# Patient Record
Sex: FEMALE | Race: WHITE | NOT HISPANIC OR LATINO | Employment: OTHER | URBAN - NONMETROPOLITAN AREA
[De-identification: names, ages, dates, MRNs, and addresses within clinical notes are randomized per-mention and may not be internally consistent; named-entity substitution may affect disease eponyms.]

---

## 2021-04-27 DIAGNOSIS — I10 ESSENTIAL HYPERTENSION, BENIGN: Primary | ICD-10-CM

## 2021-04-27 DIAGNOSIS — E78.2 MIXED HYPERLIPIDEMIA: ICD-10-CM

## 2021-04-27 RX ORDER — ROSUVASTATIN CALCIUM 20 MG/1
TABLET, COATED ORAL
Qty: 90 TABLET | Refills: 0 | Status: SHIPPED | OUTPATIENT
Start: 2021-04-27 | End: 2021-09-10 | Stop reason: SDUPTHER

## 2021-04-27 RX ORDER — METOPROLOL SUCCINATE 50 MG/1
TABLET, EXTENDED RELEASE ORAL
Qty: 90 TABLET | Refills: 0 | Status: SHIPPED | OUTPATIENT
Start: 2021-04-27 | End: 2021-09-10 | Stop reason: SDUPTHER

## 2021-09-10 ENCOUNTER — OFFICE VISIT (OUTPATIENT)
Dept: CARDIOLOGY | Facility: CLINIC | Age: 61
End: 2021-09-10

## 2021-09-10 VITALS
TEMPERATURE: 99 F | HEIGHT: 57 IN | DIASTOLIC BLOOD PRESSURE: 82 MMHG | OXYGEN SATURATION: 96 % | RESPIRATION RATE: 12 BRPM | HEART RATE: 86 BPM | WEIGHT: 135 LBS | BODY MASS INDEX: 29.12 KG/M2 | SYSTOLIC BLOOD PRESSURE: 124 MMHG

## 2021-09-10 DIAGNOSIS — I10 ESSENTIAL HYPERTENSION: Primary | ICD-10-CM

## 2021-09-10 DIAGNOSIS — E78.5 HYPERLIPIDEMIA LDL GOAL <100: ICD-10-CM

## 2021-09-10 DIAGNOSIS — J44.1 COPD WITH ACUTE EXACERBATION (HCC): ICD-10-CM

## 2021-09-10 DIAGNOSIS — Z72.0 TOBACCO USE: ICD-10-CM

## 2021-09-10 DIAGNOSIS — E78.2 MIXED HYPERLIPIDEMIA: ICD-10-CM

## 2021-09-10 DIAGNOSIS — I10 ESSENTIAL HYPERTENSION, BENIGN: ICD-10-CM

## 2021-09-10 PROCEDURE — 99203 OFFICE O/P NEW LOW 30 MIN: CPT | Performed by: INTERNAL MEDICINE

## 2021-09-10 PROCEDURE — 93000 ELECTROCARDIOGRAM COMPLETE: CPT | Performed by: INTERNAL MEDICINE

## 2021-09-10 RX ORDER — LISINOPRIL 20 MG/1
20 TABLET ORAL DAILY
Qty: 90 TABLET | Refills: 3 | Status: SHIPPED | OUTPATIENT
Start: 2021-09-10 | End: 2022-09-09 | Stop reason: SDUPTHER

## 2021-09-10 RX ORDER — LANOLIN ALCOHOL/MO/W.PET/CERES
1000 CREAM (GRAM) TOPICAL DAILY
COMMUNITY
End: 2023-03-09

## 2021-09-10 RX ORDER — ROSUVASTATIN CALCIUM 20 MG/1
20 TABLET, COATED ORAL DAILY
Qty: 90 TABLET | Refills: 3 | Status: SHIPPED | OUTPATIENT
Start: 2021-09-10 | End: 2022-08-12

## 2021-09-10 RX ORDER — METOPROLOL SUCCINATE 50 MG/1
50 TABLET, EXTENDED RELEASE ORAL DAILY
Qty: 90 TABLET | Refills: 3 | Status: SHIPPED | OUTPATIENT
Start: 2021-09-10 | End: 2022-08-12

## 2021-09-11 NOTE — PROGRESS NOTES
[facility] [place]  [facility] CARDIOLOGY  [address] [place] [address]  Dept: [phone]  Dept Fax: [phone]    Skye Barlow  1960    Follow Up Office Visit Note    History of Present Illness:  Skye Barlow is a 61 y.o. female who presents to the clinic for Follow-up. Hypertension- She denies any complaints, on Toprol xl 50 mg, and  Lisinopril 20 mg BP is 120.60.     The following portions of the patient's history were reviewed and updated as appropriate: allergies, current medications, past family history, past medical history, past social history, past surgical history and problem list.    Medications:  albuterol  albuterol sulfate HFA  diclofenac  levocetirizine  lisinopril  metoprolol succinate XL  pantoprazole  rosuvastatin  Spiriva Respimat aerosol solution  vitamin B-12  vitamin D3 capsule  Zinc capsule    Subjective  Allergies   Allergen Reactions   • Sulfa Antibiotics Hives   • Sulfamethoxazole Rash   • Trimethoprim Rash        Past Medical History:   Diagnosis Date   • Anxiety and depression    • Anxiety state    • Benign essential hypertension    • Chronic obstructive lung disease (CMS/[facility])    • COPD (chronic obstructive pulmonary disease) (CMS/[facility])    • Depressive disorder    • Gastroesophageal reflux disease    • Migraines    • Psoriasis    • TMJ (dislocation of temporomandibular joint)    • Tobacco abuse        Past Surgical History:   Procedure Laterality Date   •  SECTION      X3       Family History   Problem Relation Age of Onset   • Hypertension Mother    • Hypertension Father    • Dementia Father    • Colon cancer Maternal Grandmother    • Coronary artery disease Paternal Grandmother    • Diabetes Paternal Grandmother    • Hypertension Other    • Diabetes Other         Social History     Socioeconomic History   • Marital status:      Spouse name: Not on file   • Number of children: Not on file   • Years of education: Not on file   •  "Highest education level: Not on file   Tobacco Use   • Smoking status: Current Every Day Smoker     Packs/day: 0.25     Types: Cigarettes   • Smokeless tobacco: Never Used   Vaping Use   • Vaping Use: Never used   Substance and Sexual Activity   • Alcohol use: Never   • Drug use: Never   • Sexual activity: Defer       Review of Systems   Constitutional: Negative.    HENT: Negative.    Respiratory: Negative.    Cardiovascular: Negative.    Endocrine: Negative.    Genitourinary: Negative.    Musculoskeletal: Negative.    Skin: Negative.    Allergic/Immunologic: Negative.    Neurological: Negative.    Hematological: Negative.    Psychiatric/Behavioral: Negative.    All other systems reviewed and are negative.      Cardiovascular Procedures    ECHO/MUGA:   STRESS TESTS:   CARDIAC CATH:   DEVICES:   HOLTER:   CT/MRI:   VASCULAR:   CARDIOTHORACIC:     Objective  Vitals:    09/10/21 1508   BP: 124/82   BP Location: Right arm   Patient Position: Sitting   Cuff Size: Adult   Pulse: 86   Resp: 12   Temp: 99 °F (37.2 °C)   TempSrc: Infrared   SpO2: 96%   Weight: 61.2 kg (135 lb)   Height: 144.8 cm (57\")   PainSc: 0-No pain     Body mass index is 29.21 kg/m².     Physical Exam  Constitutional:       Appearance: Healthy appearance. Not in distress.   Neck:      Vascular: No JVR. JVD normal.   Pulmonary:      Effort: Pulmonary effort is normal.      Breath sounds: Normal breath sounds. No wheezing. No rhonchi. No rales.   Chest:      Chest wall: Not tender to palpatation.   Cardiovascular:      PMI at left midclavicular line. Normal rate. Regular rhythm. Normal S1. Normal S2.      Murmurs: There is no murmur.      No gallop. No click. No rub.   Pulses:     Intact distal pulses.   Edema:     Peripheral edema absent.   Abdominal:      General: Bowel sounds are normal.      Palpations: Abdomen is soft.      Tenderness: There is no abdominal tenderness.   Musculoskeletal: Normal range of motion.         General: No tenderness. " Skin:     General: Skin is warm and dry.   Neurological:      General: No focal deficit present.      Mental Status: Alert and oriented to person, place and time.          Diagnostic Data    ECG 12 Lead    Date/Time: 9/10/2021 9:19 PM  Performed by: Adnre Burt MD  Authorized by: Andre Burt MD   Comparison: compared with previous ECG   Similar to previous ECG  Rhythm: sinus rhythm  Rate: normal  BPM: 76  QRS axis: normal    Clinical impression: normal ECG            Assessment and Plan  Diagnoses and all orders for this visit:    Essential hypertension- The BP seems fine, will keep same meds    Hyperlipidemia LDL goal <100- On Crestor 20 mg    Tobacco use- Still smoking 6 cigarettes daily    COPD with acute exacerbation (CMS/HCC)  -        Other orders  -     Zinc 50 MG capsule; Take  by mouth.  -     vitamin B-12 (CYANOCOBALAMIN) 1000 MCG tablet; Take 1,000 mcg by mouth Daily.  -     lisinopril (PRINIVIL,ZESTRIL) 20 MG tablet; Take 1 tablet by mouth Daily.         Return in about 1 year (around 9/10/2022) for Recheck.    Andre Burt MD  09/10/2021

## 2022-04-08 ENCOUNTER — TELEPHONE (OUTPATIENT)
Dept: FAMILY MEDICINE CLINIC | Facility: CLINIC | Age: 62
End: 2022-04-08

## 2022-04-08 RX ORDER — ALBUTEROL SULFATE 90 UG/1
2 AEROSOL, METERED RESPIRATORY (INHALATION) EVERY 4 HOURS PRN
Qty: 8.5 G | Refills: 1 | Status: SHIPPED | OUTPATIENT
Start: 2022-04-08

## 2022-04-08 RX ORDER — TIOTROPIUM BROMIDE INHALATION SPRAY 3.12 UG/1
2 SPRAY, METERED RESPIRATORY (INHALATION)
Qty: 4 G | Refills: 1 | Status: SHIPPED | OUTPATIENT
Start: 2022-04-08 | End: 2022-08-12

## 2022-04-08 NOTE — TELEPHONE ENCOUNTER
Incoming Refill Request      Medication requested (name and dose): INHALER    Pharm[facility] where [facility] be sent[facility]    Additional details provided by patient: PT IS OUT    Best call back number: [phone]    Does the patient have less than a 3 day supply:  [x] Yes  [] No    Meli Leos Rep  04/08/22, 09:55 EDT

## 2022-04-26 ENCOUNTER — OFFICE VISIT (OUTPATIENT)
Dept: FAMILY MEDICINE CLINIC | Facility: CLINIC | Age: 62
End: 2022-04-26

## 2022-04-26 VITALS
OXYGEN SATURATION: 95 % | WEIGHT: 142 LBS | DIASTOLIC BLOOD PRESSURE: 84 MMHG | HEART RATE: 80 BPM | HEIGHT: 57 IN | TEMPERATURE: 97.8 F | BODY MASS INDEX: 30.63 KG/M2 | SYSTOLIC BLOOD PRESSURE: 158 MMHG

## 2022-04-26 DIAGNOSIS — J40 BRONCHITIS: Primary | ICD-10-CM

## 2022-04-26 PROCEDURE — 99213 OFFICE O/P EST LOW 20 MIN: CPT | Performed by: PHYSICIAN ASSISTANT

## 2022-04-26 RX ORDER — PREDNISONE 20 MG/1
TABLET ORAL
Qty: 10 TABLET | Refills: 0 | Status: SHIPPED | OUTPATIENT
Start: 2022-04-26 | End: 2022-05-25

## 2022-04-26 RX ORDER — DOXYCYCLINE HYCLATE 100 MG/1
100 CAPSULE ORAL 2 TIMES DAILY
Qty: 20 CAPSULE | Refills: 0 | Status: SHIPPED | OUTPATIENT
Start: 2022-04-26 | End: 2022-05-25

## 2022-04-27 NOTE — PROGRESS NOTES
"Chief Complaint  Shortness of Breath (Pt complains of SOB, cough and congeestion onset 1 week. )    Subjective          Skye Barlow presents to [facility]  Patient in today for evaluation on cough, congestion, and some shortness of breath and wheezing  for the past 7 to 10 days.  Denies any fever.  States has had some production of yellow thick phlegm.  History of COPD.  Denies any known sick contacts.  Denies any body aches.  States feels similar to her bronchitis symptoms in the past.  Denies any nausea or vomiting.    Shortness of Breath  This is a new problem. Associated symptoms include wheezing. Pertinent negatives include no abdominal pain, ear pain, fever, headaches, leg swelling, PND, sore throat or vomiting.       Objective   Vital Signs:   /84   Pulse 80   Temp 97.8 °F (36.6 °C) (Oral)   Ht 144.8 cm (57\")   Wt 64.4 kg (142 lb)   SpO2 95%   BMI 30.73 kg/m²     Body mass index is 30.73 kg/m².    Review of Systems   Constitutional: Negative for fever.   HENT: Positive for congestion. Negative for ear pain and sore throat.    Respiratory: Positive for cough, shortness of breath and wheezing.    Cardiovascular: Negative for palpitations, leg swelling and PND.   Gastrointestinal: Negative for abdominal pain, diarrhea, nausea and vomiting.   Neurological: Negative for dizziness and headache.       Past History:  Medical History: has a past medical history of Anxiety and depression, Anxiety state, Benign essential hypertension, Chronic obstructive lung disease (HCC), COPD (chronic obstructive pulmonary disease) (HCC), Depressive disorder, Gastroesophageal reflux disease, Migraines, Psoriasis, TMJ (dislocation of temporomandibular joint), and Tobacco abuse.   Surgical History: has a past surgical history that includes  section.   Family History: family history includes Colon cancer in her maternal grandmother; Coronary artery disease in her paternal grandmother; " Dementia in her father; Diabetes in her paternal grandmother and another family member; Hypertension in her father, mother, and another family member.   Social History: reports that she has been smoking cigarettes. She started smoking about 41 years ago. She has a 10.25 pack-year smoking history. She has never used smokeless tobacco. She reports that she does not drink alcohol and does not use drugs.      Current Outpatient Medications:   •  albuterol (PROVENTIL) (2.5 MG/3ML) 0.083% nebulizer solution, Take 2.5 mg by nebulization Every 4 (Four) Hours As Needed for Wheezing., Disp: , Rfl:   •  albuterol sulfate  (90 Base) MCG/ACT inhaler, Inhale 2 puffs Every 4 (Four) Hours As Needed for Wheezing., Disp: 8.5 g, Rfl: 1  •  lisinopril (PRINIVIL,ZESTRIL) 20 MG tablet, Take 1 tablet by mouth Daily., Disp: 90 tablet, Rfl: 3  •  metoprolol succinate XL (TOPROL-XL) 50 MG 24 hr tablet, Take 1 tablet by mouth Daily., Disp: 90 tablet, Rfl: 3  •  pantoprazole (PROTONIX) 40 MG EC tablet, Take 40 mg by mouth Daily., Disp: , Rfl:   •  rosuvastatin (CRESTOR) 20 MG tablet, Take 1 tablet by mouth Daily., Disp: 90 tablet, Rfl: 3  •  tiotropium bromide monohydrate (Spiriva Respimat) 2.5 MCG/ACT aerosol solution inhaler, Inhale 2 puffs Daily., Disp: 4 g, Rfl: 1  •  vitamin B-12 (CYANOCOBALAMIN) 1000 MCG tablet, Take 1,000 mcg by mouth Daily., Disp: , Rfl:   •  vitamin D3 125 MCG (5000 UT) capsule capsule, Take 5,000 Units by mouth. TAKE 1 CAPSULE  3 TIMES A WEEK, Disp: , Rfl:   •  Zinc 50 MG capsule, Take  by mouth., Disp: , Rfl:   •  doxycycline (VIBRAMYCIN) 100 MG capsule, Take 1 capsule by mouth 2 (Two) Times a Day., Disp: 20 capsule, Rfl: 0  •  levocetirizine (XYZAL) 5 MG tablet, Take 5 mg by mouth Every Evening., Disp: , Rfl:   •  predniSONE (DELTASONE) 20 MG tablet, Take 2 pills by oral route  once a day x 5 days, Disp: 10 tablet, Rfl: 0  Allergies: Sulfa antibiotics, Sulfamethoxazole, and Trimethoprim    Physical  Exam  Constitutional:       Appearance: Normal appearance.   HENT:      Right Ear: Tympanic membrane normal.      Left Ear: Tympanic membrane normal.      Mouth/Throat:      Pharynx: Oropharynx is clear.   Eyes:      Conjunctiva/sclera: Conjunctivae normal.      Pupils: Pupils are equal, round, and reactive to light.   Cardiovascular:      Rate and Rhythm: Normal rate and regular rhythm.      Heart sounds: Normal heart sounds.   Pulmonary:      Effort: Pulmonary effort is normal.      Breath sounds: Wheezing present.      Comments: Faint wheeze  Abdominal:      Palpations: Abdomen is soft.      Tenderness: There is no abdominal tenderness.   Neurological:      Mental Status: She is oriented to person, place, and time.   Psychiatric:         Mood and Affect: Mood normal.         Behavior: Behavior normal.             Assessment and Plan   Diagnoses and all orders for this visit:    1. Bronchitis (Primary)  Patient declines x-ray and COVID testing today.  Will start on doxycycline and recommend expectorant as needed.  We will also give prednisone x5 days- avoid nsaids while taking.   Encouraged patient to monitor her symptoms closely and if not noticing improvement or any progression of symptoms to the emergency room if needed.      Other orders  -     doxycycline (VIBRAMYCIN) 100 MG capsule; Take 1 capsule by mouth 2 (Two) Times a Day.  Dispense: 20 capsule; Refill: 0  -     predniSONE (DELTASONE) 20 MG tablet; Take 2 pills by oral route  once a day x 5 days  Dispense: 10 tablet; Refill: 0            Follow Up   Return if symptoms worsen or fail to improve.  Patient was given instructions and counseling regarding her condition or for health maintenance advice. Please see specific information pulled into the AVS if appropriate.     Nata Rivera PA-C

## 2022-05-16 ENCOUNTER — TELEPHONE (OUTPATIENT)
Dept: FAMILY MEDICINE CLINIC | Facility: CLINIC | Age: 62
End: 2022-05-16

## 2022-05-16 NOTE — TELEPHONE ENCOUNTER
Pt contacted she needs to go to the ER per marylin her o2 in 89 sitting.   Told her she could go to the [facility] but she cant get in.   Asked if she needed us to call ambulance she said she had a ride.

## 2022-05-16 NOTE — TELEPHONE ENCOUNTER
Pt spouse, Leandro, stopped in stating her COPD is acting up and is requesting a steroid and an antibiotic sent to [facility]. Pt was just seen on 4/26/22 for Bronchitis.

## 2022-05-20 ENCOUNTER — TRANSITIONAL CARE MANAGEMENT TELEPHONE ENCOUNTER (OUTPATIENT)
Dept: CALL CENTER | Facility: HOSPITAL | Age: 62
End: 2022-05-20

## 2022-05-20 ENCOUNTER — READMISSION MANAGEMENT (OUTPATIENT)
Dept: CALL CENTER | Facility: HOSPITAL | Age: 62
End: 2022-05-20

## 2022-05-20 NOTE — OUTREACH NOTE
Call Center TCM Note    Flowsheet Row Responses   [facility] patient discharged from? Non-BH   Does the patient have one of the following disease processes/diagnoses(primary or secondary)? Other   TCM attempt successful? No   Unsuccessful attempts Attempt 2          Mi Sommer LPN    5/20/2022, 16:16 EDT

## 2022-05-20 NOTE — OUTREACH NOTE
Call Center TCM Note    Flowsheet Row Responses   [facility] patient discharged from? Non-BH   Does the patient have one of the following disease processes/diagnoses(primary or secondary)? Other   TCM attempt successful? No   Unsuccessful attempts Attempt 1          Mi Sommer LPN    5/20/2022, 13:50 EDT

## 2022-05-20 NOTE — OUTREACH NOTE
Prep Survey    Flowsheet Row Responses   [facility] facility patient discharged from? Non-BH   Is LACE score < 7 ? Non-BH Discharge   Emergency Room discharge w/ pulse ox? No   Eligibility [facility]   Date of Admission 05/16/22   Date of Discharge 05/19/22   Discharge diagnosis COPD exacerbation   Does the patient have one of the following disease processes/diagnoses(primary or secondary)? Other   Prep survey completed? Yes          SCOT HUGHES - Registered Nurse

## 2022-05-22 ENCOUNTER — TRANSITIONAL CARE MANAGEMENT TELEPHONE ENCOUNTER (OUTPATIENT)
Dept: CALL CENTER | Facility: HOSPITAL | Age: 62
End: 2022-05-22

## 2022-05-22 NOTE — OUTREACH NOTE
Call Center TCM Note    Flowsheet Row Responses   [facility] patient discharged from? Non-   Does the patient have one of the following disease processes/diagnoses(primary or secondary)? Other   TCM attempt successful? Yes   Call start time 1443   Call end time 1447   Discharge diagnosis COPD exacerbation   Meds reviewed with patient/caregiver? Yes   Is the patient having any side effects they believe may be caused by any medication additions or changes? No   Does the patient have all medications ordered at discharge? Yes   Is the patient taking all medications as directed (includes completed medication regime)? Yes   Does the patient have a primary care provider?  Yes   Does the patient have an appointment with their PCP within 7 days of discharge? Yes   Comments regarding PCP 5/25/22   Has the patient kept scheduled appointments due by today? N/A   Has home health visited the patient within 72 hours of discharge? N/A   Psychosocial issues? No   Did the patient receive a copy of their discharge instructions? Yes   Nursing interventions Reviewed instructions with patient   What is the patient's perception of their health status since discharge? Improving   Is the patient/caregiver able to teach back signs and symptoms related to disease process for when to call 911? Yes   If the patient is a current smoker, are they able to teach back resources for cessation? [id number]   TCM call completed? Yes          Grace Sanderson RN    5/22/2022, 14:47 EDT

## 2022-05-25 ENCOUNTER — OFFICE VISIT (OUTPATIENT)
Dept: FAMILY MEDICINE CLINIC | Facility: CLINIC | Age: 62
End: 2022-05-25

## 2022-05-25 VITALS
HEART RATE: 114 BPM | BODY MASS INDEX: 28.56 KG/M2 | WEIGHT: 132 LBS | OXYGEN SATURATION: 96 % | DIASTOLIC BLOOD PRESSURE: 88 MMHG | SYSTOLIC BLOOD PRESSURE: 138 MMHG | TEMPERATURE: 98.2 F

## 2022-05-25 DIAGNOSIS — R79.89 ABNORMAL SERUM THYROID STIMULATING HORMONE (TSH) LEVEL: ICD-10-CM

## 2022-05-25 DIAGNOSIS — J44.1 COPD WITH ACUTE EXACERBATION: Primary | ICD-10-CM

## 2022-05-25 PROCEDURE — 99496 TRANSJ CARE MGMT HIGH F2F 7D: CPT | Performed by: PHYSICIAN ASSISTANT

## 2022-05-25 NOTE — PROGRESS NOTES
Chief Complaint  Hospital Follow Up Visit (Pt is here for a HFU. )    Subjective     {Problem List  Visit Diagnosis   Encounters  Notes  Medications  Labs  Result Review Imaging  Media :23}     Skye Barlow presents to [facility]  History of Present Illness    Objective   Vital Signs:   /88   Pulse 114   Temp 98.2 °F (36.8 °C) (Oral)   Wt 59.9 kg (132 lb)   SpO2 96%   BMI 28.56 kg/m²     Body mass index is 28.56 kg/m².    Review of Systems    Past History:  Medical History: has a past medical history of Anxiety and depression, Anxiety state, Benign essential hypertension, Chronic obstructive lung disease (HCC), COPD (chronic obstructive pulmonary disease) (HCC), Depressive disorder, Gastroesophageal reflux disease, Migraines, Psoriasis, TMJ (dislocation of temporomandibular joint), and Tobacco abuse.   Surgical History: has a past surgical history that includes  section.   Family History: family history includes Colon cancer in her maternal grandmother; Coronary artery disease in her paternal grandmother; Dementia in her father; Diabetes in her paternal grandmother and another family member; Hypertension in her father, mother, and another family member.   Social History: reports that she has been smoking cigarettes. She started smoking about 41 years ago. She has a 10.25 pack-year smoking history. She has never used smokeless tobacco. She reports that she does not drink alcohol and does not use drugs.      Current Outpatient Medications:   •  albuterol (PROVENTIL) (2.5 MG/3ML) 0.083% nebulizer solution, Take 2.5 mg by nebulization Every 4 (Four) Hours As Needed for Wheezing., Disp: , Rfl:   •  albuterol sulfate  (90 Base) MCG/ACT inhaler, Inhale 2 puffs Every 4 (Four) Hours As Needed for Wheezing., Disp: 8.5 g, Rfl: 1  •  levocetirizine (XYZAL) 5 MG tablet, Take 5 mg by mouth Every Evening., Disp: , Rfl:   •  rosuvastatin (CRESTOR) 20 MG tablet, Take  1 tablet by mouth Daily., Disp: 90 tablet, Rfl: 3  •  tiotropium bromide monohydrate (Spiriva Respimat) 2.5 MCG/ACT aerosol solution inhaler, Inhale 2 puffs Daily., Disp: 4 g, Rfl: 1  •  vitamin B-12 (CYANOCOBALAMIN) 1000 MCG tablet, Take 1,000 mcg by mouth Daily., Disp: , Rfl:   •  vitamin D3 125 MCG (5000 UT) capsule capsule, Take 5,000 Units by mouth. TAKE 1 CAPSULE  3 TIMES A WEEK, Disp: , Rfl:   •  Zinc 50 MG capsule, Take  by mouth., Disp: , Rfl:   •  lisinopril (PRINIVIL,ZESTRIL) 20 MG tablet, Take 1 tablet by mouth Daily., Disp: 90 tablet, Rfl: 3  •  metoprolol succinate XL (TOPROL-XL) 50 MG 24 hr tablet, Take 1 tablet by mouth Daily., Disp: 90 tablet, Rfl: 3  •  pantoprazole (PROTONIX) 40 MG EC tablet, Take 40 mg by mouth Daily., Disp: , Rfl:   Allergies: Sulfa antibiotics, Sulfamethoxazole, and Trimethoprim    Physical Exam        Assessment and Plan   Diagnoses and all orders for this visit:    1. COPD with acute exacerbation (HCC) (Primary)    2. Abnormal serum thyroid stimulating hormone (TSH) level        {Time Spent (Optional):06005}    Follow Up {Instructions Charge Capture  Follow-up Communications :23}  No follow-ups on file.  Patient was given instructions and counseling regarding her condition or for health maintenance advice. Please see specific information pulled into the AVS if appropriate.     Nata Rivera PA-C

## 2022-05-25 NOTE — PROGRESS NOTES
Transitional Care Follow Up Visit  Subjective     Skye Barlow is a 62 y.o. female who presents for a transitional care management visit.    Within 48 business hours after discharge our office contacted her via telephone to coordinate her care and needs.      I reviewed and discussed the details of that call along with the discharge summary, hospital problems, inpatient lab results, inpatient diagnostic studies, and consultation reports with Skye.     Current outpatient and discharge medications have been reconciled for the patient.  Reviewed by: Nata Rivera PA-C      Date of TCM Phone Call 5/20/2022   [facility]   Date of Admission 5/16/2022   Date of Discharge 5/19/2022     Risk for Readmission (LACE) No data recorded    History of Present Illness   Patient in today for hospital follow-up.  She was admitted for acute exacerbation of her COPD.  Was sent home on antibiotic as was also covered for possible pneumonia.  She is completed her antibiotic and is feeling much better.  She actually was sent home on oxygen but she herself has weaned down and currently only using prn but states has not used past several days. States her pulse ox staying between 95- 100% on room air.  In hospital her TSH did come back low at 0.206- with nml free T4.      Vitals:    05/25/22 1053   BP: 138/88   Pulse: 114   Temp: 98.2 °F (36.8 °C)   SpO2: 96%        Review of Systems   Constitutional: Negative for fever.   HENT: Negative for congestion and sore throat.    Respiratory: Negative for shortness of breath.         She states is back to her baseline nml for breathing.    Cardiovascular: Negative for chest pain, palpitations and leg swelling.   Genitourinary: Negative for dysuria and frequency.   Neurological: Negative for dizziness and headaches.       Objective   Physical Exam  Constitutional:       Appearance: Normal appearance.   HENT:      Right Ear: Tympanic membrane normal.      Left Ear: Tympanic membrane normal.       Mouth/Throat:      Pharynx: Oropharynx is clear.   Eyes:      Conjunctiva/sclera: Conjunctivae normal.      Pupils: Pupils are equal, round, and reactive to light.   Cardiovascular:      Rate and Rhythm: Normal rate and regular rhythm.      Heart sounds: Normal heart sounds.   Pulmonary:      Effort: Pulmonary effort is normal.      Breath sounds: Normal breath sounds.   Abdominal:      Palpations: Abdomen is soft.      Tenderness: There is no abdominal tenderness.   Neurological:      Mental Status: She is oriented to person, place, and time.   Psychiatric:         Mood and Affect: Mood normal.         Behavior: Behavior normal.         Assessment & Plan   Problems Addressed this Visit        Pulmonary and Pneumonias    COPD with acute exacerbation (HCC) - Primary   Patient to keep follow-up with pulmonology as directed. She has pulse ox at home and encouraged to continue to monitor her oxygen levels. RTC or to ER for any return of acute symptoms if needed.    Other Visit Diagnoses     Abnormal serum thyroid stimulating hormone (TSH) level       Recommend to recheck TSH in 1 month.   Diagnoses       Codes Comments    COPD with acute exacerbation (HCC)    -  Primary ICD-10-CM: J44.1  ICD-9-CM: 491.21     Abnormal serum thyroid stimulating hormone (TSH) level     ICD-10-CM: R79.89  ICD-9-CM: 790.6

## 2022-06-07 ENCOUNTER — TELEPHONE (OUTPATIENT)
Dept: FAMILY MEDICINE CLINIC | Facility: CLINIC | Age: 62
End: 2022-06-07

## 2022-06-07 RX ORDER — ALBUTEROL SULFATE 2.5 MG/3ML
2.5 SOLUTION RESPIRATORY (INHALATION) EVERY 4 HOURS PRN
Qty: 360 ML | Refills: 1 | Status: SHIPPED | OUTPATIENT
Start: 2022-06-07 | End: 2022-08-17 | Stop reason: SDUPTHER

## 2022-06-15 ENCOUNTER — LAB (OUTPATIENT)
Dept: FAMILY MEDICINE CLINIC | Facility: CLINIC | Age: 62
End: 2022-06-15

## 2022-06-15 DIAGNOSIS — R79.89 ABNORMAL SERUM THYROID STIMULATING HORMONE (TSH) LEVEL: Primary | ICD-10-CM

## 2022-06-15 PROCEDURE — 36415 COLL VENOUS BLD VENIPUNCTURE: CPT | Performed by: PHYSICIAN ASSISTANT

## 2022-06-16 ENCOUNTER — TELEPHONE (OUTPATIENT)
Dept: FAMILY MEDICINE CLINIC | Facility: CLINIC | Age: 62
End: 2022-06-16

## 2022-06-16 LAB — TSH SERPL DL<=0.005 MIU/L-ACNC: 1.5 UIU/ML (ref 0.45–4.5)

## 2022-06-22 ENCOUNTER — OFFICE VISIT (OUTPATIENT)
Dept: FAMILY MEDICINE CLINIC | Facility: CLINIC | Age: 62
End: 2022-06-22

## 2022-06-22 VITALS
BODY MASS INDEX: 28.35 KG/M2 | OXYGEN SATURATION: 100 % | WEIGHT: 131 LBS | DIASTOLIC BLOOD PRESSURE: 82 MMHG | HEART RATE: 84 BPM | SYSTOLIC BLOOD PRESSURE: 126 MMHG

## 2022-06-22 DIAGNOSIS — J44.9 CHRONIC OBSTRUCTIVE PULMONARY DISEASE, UNSPECIFIED COPD TYPE: Primary | ICD-10-CM

## 2022-06-22 DIAGNOSIS — K21.9 GASTROESOPHAGEAL REFLUX DISEASE, UNSPECIFIED WHETHER ESOPHAGITIS PRESENT: ICD-10-CM

## 2022-06-22 PROCEDURE — 99214 OFFICE O/P EST MOD 30 MIN: CPT | Performed by: PHYSICIAN ASSISTANT

## 2022-06-22 NOTE — PROGRESS NOTES
Chief Complaint  No chief complaint on file.    Subjective          Skye Barlow presents to [facility]  Patient today for follow-up from ER.  She has actually gone on 2 occasions in the last month with COPD exacerbations.  The most recent was 2 days ago.  She has been put on a 2-week taper of prednisone as states as soon as she gets off steroids her symptoms return.  She has been on back-to-back rounds of antibiotics and steroids over the last several months. She has a follow-up with pulmonology in 1 month but was recommended to try and get in sooner by the ER physician.  States they will call today and try to get that appointment moved up.  does have some issues with anxiety that occur when she feels can't catch her breath. Denies depression.   Also  over the last several months has been having more issues with her reflux symptoms and would like to get in with GI for further evaluation.  She does take pantoprazole daily.  Denies any changes to stool.  Denies any dark-colored stools.  No vomiting.  States heartburn is worse with certain foods.    Heartburn  She complains of coughing and heartburn. She reports no abdominal pain, no chest pain, no nausea or no sore throat.       Objective   Vital Signs:   /82   Pulse 84   Wt 59.4 kg (131 lb)   SpO2 100% Comment: 2 liters  BMI 28.35 kg/m²     Body mass index is 28.35 kg/m².    Review of Systems   HENT: Negative for congestion and sore throat.    Respiratory: Positive for cough. Negative for shortness of breath.    Cardiovascular: Negative for chest pain.   Gastrointestinal: Positive for GERD. Negative for abdominal pain, blood in stool, nausea and vomiting.   Neurological: Negative for dizziness and headache.       Past History:  Medical History: has a past medical history of Anxiety and depression, Anxiety state, Benign essential hypertension, Chronic obstructive lung disease (HCC), COPD (chronic obstructive  pulmonary disease) (HCC), Depressive disorder, Gastroesophageal reflux disease, Migraines, Psoriasis, TMJ (dislocation of temporomandibular joint), and Tobacco abuse.   Surgical History: has a past surgical history that includes  section.   Family History: family history includes Colon cancer in her maternal grandmother; Coronary artery disease in her paternal grandmother; Dementia in her father; Diabetes in her paternal grandmother and another family member; Hypertension in her father, mother, and another family member.   Social History: reports that she has been smoking cigarettes. She started smoking about 41 years ago. She has a 10.25 pack-year smoking history. She has never used smokeless tobacco. She reports that she does not drink alcohol and does not use drugs.      Current Outpatient Medications:   •  albuterol (PROVENTIL) (2.5 MG/3ML) 0.083% nebulizer solution, Take 2.5 mg by nebulization Every 4 (Four) Hours As Needed for Wheezing., Disp: 360 mL, Rfl: 1  •  albuterol sulfate  (90 Base) MCG/ACT inhaler, Inhale 2 puffs Every 4 (Four) Hours As Needed for Wheezing., Disp: 8.5 g, Rfl: 1  •  levocetirizine (XYZAL) 5 MG tablet, Take 5 mg by mouth Every Evening., Disp: , Rfl:   •  lisinopril (PRINIVIL,ZESTRIL) 20 MG tablet, Take 1 tablet by mouth Daily., Disp: 90 tablet, Rfl: 3  •  metoprolol succinate XL (TOPROL-XL) 50 MG 24 hr tablet, Take 1 tablet by mouth Daily., Disp: 90 tablet, Rfl: 3  •  pantoprazole (PROTONIX) 40 MG EC tablet, Take 40 mg by mouth Daily., Disp: , Rfl:   •  rosuvastatin (CRESTOR) 20 MG tablet, Take 1 tablet by mouth Daily., Disp: 90 tablet, Rfl: 3  •  tiotropium bromide monohydrate (Spiriva Respimat) 2.5 MCG/ACT aerosol solution inhaler, Inhale 2 puffs Daily., Disp: 4 g, Rfl: 1  •  vitamin B-12 (CYANOCOBALAMIN) 1000 MCG tablet, Take 1,000 mcg by mouth Daily., Disp: , Rfl:   •  vitamin D3 125 MCG (5000 UT) capsule capsule, Take 5,000 Units by mouth. TAKE 1 CAPSULE  3 TIMES A  WEEK, Disp: , Rfl:   •  Zinc 50 MG capsule, Take  by mouth., Disp: , Rfl:   Allergies: Sulfa antibiotics, Sulfamethoxazole, and Trimethoprim    Physical Exam        Assessment and Plan   Diagnoses and all orders for this visit:    1. Chronic obstructive pulmonary disease, unspecified COPD type (HCC) (Primary)  Daughter is going to call today to try and get her pulmonology recheck appointment moved up.  Return to clinic or to ER for any return or progression of symptoms as needed.  2. Gastroesophageal reflux disease, unspecified whether esophagitis present  -     Ambulatory Referral to Gastroenterology  Patient to continue pantoprazole.  She states that if she watches her diet closely her symptoms are better.  We will set up with GI for further evaluation.  If notices any acute or progressing symptoms, return to clinic or ER if needed.          Follow Up   No follow-ups on file.  Patient was given instructions and counseling regarding her condition or for health maintenance advice. Please see specific information pulled into the AVS if appropriate.     Nata Rivera PA-C

## 2022-06-28 ENCOUNTER — TELEPHONE (OUTPATIENT)
Dept: FAMILY MEDICINE CLINIC | Facility: CLINIC | Age: 62
End: 2022-06-28

## 2022-06-28 NOTE — TELEPHONE ENCOUNTER
, DAUGHTER, CALLED WITH PT PRESENT, THEY ARE ASKING IF YOU WERE ABLE TO GET PT ANYTHING FOR ANXIETY THAT WAS DISCUSSED DURING LAST APPT. PLEASE CONTACT PT TO LET HER KNOW IF YOU ARE ABLE TO GET HER ANYTHING FOR ANXIETY [phone] OR PT CAN BE CONTACTED [phone].

## 2022-06-28 NOTE — TELEPHONE ENCOUNTER
See message, patient is asking if you could give her anything for the anxiety discussed during her appointment on 06/22

## 2022-06-29 RX ORDER — HYDROXYZINE HYDROCHLORIDE 10 MG/1
TABLET, FILM COATED ORAL
Qty: 20 TABLET | Refills: 0 | Status: SHIPPED | OUTPATIENT
Start: 2022-06-29

## 2022-06-29 NOTE — TELEPHONE ENCOUNTER
Please let her know I did send in the hydroxyzine to try on prn basis for her anxiety  as she had requested prn basis - this could cause some sedation so will need to monitor symptoms closely but was felt better option to try instead of the xanax-- if finds having to take often may need to be on daily medication as we had discussed- she can let me know. Thanks.

## 2022-07-13 RX ORDER — PANTOPRAZOLE SODIUM 40 MG/1
TABLET, DELAYED RELEASE ORAL
Qty: 90 TABLET | Refills: 0 | Status: SHIPPED | OUTPATIENT
Start: 2022-07-13 | End: 2022-10-11

## 2022-07-14 ENCOUNTER — TRANSCRIBE ORDERS (OUTPATIENT)
Dept: ADMINISTRATIVE | Facility: HOSPITAL | Age: 62
End: 2022-07-14

## 2022-07-14 DIAGNOSIS — Z12.31 SCREENING MAMMOGRAM FOR BREAST CANCER: Primary | ICD-10-CM

## 2022-07-15 ENCOUNTER — TRANSCRIBE ORDERS (OUTPATIENT)
Dept: ADMINISTRATIVE | Facility: HOSPITAL | Age: 62
End: 2022-07-15

## 2022-07-15 DIAGNOSIS — N64.4 BREAST PAIN: Primary | ICD-10-CM

## 2022-07-16 ENCOUNTER — OFFICE VISIT (OUTPATIENT)
Dept: FAMILY MEDICINE CLINIC | Facility: CLINIC | Age: 62
End: 2022-07-16

## 2022-07-16 VITALS
TEMPERATURE: 98.4 F | OXYGEN SATURATION: 97 % | DIASTOLIC BLOOD PRESSURE: 84 MMHG | WEIGHT: 137.9 LBS | SYSTOLIC BLOOD PRESSURE: 132 MMHG | BODY MASS INDEX: 29.84 KG/M2 | HEART RATE: 97 BPM

## 2022-07-16 DIAGNOSIS — J06.9 ACUTE URI: ICD-10-CM

## 2022-07-16 DIAGNOSIS — J02.9 SORE THROAT: Primary | ICD-10-CM

## 2022-07-16 LAB
EXPIRATION DATE: NORMAL
FLUAV AG NPH QL: NEGATIVE
FLUBV AG NPH QL: NEGATIVE
INTERNAL CONTROL: NORMAL
Lab: NORMAL
S PYO AG THROAT QL: NEGATIVE
SARS-COV-2 AG UPPER RESP QL IA.RAPID: NOT DETECTED

## 2022-07-16 PROCEDURE — 87426 SARSCOV CORONAVIRUS AG IA: CPT | Performed by: NURSE PRACTITIONER

## 2022-07-16 PROCEDURE — 99214 OFFICE O/P EST MOD 30 MIN: CPT | Performed by: NURSE PRACTITIONER

## 2022-07-16 PROCEDURE — 87880 STREP A ASSAY W/OPTIC: CPT | Performed by: NURSE PRACTITIONER

## 2022-07-16 PROCEDURE — 87804 INFLUENZA ASSAY W/OPTIC: CPT | Performed by: NURSE PRACTITIONER

## 2022-07-16 RX ORDER — CEFDINIR 300 MG/1
300 CAPSULE ORAL 2 TIMES DAILY
Qty: 20 CAPSULE | Refills: 0 | Status: SHIPPED | OUTPATIENT
Start: 2022-07-16 | End: 2022-08-17

## 2022-07-16 NOTE — PROGRESS NOTES
Chief Complaint  URI    Subjective          Skye Barlow presents to [facility]  History of Present Illness     Patient has a history of COPD and states she is very concerned of this gunk and drainage getting down into her lungs.  States for the past few days she has had nasal congestion and sinus pressure.  No fever no chills no body aches.  No sick contacts that she is aware of.  She states she has had 2 COVID vaccines.  She is aware that this could just be a virus but she is requesting an antibiotic in hopes of preventing it from getting out into her lungs.  She understands the risks.    Objective   Vital Signs:   /84   Pulse 97   Temp 98.4 °F (36.9 °C)   Wt 62.6 kg (137 lb 14.4 oz)   SpO2 97%   BMI 29.84 kg/m²     Body mass index is 29.84 kg/m².    Review of Systems   Constitutional: Negative for chills, fatigue and fever.   HENT: Positive for congestion and sinus pressure. Negative for sneezing, sore throat and swollen glands.    Respiratory: Negative for cough, shortness of breath and wheezing.    Cardiovascular: Negative for chest pain and palpitations.   Gastrointestinal: Negative for diarrhea, nausea and vomiting.   Genitourinary: Negative for dysuria.   Musculoskeletal: Negative for back pain.   Neurological: Negative for dizziness, headache and confusion.       Past History:  Medical History: has a past medical history of Anxiety and depression, Anxiety state, Benign essential hypertension, Chronic obstructive lung disease (HCC), COPD (chronic obstructive pulmonary disease) (HCC), Depressive disorder, Gastroesophageal reflux disease, Migraines, Psoriasis, TMJ (dislocation of temporomandibular joint), and Tobacco abuse.   Surgical History: has a past surgical history that includes  section.   Family History: family history includes Colon cancer in her maternal grandmother; Coronary artery disease in her paternal grandmother; Dementia in her father;  Diabetes in her paternal grandmother and another family member; Hypertension in her father, mother, and another family member.   Social History: reports that she has been smoking cigarettes. She started smoking about 41 years ago. She has a 10.25 pack-year smoking history. She has never used smokeless tobacco. She reports that she does not drink alcohol and does not use drugs.    PHQ-2 Depression Screening  Little interest or pleasure in doing things?     Feeling down, depressed, or hopeless?     PHQ-2 Total Score          PHQ-9 Depression Screening  Little interest or pleasure in doing things?     Feeling down, depressed, or hopeless?     Trouble falling or staying asleep, or sleeping too much?     Feeling tired or having little energy?     Poor appetite or overeating?     Feeling bad about yourself - or that you are a failure or have let yourself or your family down?     Trouble concentrating on things, such as reading the newspaper or watching television?     Moving or speaking so slowly that other people could have noticed? Or the opposite - being so fidgety or restless that you have been moving around a lot more than usual?     Thoughts that you would be better off dead, or of hurting yourself in some way?     PHQ-9 Total Score     If you checked off any problems, how difficult have these problems made it for you to do your work, take care of things at home, or get along with other people?       PHQ-9 Total Score:        Patient screened positive for depression based on a PHQ-9 score of 0 on 4/26/2022. Follow-up recommendations include:          Current Outpatient Medications:   •  albuterol (PROVENTIL) (2.5 MG/3ML) 0.083% nebulizer solution, Take 2.5 mg by nebulization Every 4 (Four) Hours As Needed for Wheezing., Disp: 360 mL, Rfl: 1  •  albuterol sulfate  (90 Base) MCG/ACT inhaler, Inhale 2 puffs Every 4 (Four) Hours As Needed for Wheezing., Disp: 8.5 g, Rfl: 1  •  cefdinir (OMNICEF) 300 MG capsule,  Take 1 capsule by mouth 2 (Two) Times a Day., Disp: 20 capsule, Rfl: 0  •  hydrOXYzine (ATARAX) 10 MG tablet, Take one tablet by oral route every 6 hrs, as needed for anxiety, Disp: 20 tablet, Rfl: 0  •  levocetirizine (XYZAL) 5 MG tablet, Take 5 mg by mouth Every Evening., Disp: , Rfl:   •  lisinopril (PRINIVIL,ZESTRIL) 20 MG tablet, Take 1 tablet by mouth Daily., Disp: 90 tablet, Rfl: 3  •  metoprolol succinate XL (TOPROL-XL) 50 MG 24 hr tablet, Take 1 tablet by mouth Daily., Disp: 90 tablet, Rfl: 3  •  pantoprazole (PROTONIX) 40 MG EC tablet, TAKE 1 TABLET BY MOUTH EVERY DAY, Disp: 90 tablet, Rfl: 0  •  rosuvastatin (CRESTOR) 20 MG tablet, Take 1 tablet by mouth Daily., Disp: 90 tablet, Rfl: 3  •  tiotropium bromide monohydrate (Spiriva Respimat) 2.5 MCG/ACT aerosol solution inhaler, Inhale 2 puffs Daily., Disp: 4 g, Rfl: 1  •  vitamin B-12 (CYANOCOBALAMIN) 1000 MCG tablet, Take 1,000 mcg by mouth Daily., Disp: , Rfl:   •  vitamin D3 125 MCG (5000 UT) capsule capsule, Take 5,000 Units by mouth. TAKE 1 CAPSULE  3 TIMES A WEEK, Disp: , Rfl:   •  Zinc 50 MG capsule, Take  by mouth., Disp: , Rfl:    (Not in a hospital admission)     Allergies: Sulfa antibiotics, Sulfamethoxazole, and Trimethoprim    Physical Exam  Constitutional:       Appearance: Normal appearance.   HENT:      Right Ear: Tympanic membrane, ear canal and external ear normal.      Left Ear: Tympanic membrane, ear canal and external ear normal.      Nose: Congestion present.      Mouth/Throat:      Mouth: Mucous membranes are moist.      Pharynx: Oropharynx is clear.   Eyes:      Conjunctiva/sclera: Conjunctivae normal.      Pupils: Pupils are equal, round, and reactive to light.   Cardiovascular:      Rate and Rhythm: Normal rate and regular rhythm.      Heart sounds: Normal heart sounds.   Pulmonary:      Effort: Pulmonary effort is normal.      Breath sounds: Normal breath sounds.   Neurological:      General: No focal deficit present.      Mental  Status: She is alert and oriented to person, place, and time. Mental status is at baseline.   Psychiatric:         Mood and Affect: Mood normal.         Behavior: Behavior normal.         Thought Content: Thought content normal.         Judgment: Judgment normal.          Result Review :                   Assessment and Plan    Diagnoses and all orders for this visit:    1. Sore throat (Primary)  -     Beta Strep Culture, Throat - , Throat; Future  -     Beta Strep Culture, Throat - Swab, Throat    2. Acute URI  Assessment & Plan:  Flu COVID strep negative.  Throat culture sent.  Call in 2 days for results.  Due to her history and her being concerned about getting down to her lungs I Marilee go ahead and start her on a course of cefdinir.  Tylenol and ibuprofen as needed pain fever.  She has an inhalers to use as home as needed for wheezing.  Fluids and rest encouraged.  Risk of meds discussed and understood.  Go to ED over weekend if worsens.  Return in 2 days if no improvement.  Return to clinic or ED with any issues or concerns.    Orders:  -     cefdinir (OMNICEF) 300 MG capsule; Take 1 capsule by mouth 2 (Two) Times a Day.  Dispense: 20 capsule; Refill: 0  -     POC Influenza A / B; Future  -     POC Rapid Strep A; Future  -     POCT VERA SARS-CoV-2 Antigen MARCUS; Future            BMI is >= 25 and <30. (Overweight) The following options were offered after discussion;: exercise counseling/recommendations and nutrition counseling/recommendations       Follow Up   Return if symptoms worsen or fail to improve.  Patient was given instructions and counseling regarding her condition or for health maintenance advice. Please see specific information pulled into the AVS if appropriate.     RICKEY Costa

## 2022-07-16 NOTE — ASSESSMENT & PLAN NOTE
Flu COVID strep negative.  Throat culture sent.  Call in 2 days for results.  Due to her history and her being concerned about getting down to her lungs I Marilee go ahead and start her on a course of cefdinir.  Tylenol and ibuprofen as needed pain fever.  She has an inhalers to use as home as needed for wheezing.  Fluids and rest encouraged.  Risk of meds discussed and understood.  Go to ED over weekend if worsens.  Return in 2 days if no improvement.  Return to clinic or ED with any issues or concerns.

## 2022-07-18 LAB — S PYO THROAT QL CULT: NEGATIVE

## 2022-08-12 ENCOUNTER — APPOINTMENT (OUTPATIENT)
Dept: MAMMOGRAPHY | Facility: HOSPITAL | Age: 62
End: 2022-08-12

## 2022-08-12 DIAGNOSIS — I10 ESSENTIAL HYPERTENSION, BENIGN: ICD-10-CM

## 2022-08-12 DIAGNOSIS — E78.2 MIXED HYPERLIPIDEMIA: ICD-10-CM

## 2022-08-12 RX ORDER — TIOTROPIUM BROMIDE INHALATION SPRAY 3.12 UG/1
SPRAY, METERED RESPIRATORY (INHALATION)
Qty: 4 G | Refills: 1 | Status: SHIPPED | OUTPATIENT
Start: 2022-08-12 | End: 2022-10-11

## 2022-08-12 RX ORDER — METOPROLOL SUCCINATE 50 MG/1
50 TABLET, EXTENDED RELEASE ORAL DAILY
Qty: 90 TABLET | Refills: 0 | Status: SHIPPED | OUTPATIENT
Start: 2022-08-12 | End: 2022-09-09 | Stop reason: SDUPTHER

## 2022-08-12 RX ORDER — ROSUVASTATIN CALCIUM 20 MG/1
20 TABLET, COATED ORAL DAILY
Qty: 90 TABLET | Refills: 0 | Status: SHIPPED | OUTPATIENT
Start: 2022-08-12 | End: 2022-09-09 | Stop reason: SDUPTHER

## 2022-08-12 NOTE — TELEPHONE ENCOUNTER
This is being requested under dr. Stone. Patient was seen in may and June regarding COPD. Can we fill this for patient?

## 2022-08-17 ENCOUNTER — OFFICE VISIT (OUTPATIENT)
Dept: FAMILY MEDICINE CLINIC | Facility: CLINIC | Age: 62
End: 2022-08-17

## 2022-08-17 VITALS
TEMPERATURE: 98.4 F | OXYGEN SATURATION: 99 % | WEIGHT: 133 LBS | DIASTOLIC BLOOD PRESSURE: 80 MMHG | HEIGHT: 57 IN | HEART RATE: 94 BPM | BODY MASS INDEX: 28.69 KG/M2 | SYSTOLIC BLOOD PRESSURE: 130 MMHG

## 2022-08-17 DIAGNOSIS — J30.1 ALLERGIC RHINITIS DUE TO POLLEN, UNSPECIFIED SEASONALITY: ICD-10-CM

## 2022-08-17 DIAGNOSIS — M54.50 LUMBAR PAIN: ICD-10-CM

## 2022-08-17 DIAGNOSIS — J40 BRONCHITIS: Primary | ICD-10-CM

## 2022-08-17 PROCEDURE — 99214 OFFICE O/P EST MOD 30 MIN: CPT | Performed by: NURSE PRACTITIONER

## 2022-08-17 RX ORDER — AZITHROMYCIN 250 MG/1
TABLET, FILM COATED ORAL
Qty: 6 TABLET | Refills: 0 | Status: SHIPPED | OUTPATIENT
Start: 2022-08-17 | End: 2022-08-22

## 2022-08-17 RX ORDER — MONTELUKAST SODIUM 10 MG/1
10 TABLET ORAL NIGHTLY
Qty: 90 TABLET | Refills: 3 | Status: SHIPPED | OUTPATIENT
Start: 2022-08-17 | End: 2023-02-13

## 2022-08-17 RX ORDER — ALBUTEROL SULFATE 2.5 MG/3ML
2.5 SOLUTION RESPIRATORY (INHALATION) EVERY 4 HOURS PRN
Qty: 360 ML | Refills: 1 | Status: SHIPPED | OUTPATIENT
Start: 2022-08-17 | End: 2022-11-11

## 2022-08-17 RX ORDER — PREDNISONE 20 MG/1
TABLET ORAL
Qty: 18 TABLET | Refills: 0 | Status: SHIPPED | OUTPATIENT
Start: 2022-08-17 | End: 2022-09-09

## 2022-08-17 RX ORDER — NICOTINE 21 MG/24HR
PATCH, TRANSDERMAL 24 HOURS TRANSDERMAL
COMMUNITY
Start: 2022-05-19 | End: 2022-11-17

## 2022-08-17 NOTE — PROGRESS NOTES
"Chief Complaint  Cough (Productive cough. Worried about a COPD flair up. X1 week. )    Subjective          Skye Barlow presents to [facility]  Pt has had cough, congestion, and wheezing x 1 week. She denies fever, chills, or myalgias. She denies known sick contacts. She has also had low back pain for years that has worsened in the past few months.       Objective   Vital Signs:   /80   Pulse 94   Temp 98.4 °F (36.9 °C)   Ht 144.8 cm (57\")   Wt 60.3 kg (133 lb)   SpO2 99% Comment: 2 liters of O2  BMI 28.78 kg/m²     Body mass index is 28.78 kg/m².    Review of Systems   Constitutional: Negative for fatigue and fever.   HENT: Positive for congestion.    Respiratory: Positive for cough, shortness of breath and wheezing.    Cardiovascular: Negative for chest pain, palpitations and leg swelling.   Neurological: Negative for syncope.   Psychiatric/Behavioral: The patient is not nervous/anxious.           Current Outpatient Medications:   •  albuterol (PROVENTIL) (2.5 MG/3ML) 0.083% nebulizer solution, Take 2.5 mg by nebulization Every 4 (Four) Hours As Needed for Wheezing., Disp: 360 mL, Rfl: 1  •  albuterol sulfate  (90 Base) MCG/ACT inhaler, Inhale 2 puffs Every 4 (Four) Hours As Needed for Wheezing., Disp: 8.5 g, Rfl: 1  •  hydrOXYzine (ATARAX) 10 MG tablet, Take one tablet by oral route every 6 hrs, as needed for anxiety, Disp: 20 tablet, Rfl: 0  •  metoprolol succinate XL (TOPROL-XL) 50 MG 24 hr tablet, TAKE 1 TABLET BY MOUTH DAILY, Disp: 90 tablet, Rfl: 0  •  nicotine (NICODERM CQ) 21 MG/24HR patch, APPLY 1 PATCH TOPICALLY TO THE SKIN EVERY 24 HOURS, Disp: , Rfl:   •  pantoprazole (PROTONIX) 40 MG EC tablet, TAKE 1 TABLET BY MOUTH EVERY DAY, Disp: 90 tablet, Rfl: 0  •  rosuvastatin (CRESTOR) 20 MG tablet, TAKE 1 TABLET BY MOUTH DAILY, Disp: 90 tablet, Rfl: 0  •  Spiriva Respimat 2.5 MCG/ACT aerosol solution inhaler, INHALE 2 PUFFS BY MOUTH DAILY, Disp: 4 g, Rfl: " 1  •  vitamin B-12 (CYANOCOBALAMIN) 1000 MCG tablet, Take 1,000 mcg by mouth Daily., Disp: , Rfl:   •  vitamin D3 125 MCG (5000 UT) capsule capsule, Take 5,000 Units by mouth. TAKE 1 CAPSULE  3 TIMES A WEEK, Disp: , Rfl:   •  Zinc 50 MG capsule, Take  by mouth., Disp: , Rfl:   •  azithromycin (Zithromax Z-Colin) 250 MG tablet, Take 2 tablets by mouth Daily for 1 day, THEN 1 tablet Daily for 4 days., Disp: 6 tablet, Rfl: 0  •  lisinopril (PRINIVIL,ZESTRIL) 20 MG tablet, Take 1 tablet by mouth Daily., Disp: 90 tablet, Rfl: 3  •  montelukast (Singulair) 10 MG tablet, Take 1 tablet by mouth Every Night., Disp: 90 tablet, Rfl: 3  •  predniSONE (DELTASONE) 20 MG tablet, 3 QD x 3 days, 2 QD x 3 days, 1 QD x 3 days, Disp: 18 tablet, Rfl: 0      Allergies: Sulfa antibiotics, Sulfamethoxazole, and Trimethoprim    Physical Exam  Constitutional:       Appearance: Normal appearance.   HENT:      Head: Normocephalic.   Eyes:      Conjunctiva/sclera: Conjunctivae normal.      Pupils: Pupils are equal, round, and reactive to light.   Cardiovascular:      Rate and Rhythm: Normal rate and regular rhythm.      Heart sounds: Normal heart sounds.   Pulmonary:      Effort: Pulmonary effort is normal.      Breath sounds: Examination of the right-upper field reveals wheezing. Examination of the left-upper field reveals wheezing. Examination of the right-middle field reveals wheezing. Examination of the left-middle field reveals wheezing. Examination of the right-lower field reveals wheezing. Examination of the left-lower field reveals wheezing. Wheezing present.   Abdominal:      Tenderness: There is no abdominal tenderness.   Musculoskeletal:         General: Normal range of motion.   Skin:     General: Skin is warm and dry.      Capillary Refill: Capillary refill takes less than 2 seconds.   Neurological:      General: No focal deficit present.      Mental Status: She is alert and oriented to person, place, and time.   Psychiatric:          Mood and Affect: Mood normal.         Behavior: Behavior normal.         Thought Content: Thought content normal.         Judgment: Judgment normal.          Result Review :                   Assessment and Plan    Diagnoses and all orders for this visit:    1. Bronchitis (Primary)  Comments:  Finish antibiotics and steroids. Albuterol as needed for wheezing. Return for worsened sx and if not improving in 1 week.   Orders:  -     albuterol (PROVENTIL) (2.5 MG/3ML) 0.083% nebulizer solution; Take 2.5 mg by nebulization Every 4 (Four) Hours As Needed for Wheezing.  Dispense: 360 mL; Refill: 1  -     predniSONE (DELTASONE) 20 MG tablet; 3 QD x 3 days, 2 QD x 3 days, 1 QD x 3 days  Dispense: 18 tablet; Refill: 0  -     azithromycin (Zithromax Z-Colin) 250 MG tablet; Take 2 tablets by mouth Daily for 1 day, THEN 1 tablet Daily for 4 days.  Dispense: 6 tablet; Refill: 0    2. Lumbar pain  Comments:  XRs done. We will order MRI. Apply ice to painful areas and ROM stretching.   Orders:  -     XR Spine Lumbar 2 or 3 View; Future    3. Allergic rhinitis due to pollen, unspecified seasonality  -     montelukast (Singulair) 10 MG tablet; Take 1 tablet by mouth Every Night.  Dispense: 90 tablet; Refill: 3                Follow Up   Return in about 1 week (around 8/24/2022) for if not improving or sooner if symptoms worsen.  Patient was given instructions and counseling regarding her condition or for health maintenance advice. Please see specific information pulled into the AVS if appropriate.     RICKEY Lion

## 2022-08-18 ENCOUNTER — TELEPHONE (OUTPATIENT)
Dept: FAMILY MEDICINE CLINIC | Facility: CLINIC | Age: 62
End: 2022-08-18

## 2022-08-18 DIAGNOSIS — M54.50 LUMBAR PAIN: ICD-10-CM

## 2022-08-18 DIAGNOSIS — M25.559 HIP PAIN: Primary | ICD-10-CM

## 2022-08-18 NOTE — TELEPHONE ENCOUNTER
Pt contacted, wanted to know if the xray showed hips and bottom since those bother her also. If not can you order xray for hips and bottom    Does want to go on and do MRI    Also wants to be set up for PT

## 2022-08-18 NOTE — TELEPHONE ENCOUNTER
We let her know that her x-ray showed degenerative disc disease in her back.  We can order an MRI to get a better look at her spinal cord and disc spaces?  Just let me know and I will schedule.  Thanks!

## 2022-08-19 NOTE — TELEPHONE ENCOUNTER
I put order in for XR hips. I'll order MRI and print order for PT. She can  when she comes for XR. Thanks!

## 2022-09-09 ENCOUNTER — OFFICE VISIT (OUTPATIENT)
Dept: CARDIOLOGY | Facility: CLINIC | Age: 62
End: 2022-09-09

## 2022-09-09 VITALS
WEIGHT: 140 LBS | SYSTOLIC BLOOD PRESSURE: 138 MMHG | TEMPERATURE: 97.8 F | HEART RATE: 98 BPM | DIASTOLIC BLOOD PRESSURE: 82 MMHG | OXYGEN SATURATION: 99 % | BODY MASS INDEX: 30.2 KG/M2 | RESPIRATION RATE: 18 BRPM | HEIGHT: 57 IN

## 2022-09-09 DIAGNOSIS — Z72.0 TOBACCO USE: ICD-10-CM

## 2022-09-09 DIAGNOSIS — E78.5 HYPERLIPIDEMIA LDL GOAL <100: ICD-10-CM

## 2022-09-09 DIAGNOSIS — I10 ESSENTIAL HYPERTENSION: Primary | ICD-10-CM

## 2022-09-09 DIAGNOSIS — I10 ESSENTIAL HYPERTENSION, BENIGN: ICD-10-CM

## 2022-09-09 DIAGNOSIS — E78.2 MIXED HYPERLIPIDEMIA: ICD-10-CM

## 2022-09-09 PROCEDURE — 99214 OFFICE O/P EST MOD 30 MIN: CPT | Performed by: INTERNAL MEDICINE

## 2022-09-09 RX ORDER — LISINOPRIL 20 MG/1
20 TABLET ORAL DAILY
Qty: 90 TABLET | Refills: 3 | Status: SHIPPED | OUTPATIENT
Start: 2022-09-09 | End: 2023-03-09 | Stop reason: SDUPTHER

## 2022-09-09 RX ORDER — ALBUTEROL SULFATE 4 MG/1
8 TABLET ORAL 2 TIMES DAILY
COMMUNITY

## 2022-09-09 RX ORDER — METOPROLOL SUCCINATE 50 MG/1
50 TABLET, EXTENDED RELEASE ORAL DAILY
Qty: 90 TABLET | Refills: 3 | Status: SHIPPED | OUTPATIENT
Start: 2022-09-09 | End: 2023-03-09 | Stop reason: SDUPTHER

## 2022-09-09 RX ORDER — ROSUVASTATIN CALCIUM 20 MG/1
20 TABLET, COATED ORAL DAILY
Qty: 90 TABLET | Refills: 3 | Status: SHIPPED | OUTPATIENT
Start: 2022-09-09 | End: 2023-03-09 | Stop reason: SDUPTHER

## 2022-09-09 NOTE — PROGRESS NOTES
[facility] [place]  [facility] CARDIOLOGY  [address] [place] [address]  Dept: [phone]  Dept Fax: [phone]    Skye Barlow  1960    Follow Up Office Visit Note    History of Present Illness:  Skye Barlow is a 62 y.o. female who presents to the clinic for Hypertension. She has been at the hospital with pneumonia COPD in , EKG and echo normal, she is on oxygen now, still smoking, BP is 140.80 just restart the Lisinopril 2-3 days ago was taking only Toprol xl 50 mg.    The following portions of the patient's history were reviewed and updated as appropriate: allergies, current medications, past family history, past medical history, past social history, past surgical history and problem list.    Medications:  albuterol  albuterol sulfate HFA  hydrOXYzine  lisinopril  metoprolol succinate XL  montelukast  nicotine  pantoprazole  rosuvastatin  Spiriva Respimat aerosol solution  vitamin B-12  vitamin D3 capsule  Zinc capsule    Subjective  Allergies   Allergen Reactions   • Sulfa Antibiotics Hives   • Sulfamethoxazole Rash   • Trimethoprim Rash        Past Medical History:   Diagnosis Date   • Anxiety and depression    • Anxiety state    • Benign essential hypertension    • Chronic obstructive lung disease (HCC)    • COPD (chronic obstructive pulmonary disease) (HCC)    • Depressive disorder    • Gastroesophageal reflux disease    • Migraines    • Psoriasis    • TMJ (dislocation of temporomandibular joint)    • Tobacco abuse        Past Surgical History:   Procedure Laterality Date   •  SECTION      X3       Family History   Problem Relation Age of Onset   • Hypertension Mother    • Hypertension Father    • Dementia Father    • Colon cancer Maternal Grandmother    • Coronary artery disease Paternal Grandmother    • Diabetes Paternal Grandmother    • Hypertension Other    • Diabetes Other         Social History     Socioeconomic History   • Marital status:  "   Tobacco Use   • Smoking status: Current Every Day Smoker     Packs/day: 0.25     Years: 41.00     Pack years: 10.25     Types: Cigarettes     Start date: 4/15/1981   • Smokeless tobacco: Never Used   Vaping Use   • Vaping Use: Never used   Substance and Sexual Activity   • Alcohol use: Never   • Drug use: Never   • Sexual activity: Defer       Review of Systems   Constitutional: Negative.    HENT: Negative.    Respiratory: Negative.    Cardiovascular: Negative.    Endocrine: Negative.    Genitourinary: Negative.    Musculoskeletal: Negative.    Skin: Negative.    Allergic/Immunologic: Negative.    Neurological: Negative.    Hematological: Negative.    Psychiatric/Behavioral: Negative.        Cardiovascular Procedures    ECHO/MUGA:   STRESS TESTS:   CARDIAC CATH:   DEVICES:   HOLTER:   CT/MRI:   VASCULAR:   CARDIOTHORACIC:     Objective  Vitals:    09/09/22 1252   BP: 138/82   Pulse: 98   Resp: 18   Temp: 97.8 °F (36.6 °C)   SpO2: 99%   Weight: 63.5 kg (140 lb)   Height: 144.8 cm (57.01\")     Body mass index is 30.29 kg/m².     Physical Exam  Constitutional:       Appearance: Healthy appearance. Not in distress.   Neck:      Vascular: No JVR. JVD normal.   Pulmonary:      Effort: Pulmonary effort is normal.      Breath sounds: Examination of the right-upper field reveals wheezing. Examination of the left-upper field reveals wheezing. Examination of the right-middle field reveals wheezing. Examination of the left-middle field reveals wheezing. Examination of the right-lower field reveals wheezing. Examination of the left-lower field reveals wheezing. Wheezing present. No rhonchi. No rales.   Chest:      Chest wall: Not tender to palpatation.   Cardiovascular:      PMI at left midclavicular line. Normal rate. Regular rhythm. Normal S1. Normal S2.      Murmurs: There is no murmur.      No gallop. No click. No rub.   Pulses:     Intact distal pulses.   Edema:     Peripheral edema absent.   Abdominal:      " General: Bowel sounds are normal.      Palpations: Abdomen is soft.      Tenderness: There is no abdominal tenderness.   Musculoskeletal: Normal range of motion.         General: No tenderness. Skin:     General: Skin is warm and dry.   Neurological:      General: No focal deficit present.      Mental Status: Alert and oriented to person, place and time.          Diagnostic Data  Procedures    Assessment and Plan  Diagnoses and all orders for this visit:    Essential hypertension- The BP is 140.80 and 150.80 just restart Lisinopril, , she is on Toprol xl 50 mg    Hyperlipidemia LDL goal <100- On Crestor 20 mg,    Tobacco use- Advised to quit    COPD- on oxygen     Other orders  -     albuterol (PROVENTIL) 4 MG tablet; Take 4 mg by mouth 3 (Three) Times a Day.  -     lisinopril (PRINIVIL,ZESTRIL) 20 MG tablet; Take 1 tablet by mouth Daily.         Return in about 6 months (around 3/9/2023) for Recheck.    Andre Burt MD  09/09/2022

## 2022-09-13 ENCOUNTER — HOSPITAL ENCOUNTER (OUTPATIENT)
Dept: MAMMOGRAPHY | Facility: HOSPITAL | Age: 62
Discharge: HOME | End: 2022-09-13
Admitting: NURSE PRACTITIONER

## 2022-09-13 DIAGNOSIS — N64.4 BREAST PAIN: ICD-10-CM

## 2022-09-13 PROCEDURE — 77062 BREAST TOMOSYNTHESIS BI: CPT | Performed by: RADIOLOGY

## 2022-09-13 PROCEDURE — 77066 DX MAMMO INCL CAD BI: CPT | Performed by: RADIOLOGY

## 2022-09-13 PROCEDURE — 77066 DX MAMMO INCL CAD BI: CPT

## 2022-09-13 PROCEDURE — G0279 TOMOSYNTHESIS, MAMMO: HCPCS

## 2022-10-11 RX ORDER — PANTOPRAZOLE SODIUM 40 MG/1
TABLET, DELAYED RELEASE ORAL
Qty: 90 TABLET | Refills: 0 | Status: SHIPPED | OUTPATIENT
Start: 2022-10-11 | End: 2023-01-10

## 2022-10-11 RX ORDER — TIOTROPIUM BROMIDE INHALATION SPRAY 3.12 UG/1
SPRAY, METERED RESPIRATORY (INHALATION)
Qty: 4 G | Refills: 1 | Status: SHIPPED | OUTPATIENT
Start: 2022-10-11 | End: 2022-12-11

## 2022-11-11 DIAGNOSIS — J40 BRONCHITIS: ICD-10-CM

## 2022-11-11 RX ORDER — ALBUTEROL SULFATE 2.5 MG/3ML
SOLUTION RESPIRATORY (INHALATION)
Qty: 360 ML | Refills: 1 | Status: SHIPPED | OUTPATIENT
Start: 2022-11-11 | End: 2023-03-18 | Stop reason: SDUPTHER

## 2022-11-15 ENCOUNTER — TELEPHONE (OUTPATIENT)
Dept: FAMILY MEDICINE CLINIC | Facility: CLINIC | Age: 62
End: 2022-11-15

## 2022-11-15 NOTE — TELEPHONE ENCOUNTER
DAUGHTER WOULD LIKE TO KNOW LAST TIME PATIENT HAD A PNEUMONIA VACCINE.     PLEASE CALL [phone]

## 2022-11-15 NOTE — TELEPHONE ENCOUNTER
Last Pneumonia vaccine was 7/9/2019 (PPV23); prior was 12/5/2017 (PCV13)    I don't see that daughter is on HIPAA. Notified patient, chart says not due until 2025.

## 2022-11-17 ENCOUNTER — OFFICE VISIT (OUTPATIENT)
Dept: FAMILY MEDICINE CLINIC | Facility: CLINIC | Age: 62
End: 2022-11-17

## 2022-11-17 VITALS
SYSTOLIC BLOOD PRESSURE: 110 MMHG | BODY MASS INDEX: 29.56 KG/M2 | HEART RATE: 94 BPM | OXYGEN SATURATION: 94 % | TEMPERATURE: 98.8 F | DIASTOLIC BLOOD PRESSURE: 76 MMHG | HEIGHT: 57 IN | WEIGHT: 137 LBS

## 2022-11-17 DIAGNOSIS — J30.1 SEASONAL ALLERGIC RHINITIS DUE TO POLLEN: ICD-10-CM

## 2022-11-17 DIAGNOSIS — J40 BRONCHITIS: Primary | ICD-10-CM

## 2022-11-17 DIAGNOSIS — R05.1 ACUTE COUGH: ICD-10-CM

## 2022-11-17 DIAGNOSIS — J44.1 COPD WITH ACUTE EXACERBATION: ICD-10-CM

## 2022-11-17 LAB
EXPIRATION DATE: NORMAL
FLUAV AG UPPER RESP QL IA.RAPID: NOT DETECTED
FLUBV AG UPPER RESP QL IA.RAPID: NOT DETECTED
INTERNAL CONTROL: NORMAL
Lab: NORMAL
SARS-COV-2 AG UPPER RESP QL IA.RAPID: NOT DETECTED

## 2022-11-17 PROCEDURE — 99214 OFFICE O/P EST MOD 30 MIN: CPT | Performed by: NURSE PRACTITIONER

## 2022-11-17 PROCEDURE — 87428 SARSCOV & INF VIR A&B AG IA: CPT | Performed by: NURSE PRACTITIONER

## 2022-11-17 RX ORDER — LORATADINE 10 MG/1
10 TABLET ORAL DAILY
Qty: 90 TABLET | Refills: 3 | Status: SHIPPED | OUTPATIENT
Start: 2022-11-17

## 2022-11-17 RX ORDER — BUDESONIDE AND FORMOTEROL FUMARATE DIHYDRATE 160; 4.5 UG/1; UG/1
2 AEROSOL RESPIRATORY (INHALATION) 2 TIMES DAILY
COMMUNITY
Start: 2022-10-24

## 2022-11-17 RX ORDER — PREDNISONE 20 MG/1
TABLET ORAL
Qty: 18 TABLET | Refills: 0 | Status: SHIPPED | OUTPATIENT
Start: 2022-11-17 | End: 2023-02-13

## 2022-11-17 RX ORDER — AZITHROMYCIN 250 MG/1
TABLET, FILM COATED ORAL
Qty: 6 TABLET | Refills: 0 | Status: SHIPPED | OUTPATIENT
Start: 2022-11-17 | End: 2022-11-22

## 2022-11-17 NOTE — PROGRESS NOTES
"Chief Complaint  Cough (Productive cough x 1 day. Worse through night. )    Subjective          Skye Barlow presents to [facility]  History of Present Illness  Pt has had cough, congestion, wheezing, and shortness of breath x 2 days. She denies fever, chills, or body aches.   Cough  Associated symptoms include shortness of breath and wheezing. Pertinent negatives include no chest pain or fever.       Objective   Vital Signs:   /76   Pulse 94   Temp 98.8 °F (37.1 °C)   Ht 144.8 cm (57.01\")   Wt 62.1 kg (137 lb)   SpO2 94%   BMI 29.64 kg/m²     Body mass index is 29.64 kg/m².    Review of Systems   Constitutional: Negative for fatigue and fever.   HENT: Positive for congestion.    Respiratory: Positive for cough, shortness of breath and wheezing.    Cardiovascular: Negative for chest pain, palpitations and leg swelling.   Neurological: Negative for syncope.   Psychiatric/Behavioral: The patient is not nervous/anxious.           Current Outpatient Medications:   •  albuterol (PROVENTIL) (2.5 MG/3ML) 0.083% nebulizer solution, INHALE 1 VIAL VIA NEBULIZER EVERY 4 HOURS AS NEEDED WHEEZING, Disp: 360 mL, Rfl: 1  •  albuterol (PROVENTIL) 4 MG tablet, Take 4 mg by mouth 3 (Three) Times a Day., Disp: , Rfl:   •  albuterol sulfate  (90 Base) MCG/ACT inhaler, Inhale 2 puffs Every 4 (Four) Hours As Needed for Wheezing., Disp: 8.5 g, Rfl: 1  •  hydrOXYzine (ATARAX) 10 MG tablet, Take one tablet by oral route every 6 hrs, as needed for anxiety, Disp: 20 tablet, Rfl: 0  •  lisinopril (PRINIVIL,ZESTRIL) 20 MG tablet, Take 1 tablet by mouth Daily., Disp: 90 tablet, Rfl: 3  •  metoprolol succinate XL (TOPROL-XL) 50 MG 24 hr tablet, Take 1 tablet by mouth Daily., Disp: 90 tablet, Rfl: 3  •  montelukast (Singulair) 10 MG tablet, Take 1 tablet by mouth Every Night., Disp: 90 tablet, Rfl: 3  •  pantoprazole (PROTONIX) 40 MG EC tablet, TAKE 1 TABLET BY MOUTH EVERY DAY, Disp: 90 " tablet, Rfl: 0  •  rosuvastatin (CRESTOR) 20 MG tablet, Take 1 tablet by mouth Daily., Disp: 90 tablet, Rfl: 3  •  Spiriva Respimat 2.5 MCG/ACT aerosol solution inhaler, INHALE 2 PUFFS BY MOUTH DAILY, Disp: 4 g, Rfl: 1  •  vitamin B-12 (CYANOCOBALAMIN) 1000 MCG tablet, Take 1,000 mcg by mouth Daily., Disp: , Rfl:   •  vitamin D3 125 MCG (5000 UT) capsule capsule, Take 5,000 Units by mouth. TAKE 1 CAPSULE  3 TIMES A WEEK, Disp: , Rfl:   •  Zinc 50 MG capsule, Take  by mouth., Disp: , Rfl:   •  azithromycin (Zithromax Z-Colin) 250 MG tablet, Take 2 tablets by mouth Daily for 1 day, THEN 1 tablet Daily for 4 days., Disp: 6 tablet, Rfl: 0  •  loratadine (Claritin) 10 MG tablet, Take 1 tablet by mouth Daily., Disp: 90 tablet, Rfl: 3  •  predniSONE (DELTASONE) 20 MG tablet, 3 QD x 3 days, 2 QD x 3 days, 1 QD x 3 days, Disp: 18 tablet, Rfl: 0  •  Symbicort 160-4.5 MCG/ACT inhaler, Inhale 2 puffs 2 (Two) Times a Day., Disp: , Rfl:       Allergies: Sulfa antibiotics, Sulfamethoxazole, and Trimethoprim    Physical Exam  Constitutional:       Appearance: Normal appearance.   HENT:      Head: Normocephalic.   Eyes:      Conjunctiva/sclera: Conjunctivae normal.      Pupils: Pupils are equal, round, and reactive to light.   Cardiovascular:      Rate and Rhythm: Normal rate and regular rhythm.      Heart sounds: Normal heart sounds.   Pulmonary:      Effort: Pulmonary effort is normal.      Breath sounds: Wheezing present.   Abdominal:      Tenderness: There is no abdominal tenderness.   Musculoskeletal:         General: Normal range of motion.   Skin:     General: Skin is warm and dry.      Capillary Refill: Capillary refill takes less than 2 seconds.   Neurological:      General: No focal deficit present.      Mental Status: She is alert and oriented to person, place, and time.   Psychiatric:         Mood and Affect: Mood normal.         Behavior: Behavior normal.         Thought Content: Thought content normal.         Judgment:  Judgment normal.          Result Review :                   Assessment and Plan    Diagnoses and all orders for this visit:    1. Bronchitis (Primary)  Comments:  Finish antibiotics and steroids. Mucinex, Claritin, and Albuterol as needed. Continue inhalers. RTC or go to the ER for worsened sx.   Orders:  -     predniSONE (DELTASONE) 20 MG tablet; 3 QD x 3 days, 2 QD x 3 days, 1 QD x 3 days  Dispense: 18 tablet; Refill: 0  -     azithromycin (Zithromax Z-Colin) 250 MG tablet; Take 2 tablets by mouth Daily for 1 day, THEN 1 tablet Daily for 4 days.  Dispense: 6 tablet; Refill: 0    2. Acute cough  -     POCT SARS-CoV-2 Antigen MARCUS    3. COPD with acute exacerbation (HCC)    4. Seasonal allergic rhinitis due to pollen  -     loratadine (Claritin) 10 MG tablet; Take 1 tablet by mouth Daily.  Dispense: 90 tablet; Refill: 3                Follow Up   Return in about 1 week (around 11/24/2022) for if not improving or sooner if symptoms worsen.  Patient was given instructions and counseling regarding her condition or for health maintenance advice. Please see specific information pulled into the AVS if appropriate.     RICKEY Lion

## 2022-12-09 ENCOUNTER — TELEPHONE (OUTPATIENT)
Dept: FAMILY MEDICINE CLINIC | Facility: CLINIC | Age: 62
End: 2022-12-09

## 2022-12-09 NOTE — TELEPHONE ENCOUNTER
Caller: [id number] [address] [place] [facility] [address] [facility] [phone] [facility] [phone] [place]    Relationship: Pharmacy      What medications are you currently taking:   Current Outpatient Medications on File Prior to Visit   Medication Sig Dispense Refill   • albuterol (PROVENTIL) (2.5 MG/3ML) 0.083% nebulizer solution INHALE 1 VIAL VIA NEBULIZER EVERY 4 HOURS AS NEEDED WHEEZING 360 mL 1   • albuterol (PROVENTIL) 4 MG tablet Take 4 mg by mouth 3 (Three) Times a Day.     • albuterol sulfate  (90 Base) MCG/ACT inhaler Inhale 2 puffs Every 4 (Four) Hours As Needed for Wheezing. 8.5 g 1   • hydrOXYzine (ATARAX) 10 MG tablet Take one tablet by oral route every 6 hrs, as needed for anxiety 20 tablet 0   • lisinopril (PRINIVIL,ZESTRIL) 20 MG tablet Take 1 tablet by mouth Daily. 90 tablet 3   • loratadine (Claritin) 10 MG tablet Take 1 tablet by mouth Daily. 90 tablet 3   • metoprolol succinate XL (TOPROL-XL) 50 MG 24 hr tablet Take 1 tablet by mouth Daily. 90 tablet 3   • montelukast (Singulair) 10 MG tablet Take 1 tablet by mouth Every Night. 90 tablet 3   • pantoprazole (PROTONIX) 40 MG EC tablet TAKE 1 TABLET BY MOUTH EVERY DAY 90 tablet 0   • predniSONE (DELTASONE) 20 MG tablet 3 QD x 3 days, 2 QD x 3 days, 1 QD x 3 days 18 tablet 0   • rosuvastatin (CRESTOR) 20 MG tablet Take 1 tablet by mouth Daily. 90 tablet 3   • Spiriva Respimat 2.5 MCG/ACT aerosol solution inhaler INHALE 2 PUFFS BY MOUTH DAILY 4 g 1   • Symbicort 160-4.5 MCG/ACT inhaler Inhale 2 puffs 2 (Two) Times a Day.     • vitamin B-12 (CYANOCOBALAMIN) 1000 MCG tablet Take 1,000 mcg by mouth Daily.     • vitamin D3 125 MCG (5000 UT) capsule capsule Take 5,000 Units by mouth. TAKE 1 CAPSULE  3 TIMES A WEEK     • Zinc 50 MG capsule Take  by mouth.       No current facility-administered medications on file prior to visit.          What are your concerns: WILL LIKE TO KNOW IF PT SHOULD CONTINUE  WITH RX SINGULAR.

## 2022-12-10 RX ORDER — TIOTROPIUM BROMIDE INHALATION SPRAY 3.12 UG/1
SPRAY, METERED RESPIRATORY (INHALATION)
Qty: 4 G | Refills: 1 | OUTPATIENT
Start: 2022-12-10

## 2022-12-10 NOTE — TELEPHONE ENCOUNTER
You wanted patient on symbicort, but they also requested a refill on this medication. Can you send in if on both?

## 2022-12-11 RX ORDER — TIOTROPIUM BROMIDE INHALATION SPRAY 3.12 UG/1
SPRAY, METERED RESPIRATORY (INHALATION)
Qty: 4 G | Refills: 3 | Status: SHIPPED | OUTPATIENT
Start: 2022-12-11 | End: 2023-04-05

## 2023-01-10 RX ORDER — PANTOPRAZOLE SODIUM 40 MG/1
TABLET, DELAYED RELEASE ORAL
Qty: 90 TABLET | Refills: 0 | Status: SHIPPED | OUTPATIENT
Start: 2023-01-10 | End: 2023-02-06

## 2023-02-06 ENCOUNTER — OFFICE VISIT (OUTPATIENT)
Dept: FAMILY MEDICINE CLINIC | Facility: CLINIC | Age: 63
End: 2023-02-06
Payer: COMMERCIAL

## 2023-02-06 VITALS
HEIGHT: 57 IN | SYSTOLIC BLOOD PRESSURE: 116 MMHG | WEIGHT: 127 LBS | OXYGEN SATURATION: 96 % | BODY MASS INDEX: 27.4 KG/M2 | DIASTOLIC BLOOD PRESSURE: 72 MMHG | HEART RATE: 95 BPM

## 2023-02-06 DIAGNOSIS — E78.2 MIXED HYPERLIPIDEMIA: ICD-10-CM

## 2023-02-06 DIAGNOSIS — Z79.899 HIGH RISK MEDICATION USE: ICD-10-CM

## 2023-02-06 DIAGNOSIS — D50.8 OTHER IRON DEFICIENCY ANEMIA: ICD-10-CM

## 2023-02-06 DIAGNOSIS — E55.9 VITAMIN D DEFICIENCY: ICD-10-CM

## 2023-02-06 DIAGNOSIS — D53.1 MEGALOBLASTIC ANEMIA: ICD-10-CM

## 2023-02-06 DIAGNOSIS — Z72.0 TOBACCO USE: ICD-10-CM

## 2023-02-06 DIAGNOSIS — Z00.00 PREVENTATIVE HEALTH CARE: Primary | ICD-10-CM

## 2023-02-06 DIAGNOSIS — R53.83 OTHER FATIGUE: ICD-10-CM

## 2023-02-06 DIAGNOSIS — E87.6 HYPOKALEMIA: ICD-10-CM

## 2023-02-06 DIAGNOSIS — J44.1 COPD WITH ACUTE EXACERBATION: ICD-10-CM

## 2023-02-06 DIAGNOSIS — I10 ESSENTIAL HYPERTENSION: ICD-10-CM

## 2023-02-06 DIAGNOSIS — E78.5 HYPERLIPIDEMIA LDL GOAL <100: ICD-10-CM

## 2023-02-06 RX ORDER — PANTOPRAZOLE SODIUM 40 MG/1
40 TABLET, DELAYED RELEASE ORAL 2 TIMES DAILY
Qty: 180 TABLET | Refills: 3 | Status: SHIPPED | OUTPATIENT
Start: 2023-02-06

## 2023-02-06 NOTE — PROGRESS NOTES
Office Note     Name: Skye Barlow    : 1960     MRN: [id number]     Chief Complaint  Hypertension, Hyperlipidemia, and Heartburn     Subjective     History of Present Illness:  Skye Barlow is a 62 y.o. female who presents today for hypertension hyperlipidemia and heartburn.  She says the heartburn is gotten out of control she will wake up at night with it burning burning and had to sit up.  The blood pressures been under good control (no shortness of breath palpitations dyspnea on exertion, lightheadedness)  Always controlled on a statin  Dr. Phipps has helped with of diabetes.  Proventil tablets 3 times daily 4 mg are doing wonders.  Neck step is steroids.  Anticipatory guidance counseling: Safety, diet exercise sleep, substance abuse, sexual practices, injury prevention, disease prevention, dental health were gone over with her    Review of Systems:   Review of Systems    Past Medical History:   Past Medical History:   Diagnosis Date   • Anxiety and depression    • Anxiety state    • Benign essential hypertension    • Chronic obstructive lung disease (HCC)    • COPD (chronic obstructive pulmonary disease) (HCC)    • Depressive disorder    • Gastroesophageal reflux disease    • Migraines    • Psoriasis    • TMJ (dislocation of temporomandibular joint)    • Tobacco abuse        Past Surgical History:   Past Surgical History:   Procedure Laterality Date   •  SECTION      X3       Family History:   Family History   Problem Relation Age of Onset   • Hypertension Mother    • Hypertension Father    • Dementia Father    • Colon cancer Maternal Grandmother    • Coronary artery disease Paternal Grandmother    • Diabetes Paternal Grandmother    • Ovarian cancer Paternal Aunt    • Hypertension Other    • Diabetes Other    • Breast cancer Neg Hx        Social History:   Social History     Socioeconomic History   • Marital status:    Tobacco Use   • Smoking status: Every Day     Packs/day:  0.25     Years: 41.00     Pack years: 10.25     Types: Cigarettes     Start date: 4/15/1981   • Smokeless tobacco: Never   Vaping Use   • Vaping Use: Never used   Substance and Sexual Activity   • Alcohol use: Never   • Drug use: Never   • Sexual activity: Defer       Immunizations:   Immunization History   Administered Date(s) Administered   • COVID-19 (PFIZER) PURPLE CAP 08/31/2021, 10/11/2021   • Flu Vaccine Quad PF >36MO 12/09/2019, 10/25/2021   • Fluzone Quad >6mos (Multi-dose) 11/12/2020   • Influenza, Unspecified 11/08/2022   • Pneumococcal Conjugate 13-Valent (PCV13) 12/05/2017   • Pneumococcal Polysaccharide (PPSV23) 07/09/2019   • Tdap 11/25/2014        Medications:     Current Outpatient Medications:   •  albuterol (PROVENTIL) (2.5 MG/3ML) 0.083% nebulizer solution, INHALE 1 VIAL VIA NEBULIZER EVERY 4 HOURS AS NEEDED WHEEZING, Disp: 360 mL, Rfl: 1  •  albuterol (PROVENTIL) 4 MG tablet, Take 4 mg by mouth 3 (Three) Times a Day., Disp: , Rfl:   •  lisinopril (PRINIVIL,ZESTRIL) 20 MG tablet, Take 1 tablet by mouth Daily., Disp: 90 tablet, Rfl: 3  •  loratadine (Claritin) 10 MG tablet, Take 1 tablet by mouth Daily., Disp: 90 tablet, Rfl: 3  •  metoprolol succinate XL (TOPROL-XL) 50 MG 24 hr tablet, Take 1 tablet by mouth Daily., Disp: 90 tablet, Rfl: 3  •  pantoprazole (PROTONIX) 40 MG EC tablet, Take 1 tablet by mouth 2 (Two) Times a Day., Disp: 180 tablet, Rfl: 3  •  rosuvastatin (CRESTOR) 20 MG tablet, Take 1 tablet by mouth Daily., Disp: 90 tablet, Rfl: 3  •  Spiriva Respimat 2.5 MCG/ACT aerosol solution inhaler, INHALE 2 PUFFS BY MOUTH DAILY, Disp: 4 g, Rfl: 3  •  Symbicort 160-4.5 MCG/ACT inhaler, Inhale 2 puffs 2 (Two) Times a Day., Disp: , Rfl:   •  vitamin B-12 (CYANOCOBALAMIN) 1000 MCG tablet, Take 1,000 mcg by mouth Daily., Disp: , Rfl:   •  vitamin D3 125 MCG (5000 UT) capsule capsule, Take 5,000 Units by mouth. TAKE 1 CAPSULE  3 TIMES A WEEK, Disp: , Rfl:   •  Zinc 50 MG capsule, Take  by mouth.,  "Disp: , Rfl:   •  albuterol sulfate  (90 Base) MCG/ACT inhaler, Inhale 2 puffs Every 4 (Four) Hours As Needed for Wheezing., Disp: 8.5 g, Rfl: 1  •  hydrOXYzine (ATARAX) 10 MG tablet, Take one tablet by oral route every 6 hrs, as needed for anxiety, Disp: 20 tablet, Rfl: 0  •  montelukast (Singulair) 10 MG tablet, Take 1 tablet by mouth Every Night., Disp: 90 tablet, Rfl: 3  •  predniSONE (DELTASONE) 20 MG tablet, 3 QD x 3 days, 2 QD x 3 days, 1 QD x 3 days, Disp: 18 tablet, Rfl: 0    Allergies:   Allergies   Allergen Reactions   • Sulfa Antibiotics Hives   • Sulfamethoxazole Rash   • Trimethoprim Rash       Objective     Vital Signs  /72   Pulse 95   Ht 144.8 cm (57\")   Wt 57.6 kg (127 lb)   SpO2 96%   BMI 27.48 kg/m²   Estimated body mass index is 27.48 kg/m² as calculated from the following:    Height as of this encounter: 144.8 cm (57\").    Weight as of this encounter: 57.6 kg (127 lb).          Physical Exam  Vitals and nursing note reviewed.   Constitutional:       Appearance: Normal appearance. She is normal weight.   HENT:      Head: Normocephalic.      Right Ear: External ear normal.      Left Ear: External ear normal.      Nose: Nose normal.   Neck:      Vascular: No carotid bruit.   Cardiovascular:      Rate and Rhythm: Normal rate and regular rhythm.      Pulses: Normal pulses.      Heart sounds: Normal heart sounds.   Pulmonary:      Effort: Pulmonary effort is normal.      Breath sounds: Wheezing present.   Musculoskeletal:      Cervical back: Normal range of motion and neck supple.   Skin:     General: Skin is warm and dry.   Neurological:      Mental Status: She is alert.   Psychiatric:         Mood and Affect: Mood normal.          Procedures     Assessment and Plan   0. Preventative health care  1. Megaloblastic anemia  We will check on now  - Vitamin B12 & Folate; Future  - Vitamin B12 & Folate    2. High risk medication use  We will check on him  - Comprehensive Metabolic Panel; " Future  - Comprehensive Metabolic Panel    3. Other fatigue  We will check in on now  - TSH; Future  - TSH    4. Mixed hyperlipidemia  Will check on now  - Lipid Panel; Future  - Lipid Panel    5. Hypokalemia  Will check on anemia  - Magnesium; Future  - Magnesium    6. Other iron deficiency anemia  Iron deficiency because has a headache  - CBC & Differential; Future  - Ferritin; Future  - CBC & Differential  - Ferritin    7. Vitamin D deficiency  We will check in on  - Vitamin D,25-Hydroxy; Future  - Vitamin D,25-Hydroxy  8.  COPD Dr. Phipps following up  Follow Up  No follow-ups on file.    [facility]  [address] [address] [phone]  Answers for HPI/ROS submitted by the patient on 2/6/2023  What is the primary reason for your visit?: Physical

## 2023-02-07 LAB
25(OH)D3+25(OH)D2 SERPL-MCNC: 32.2 NG/ML (ref 30–100)
ALBUMIN SERPL-MCNC: 4.5 G/DL (ref 3.8–4.8)
ALBUMIN/GLOB SERPL: 2.4 {RATIO} (ref 1.2–2.2)
ALP SERPL-CCNC: 94 IU/L (ref 44–121)
ALT SERPL-CCNC: 22 IU/L (ref 0–32)
AST SERPL-CCNC: 28 IU/L (ref 0–40)
BASOPHILS # BLD AUTO: 0.1 X10E3/UL (ref 0–0.2)
BASOPHILS NFR BLD AUTO: 1 %
BILIRUB SERPL-MCNC: 0.3 MG/DL (ref 0–1.2)
BUN SERPL-MCNC: 11 MG/DL (ref 8–27)
BUN/CREAT SERPL: 14 (ref 12–28)
CALCIUM SERPL-MCNC: 9.8 MG/DL (ref 8.7–10.3)
CHLORIDE SERPL-SCNC: 102 MMOL/L (ref 96–106)
CHOLEST SERPL-MCNC: 153 MG/DL (ref 100–199)
CO2 SERPL-SCNC: 26 MMOL/L (ref 20–29)
CREAT SERPL-MCNC: 0.81 MG/DL (ref 0.57–1)
EGFRCR SERPLBLD CKD-EPI 2021: 82 ML/MIN/1.73
EOSINOPHIL # BLD AUTO: 0.1 X10E3/UL (ref 0–0.4)
EOSINOPHIL NFR BLD AUTO: 2 %
ERYTHROCYTE [DISTWIDTH] IN BLOOD BY AUTOMATED COUNT: 12.4 % (ref 11.7–15.4)
FERRITIN SERPL-MCNC: 106 NG/ML (ref 15–150)
FOLATE SERPL-MCNC: >20 NG/ML
GLOBULIN SER CALC-MCNC: 1.9 G/DL (ref 1.5–4.5)
GLUCOSE SERPL-MCNC: 111 MG/DL (ref 70–99)
HCT VFR BLD AUTO: 39.9 % (ref 34–46.6)
HDLC SERPL-MCNC: 67 MG/DL
HGB BLD-MCNC: 13.4 G/DL (ref 11.1–15.9)
IMM GRANULOCYTES # BLD AUTO: 0 X10E3/UL (ref 0–0.1)
IMM GRANULOCYTES NFR BLD AUTO: 0 %
LDLC SERPL CALC-MCNC: 62 MG/DL (ref 0–99)
LYMPHOCYTES # BLD AUTO: 1.9 X10E3/UL (ref 0.7–3.1)
LYMPHOCYTES NFR BLD AUTO: 25 %
MAGNESIUM SERPL-MCNC: 2.2 MG/DL (ref 1.6–2.3)
MCH RBC QN AUTO: 31.4 PG (ref 26.6–33)
MCHC RBC AUTO-ENTMCNC: 33.6 G/DL (ref 31.5–35.7)
MCV RBC AUTO: 93 FL (ref 79–97)
MONOCYTES # BLD AUTO: 0.8 X10E3/UL (ref 0.1–0.9)
MONOCYTES NFR BLD AUTO: 11 %
NEUTROPHILS # BLD AUTO: 4.5 X10E3/UL (ref 1.4–7)
NEUTROPHILS NFR BLD AUTO: 61 %
PLATELET # BLD AUTO: 367 X10E3/UL (ref 150–450)
POTASSIUM SERPL-SCNC: 4.6 MMOL/L (ref 3.5–5.2)
PROT SERPL-MCNC: 6.4 G/DL (ref 6–8.5)
RBC # BLD AUTO: 4.27 X10E6/UL (ref 3.77–5.28)
SODIUM SERPL-SCNC: 143 MMOL/L (ref 134–144)
TRIGL SERPL-MCNC: 139 MG/DL (ref 0–149)
TSH SERPL DL<=0.005 MIU/L-ACNC: 1.73 UIU/ML (ref 0.45–4.5)
VIT B12 SERPL-MCNC: 592 PG/ML (ref 232–1245)
VLDLC SERPL CALC-MCNC: 24 MG/DL (ref 5–40)
WBC # BLD AUTO: 7.5 X10E3/UL (ref 3.4–10.8)

## 2023-02-13 ENCOUNTER — OFFICE VISIT (OUTPATIENT)
Dept: FAMILY MEDICINE CLINIC | Facility: CLINIC | Age: 63
End: 2023-02-13
Payer: COMMERCIAL

## 2023-02-13 VITALS
HEART RATE: 101 BPM | OXYGEN SATURATION: 95 % | DIASTOLIC BLOOD PRESSURE: 78 MMHG | TEMPERATURE: 98.3 F | WEIGHT: 139 LBS | BODY MASS INDEX: 29.99 KG/M2 | HEIGHT: 57 IN | SYSTOLIC BLOOD PRESSURE: 120 MMHG

## 2023-02-13 DIAGNOSIS — R05.9 COUGH, UNSPECIFIED TYPE: ICD-10-CM

## 2023-02-13 DIAGNOSIS — J40 BRONCHITIS: Primary | ICD-10-CM

## 2023-02-13 PROCEDURE — 99213 OFFICE O/P EST LOW 20 MIN: CPT | Performed by: PHYSICIAN ASSISTANT

## 2023-02-13 PROCEDURE — 87428 SARSCOV & INF VIR A&B AG IA: CPT | Performed by: PHYSICIAN ASSISTANT

## 2023-02-13 RX ORDER — AZITHROMYCIN 250 MG/1
TABLET, FILM COATED ORAL
Qty: 6 TABLET | Refills: 0 | Status: SHIPPED | OUTPATIENT
Start: 2023-02-13 | End: 2023-02-21

## 2023-02-13 NOTE — PROGRESS NOTES
"Chief Complaint  cough (Congestion, cough going on since Thursday )    Subjective          Skye Barlow presents to [facility]  History of Present Illness  Patient in today for nasal congestion and cough for past 4 days.  No known sick exposure. Denies any fever. She had minimal wheeze this am and used her nebulizer. Has COPD. She saw her pulmonologist last week and was given prescription for steroid to keep on hand if needed but has not felt needed it yet . No vomiting or diarrhea.       Objective   Vital Signs:   /78 (BP Location: Left arm, Patient Position: Sitting, Cuff Size: Adult)   Pulse 101   Temp 98.3 °F (36.8 °C)   Ht 144.8 cm (57\")   Wt 63 kg (139 lb)   SpO2 95%   BMI 30.08 kg/m²     Body mass index is 30.08 kg/m².    Review of Systems   Constitutional: Negative for fever.   HENT: Positive for congestion. Negative for sore throat.    Respiratory: Positive for cough and wheezing. Negative for shortness of breath.    Cardiovascular: Negative for chest pain and leg swelling.   Gastrointestinal: Negative for abdominal pain, diarrhea, nausea and vomiting.   Neurological: Negative for dizziness and headache.       Past History:  Medical History: has a past medical history of Anxiety and depression, Anxiety state, Benign essential hypertension, Chronic obstructive lung disease (HCC), COPD (chronic obstructive pulmonary disease) (HCC), Depressive disorder, Gastroesophageal reflux disease, Migraines, Psoriasis, TMJ (dislocation of temporomandibular joint), and Tobacco abuse.   Surgical History: has a past surgical history that includes  section.   Family History: family history includes Colon cancer in her maternal grandmother; Coronary artery disease in her paternal grandmother; Dementia in her father; Diabetes in her paternal grandmother and another family member; Hypertension in her father, mother, and another family member; Ovarian cancer in her paternal " aunt.   Social History: reports that she has been smoking cigarettes. She started smoking about 41 years ago. She has a 10.25 pack-year smoking history. She has never used smokeless tobacco. She reports that she does not drink alcohol and does not use drugs.      Current Outpatient Medications:   •  albuterol (PROVENTIL) (2.5 MG/3ML) 0.083% nebulizer solution, INHALE 1 VIAL VIA NEBULIZER EVERY 4 HOURS AS NEEDED WHEEZING, Disp: 360 mL, Rfl: 1  •  albuterol (PROVENTIL) 4 MG tablet, Take 4 mg by mouth 3 (Three) Times a Day., Disp: , Rfl:   •  albuterol sulfate  (90 Base) MCG/ACT inhaler, Inhale 2 puffs Every 4 (Four) Hours As Needed for Wheezing., Disp: 8.5 g, Rfl: 1  •  hydrOXYzine (ATARAX) 10 MG tablet, Take one tablet by oral route every 6 hrs, as needed for anxiety, Disp: 20 tablet, Rfl: 0  •  lisinopril (PRINIVIL,ZESTRIL) 20 MG tablet, Take 1 tablet by mouth Daily., Disp: 90 tablet, Rfl: 3  •  loratadine (Claritin) 10 MG tablet, Take 1 tablet by mouth Daily., Disp: 90 tablet, Rfl: 3  •  metoprolol succinate XL (TOPROL-XL) 50 MG 24 hr tablet, Take 1 tablet by mouth Daily., Disp: 90 tablet, Rfl: 3  •  pantoprazole (PROTONIX) 40 MG EC tablet, Take 1 tablet by mouth 2 (Two) Times a Day., Disp: 180 tablet, Rfl: 3  •  rosuvastatin (CRESTOR) 20 MG tablet, Take 1 tablet by mouth Daily., Disp: 90 tablet, Rfl: 3  •  Spiriva Respimat 2.5 MCG/ACT aerosol solution inhaler, INHALE 2 PUFFS BY MOUTH DAILY, Disp: 4 g, Rfl: 3  •  Symbicort 160-4.5 MCG/ACT inhaler, Inhale 2 puffs 2 (Two) Times a Day., Disp: , Rfl:   •  vitamin B-12 (CYANOCOBALAMIN) 1000 MCG tablet, Take 1,000 mcg by mouth Daily., Disp: , Rfl:   •  vitamin D3 125 MCG (5000 UT) capsule capsule, Take 5,000 Units by mouth. TAKE 1 CAPSULE  3 TIMES A WEEK, Disp: , Rfl:   •  Zinc 50 MG capsule, Take  by mouth., Disp: , Rfl:   •  azithromycin (Zithromax) 250 MG tablet, Take 2 tablets by oral route QD x 1 day; then 1 tablet by oral route QD x 4 days, Disp: 6 tablet,  Rfl: 0  Allergies: Latex, Sulfa antibiotics, Sulfamethoxazole, and Trimethoprim    Physical Exam  Constitutional:       Appearance: Normal appearance.   HENT:      Right Ear: Tympanic membrane normal.      Left Ear: Tympanic membrane normal.      Mouth/Throat:      Pharynx: Oropharynx is clear.   Eyes:      Conjunctiva/sclera: Conjunctivae normal.      Pupils: Pupils are equal, round, and reactive to light.   Cardiovascular:      Rate and Rhythm: Normal rate and regular rhythm.      Heart sounds: Normal heart sounds.   Pulmonary:      Effort: Pulmonary effort is normal.      Breath sounds: Normal breath sounds.      Comments: Minimal expiratory wheeze noted.   Abdominal:      Palpations: Abdomen is soft.      Tenderness: There is no abdominal tenderness.   Neurological:      Mental Status: She is oriented to person, place, and time.   Psychiatric:         Mood and Affect: Mood normal.         Behavior: Behavior normal.             Assessment and Plan   Diagnoses and all orders for this visit:    1. Bronchitis (Primary)  Patient gets bronchitis several times /year and states this feels similar. Will send in antibiotic- she will monitor her breathing and has prescription for steroid if needed. RTC prn.   2. Cough, unspecified type  -     Covid-19 + Flu A&B AG, Veritor    Other orders  -     azithromycin (Zithromax) 250 MG tablet; Take 2 tablets by oral route QD x 1 day; then 1 tablet by oral route QD x 4 days  Dispense: 6 tablet; Refill: 0            Follow Up   No follow-ups on file.  Patient was given instructions and counseling regarding her condition or for health maintenance advice. Please see specific information pulled into the AVS if appropriate.     Nata Rivera PA-C

## 2023-02-16 ENCOUNTER — TELEPHONE (OUTPATIENT)
Dept: FAMILY MEDICINE CLINIC | Facility: CLINIC | Age: 63
End: 2023-02-16
Payer: COMMERCIAL

## 2023-02-16 RX ORDER — METHYLPREDNISOLONE 4 MG/1
TABLET ORAL
COMMUNITY
Start: 2023-02-13 | End: 2023-02-21

## 2023-02-16 NOTE — TELEPHONE ENCOUNTER
Patient said she has been taking the zpack and the prednisone that Dr. Tubbs gave her on 2/13 too. Said she is almost done with those. Also she is taking mucinex too. She asked what else she can try for the nasal and chest congestion?

## 2023-02-16 NOTE — TELEPHONE ENCOUNTER
Caller: Skye Barlow    Relationship: Self    Best call back number: [phone]    What is the best time to reach you: ANYTIME    Who are you requesting to speak with (clinical staff, provider,  specific staff member): CLINICAL    Do you know the name of the person who called: RON    What was the call regarding: PATIENT WOULD LIKE TO DISCUSS MEDICATION FOR HER COPD FROM THE RECENT VISIT    Do you require a callback: PLEASE ADVISE

## 2023-02-21 ENCOUNTER — OFFICE VISIT (OUTPATIENT)
Dept: FAMILY MEDICINE CLINIC | Facility: CLINIC | Age: 63
End: 2023-02-21
Payer: COMMERCIAL

## 2023-02-21 VITALS
OXYGEN SATURATION: 94 % | HEART RATE: 98 BPM | BODY MASS INDEX: 29.41 KG/M2 | WEIGHT: 136.31 LBS | HEIGHT: 57 IN | RESPIRATION RATE: 18 BRPM | SYSTOLIC BLOOD PRESSURE: 108 MMHG | DIASTOLIC BLOOD PRESSURE: 70 MMHG

## 2023-02-21 DIAGNOSIS — J40 BRONCHITIS: ICD-10-CM

## 2023-02-21 DIAGNOSIS — J44.1 COPD WITH ACUTE EXACERBATION: ICD-10-CM

## 2023-02-21 DIAGNOSIS — R09.81 NASAL CONGESTION: Primary | ICD-10-CM

## 2023-02-21 PROCEDURE — 99213 OFFICE O/P EST LOW 20 MIN: CPT | Performed by: PHYSICIAN ASSISTANT

## 2023-02-21 NOTE — PROGRESS NOTES
"Chief Complaint  Cough (Cough for one week. Liset gave her an antibiotic last week. Has history of copd. ) and Nasal Congestion    Subjective        Skye Barlow presents to [facility]  History of Present Illness  Patient states for over a week she has been feeling bad.  She states that her nose has been stopped up and she is had a lot of postnasal drainage.  She has had a bit of a cough as well.  She states the cough has been keeping her up at night but now it is getting better.  She is using her albuterol about every 4 hours.  She states that she usually has to increase her albuterol use when she gets sick.  She was seen by Liset last week and placed on a Z-Colin.  She states that she feels like the cough and chest congestion is better but still has a lot of nasal congestion.  Currently she is taking no nasal sprays.  .   Objective   Vital Signs:  /70 (BP Location: Left arm, Patient Position: Sitting, Cuff Size: Adult)   Pulse 98   Resp 18   Ht 144.8 cm (57\")   Wt 61.8 kg (136 lb 5 oz)   SpO2 94%   BMI 29.50 kg/m²   Estimated body mass index is 29.5 kg/m² as calculated from the following:    Height as of this encounter: 144.8 cm (57\").    Weight as of this encounter: 61.8 kg (136 lb 5 oz).             Physical Exam   Result Review :       Office Visit with Nata Rivera PA-C (02/13/2023)               Assessment and Plan   Diagnoses and all orders for this visit:    1. Nasal congestion (Primary)  Will add Mucinex DM and saline nasal spray to help relieve symptoms. If she finds her cough worsens or starts to have a fever or discolored drainage, she is to give our office a call  2. Bronchitis  Improved- finish medication and continue albuterol as needed  3. COPD with acute exacerbation (HCC)  Improving- see treatment as above and continue Symbicort and Spiriva daily       Follow Up   No follow-ups on file.  Patient was given instructions and counseling regarding her " condition or for health maintenance advice. Please see specific information pulled into the AVS if appropriate.

## 2023-03-09 ENCOUNTER — OFFICE VISIT (OUTPATIENT)
Dept: CARDIOLOGY | Facility: CLINIC | Age: 63
End: 2023-03-09
Payer: COMMERCIAL

## 2023-03-09 VITALS
HEART RATE: 68 BPM | OXYGEN SATURATION: 94 % | WEIGHT: 138 LBS | DIASTOLIC BLOOD PRESSURE: 72 MMHG | RESPIRATION RATE: 18 BRPM | TEMPERATURE: 97.5 F | SYSTOLIC BLOOD PRESSURE: 130 MMHG | BODY MASS INDEX: 29.77 KG/M2 | HEIGHT: 57 IN

## 2023-03-09 DIAGNOSIS — I10 ESSENTIAL HYPERTENSION: Primary | ICD-10-CM

## 2023-03-09 DIAGNOSIS — Z72.0 TOBACCO USE: ICD-10-CM

## 2023-03-09 DIAGNOSIS — J43.2 CENTRILOBULAR EMPHYSEMA: ICD-10-CM

## 2023-03-09 DIAGNOSIS — I10 ESSENTIAL HYPERTENSION, BENIGN: ICD-10-CM

## 2023-03-09 DIAGNOSIS — E78.5 HYPERLIPIDEMIA LDL GOAL <100: ICD-10-CM

## 2023-03-09 DIAGNOSIS — E78.2 MIXED HYPERLIPIDEMIA: ICD-10-CM

## 2023-03-09 PROBLEM — J44.1 COPD WITH ACUTE EXACERBATION: Status: RESOLVED | Noted: 2021-09-10 | Resolved: 2023-03-09

## 2023-03-09 PROBLEM — J44.9 COPD (CHRONIC OBSTRUCTIVE PULMONARY DISEASE): Status: ACTIVE | Noted: 2021-09-10

## 2023-03-09 PROCEDURE — 99214 OFFICE O/P EST MOD 30 MIN: CPT | Performed by: INTERNAL MEDICINE

## 2023-03-09 PROCEDURE — 93000 ELECTROCARDIOGRAM COMPLETE: CPT | Performed by: INTERNAL MEDICINE

## 2023-03-09 RX ORDER — ROSUVASTATIN CALCIUM 20 MG/1
20 TABLET, COATED ORAL DAILY
Qty: 90 TABLET | Refills: 3 | Status: SHIPPED | OUTPATIENT
Start: 2023-03-09

## 2023-03-09 RX ORDER — METOPROLOL SUCCINATE 50 MG/1
50 TABLET, EXTENDED RELEASE ORAL DAILY
Qty: 90 TABLET | Refills: 3 | Status: SHIPPED | OUTPATIENT
Start: 2023-03-09

## 2023-03-09 RX ORDER — LISINOPRIL 20 MG/1
20 TABLET ORAL DAILY
Qty: 90 TABLET | Refills: 3 | Status: SHIPPED | OUTPATIENT
Start: 2023-03-09

## 2023-03-09 NOTE — PROGRESS NOTES
[facility]  [facility]  [address] [place] [address]  Dept: [phone]  Dept Fax: [phone]    Skye Barlow  1960    Follow Up Office Visit Note    History of Present Illness:  Skye Barlow is a 62 y.o. female who presents to the clinic for Follow-up. Hypertension- The BP is 110.60, no complaints, no palpitations, has some SOB but better off oxygen, has COPD and still smoking on Lisinopril 20 mg and Toprol xl 50 mg    The following portions of the patient's history were reviewed and updated as appropriate: allergies, current medications, past family history, past medical history, past social history, past surgical history, and problem list.    Medications:  albuterol  albuterol sulfate HFA  hydrOXYzine  lisinopril  loratadine  metoprolol succinate XL  pantoprazole  rosuvastatin  Spiriva Respimat aerosol solution  Symbicort aerosol    Subjective  Allergies   Allergen Reactions   • Latex Unknown - High Severity   • Sulfa Antibiotics Hives   • Sulfamethoxazole Rash   • Trimethoprim Rash        Past Medical History:   Diagnosis Date   • Anxiety and depression    • Anxiety state    • Benign essential hypertension    • Chronic obstructive lung disease (HCC)    • COPD (chronic obstructive pulmonary disease) (HCC)    • Depressive disorder    • Gastroesophageal reflux disease    • Migraines    • Psoriasis    • TMJ (dislocation of temporomandibular joint)    • Tobacco abuse        Past Surgical History:   Procedure Laterality Date   •  SECTION      X3       Family History   Problem Relation Age of Onset   • Hypertension Mother    • Hypertension Father    • Dementia Father    • Colon cancer Maternal Grandmother    • Coronary artery disease Paternal Grandmother    • Diabetes Paternal Grandmother    • Ovarian cancer Paternal Aunt    • Hypertension Other    • Diabetes Other    • Breast cancer Neg Hx         Social History     Socioeconomic History   •  "Marital status:    Tobacco Use   • Smoking status: Every Day     Packs/day: 0.25     Years: 41.00     Pack years: 10.25     Types: Cigarettes     Start date: 4/15/1981   • Smokeless tobacco: Never   Vaping Use   • Vaping Use: Never used   Substance and Sexual Activity   • Alcohol use: Never   • Drug use: Never   • Sexual activity: Defer       Review of Systems   Constitutional: Negative.    HENT: Negative.    Respiratory: Negative.    Cardiovascular: Negative.    Endocrine: Negative.    Genitourinary: Negative.    Musculoskeletal: Negative.    Skin: Negative.    Allergic/Immunologic: Negative.    Neurological: Negative.    Hematological: Negative.    Psychiatric/Behavioral: Negative.        Cardiovascular Procedures    ECHO/MUGA:  STRESS TESTS:   CARDIAC CATH:   DEVICES:   HOLTER:   CT/MRI:   VASCULAR:   CARDIOTHORACIC:     Objective  Vitals:    03/09/23 1250   BP: 130/72   BP Location: Right arm   Patient Position: Lying   Cuff Size: Adult   Pulse: 68   Resp: 18   Temp: 97.5 °F (36.4 °C)   TempSrc: Infrared   SpO2: 94%   Weight: 62.6 kg (138 lb)   Height: 144.8 cm (57\")   PainSc: 0-No pain     Body mass index is 29.86 kg/m².     Physical Exam  Constitutional:       Appearance: Healthy appearance. Not in distress.   Neck:      Vascular: No JVR. JVD normal.   Pulmonary:      Effort: Pulmonary effort is normal.      Breath sounds: Normal breath sounds. No wheezing. No rhonchi. No rales.   Chest:      Chest wall: Not tender to palpatation.   Cardiovascular:      PMI at left midclavicular line. Normal rate. Regular rhythm. Normal S1. Normal S2.      Murmurs: There is no murmur.      No gallop. No click. No rub.   Pulses:     Intact distal pulses.   Edema:     Peripheral edema absent.   Abdominal:      General: Bowel sounds are normal.      Palpations: Abdomen is soft.      Tenderness: There is no abdominal tenderness.   Musculoskeletal: Normal range of motion.         General: No tenderness. Skin:     General: " Skin is warm and dry.   Neurological:      General: No focal deficit present.      Mental Status: Alert and oriented to person, place and time.          Diagnostic Data    ECG 12 Lead    Date/Time: 3/9/2023 1:04 PM  Performed by: Andre Burt MD  Authorized by: Andre Burt MD   Comparison: compared with previous ECG from 9/10/2021  Similar to previous ECG  Rhythm: sinus rhythm  Rate: normal  BPM: 95  QRS axis: normal    Clinical impression: normal ECG            Assessment and Plan  Diagnoses and all orders for this visit:        Tobacco use- still smoking     Centrilobular emphysema (HCC)- Off oxygen     Essential hypertension, benign- BP is good, will keep same meds BP is 110.60,  -     metoprolol succinate XL (TOPROL-XL) 50 MG 24 hr tablet; Take 1 tablet by mouth Daily.    Mixed hyperlipidemia- Lipids are good   -     rosuvastatin (CRESTOR) 20 MG tablet; Take 1 tablet by mouth Daily.    Other orders  -     lisinopril (PRINIVIL,ZESTRIL) 20 MG tablet; Take 1 tablet by mouth Daily.         Return in about 6 months (around 9/9/2023) for Recheck with Dr. Burt.    Andre Burt MD  03/09/2023

## 2023-03-18 ENCOUNTER — OFFICE VISIT (OUTPATIENT)
Dept: FAMILY MEDICINE CLINIC | Facility: CLINIC | Age: 63
End: 2023-03-18
Payer: COMMERCIAL

## 2023-03-18 VITALS
SYSTOLIC BLOOD PRESSURE: 156 MMHG | DIASTOLIC BLOOD PRESSURE: 84 MMHG | BODY MASS INDEX: 30.2 KG/M2 | WEIGHT: 140 LBS | HEART RATE: 111 BPM | HEIGHT: 57 IN | TEMPERATURE: 98.1 F | OXYGEN SATURATION: 94 %

## 2023-03-18 DIAGNOSIS — J43.2 CENTRILOBULAR EMPHYSEMA: Primary | ICD-10-CM

## 2023-03-18 DIAGNOSIS — J40 BRONCHITIS: ICD-10-CM

## 2023-03-18 PROCEDURE — 99214 OFFICE O/P EST MOD 30 MIN: CPT | Performed by: NURSE PRACTITIONER

## 2023-03-18 RX ORDER — AZITHROMYCIN 250 MG/1
TABLET, FILM COATED ORAL
Qty: 6 TABLET | Refills: 0 | Status: SHIPPED | OUTPATIENT
Start: 2023-03-18 | End: 2023-03-23

## 2023-03-18 RX ORDER — PREDNISONE 20 MG/1
TABLET ORAL
Qty: 18 TABLET | Refills: 0 | Status: SHIPPED | OUTPATIENT
Start: 2023-03-18

## 2023-03-18 RX ORDER — ALBUTEROL SULFATE 2.5 MG/3ML
2.5 SOLUTION RESPIRATORY (INHALATION) EVERY 4 HOURS PRN
Qty: 360 ML | Refills: 1 | Status: SHIPPED | OUTPATIENT
Start: 2023-03-18

## 2023-03-18 NOTE — PROGRESS NOTES
"Chief Complaint  Shortness of Breath (Pt is here today for SOB. )    Subjective          Skye Barlow presents to [facility]  History of Present Illness  Pt has had cough and shortness of breath x 1 month. She denies fever, chills, or myalgias.       Objective   Vital Signs:   /84   Pulse 111   Temp 98.1 °F (36.7 °C)   Ht 144.8 cm (57\")   Wt 63.5 kg (140 lb)   SpO2 94%   BMI 30.30 kg/m²     Body mass index is 30.3 kg/m².    Review of Systems   Constitutional: Negative for chills, fatigue and fever.   Respiratory: Positive for cough, shortness of breath and wheezing.    Cardiovascular: Negative for chest pain, palpitations and leg swelling.   Neurological: Negative for syncope.   Psychiatric/Behavioral: The patient is not nervous/anxious.           Current Outpatient Medications:   •  albuterol (PROVENTIL) (2.5 MG/3ML) 0.083% nebulizer solution, Take 2.5 mg by nebulization Every 4 (Four) Hours As Needed for Wheezing., Disp: 360 mL, Rfl: 1  •  albuterol (PROVENTIL) 4 MG tablet, Take 2 tablets by mouth 2 (Two) Times a Day., Disp: , Rfl:   •  albuterol sulfate  (90 Base) MCG/ACT inhaler, Inhale 2 puffs Every 4 (Four) Hours As Needed for Wheezing., Disp: 8.5 g, Rfl: 1  •  hydrOXYzine (ATARAX) 10 MG tablet, Take one tablet by oral route every 6 hrs, as needed for anxiety, Disp: 20 tablet, Rfl: 0  •  lisinopril (PRINIVIL,ZESTRIL) 20 MG tablet, Take 1 tablet by mouth Daily., Disp: 90 tablet, Rfl: 3  •  loratadine (Claritin) 10 MG tablet, Take 1 tablet by mouth Daily., Disp: 90 tablet, Rfl: 3  •  metoprolol succinate XL (TOPROL-XL) 50 MG 24 hr tablet, Take 1 tablet by mouth Daily., Disp: 90 tablet, Rfl: 3  •  pantoprazole (PROTONIX) 40 MG EC tablet, Take 1 tablet by mouth 2 (Two) Times a Day., Disp: 180 tablet, Rfl: 3  •  rosuvastatin (CRESTOR) 20 MG tablet, Take 1 tablet by mouth Daily., Disp: 90 tablet, Rfl: 3  •  Spiriva Respimat 2.5 MCG/ACT aerosol solution inhaler, " INHALE 2 PUFFS BY MOUTH DAILY, Disp: 4 g, Rfl: 3  •  Symbicort 160-4.5 MCG/ACT inhaler, Inhale 2 puffs 2 (Two) Times a Day., Disp: , Rfl:   •  azithromycin (Zithromax Z-Colin) 250 MG tablet, Take 2 tablets by mouth Daily for 1 day, THEN 1 tablet Daily for 4 days., Disp: 6 tablet, Rfl: 0  •  predniSONE (DELTASONE) 20 MG tablet, 3 QD x 3 days, 2 QD x 3 days, 1 QD x 3 days, Disp: 18 tablet, Rfl: 0      Allergies: Latex, Sulfa antibiotics, Sulfamethoxazole, and Trimethoprim    Physical Exam  Constitutional:       Appearance: Normal appearance.   HENT:      Head: Normocephalic.   Eyes:      Conjunctiva/sclera: Conjunctivae normal.      Pupils: Pupils are equal, round, and reactive to light.   Cardiovascular:      Rate and Rhythm: Normal rate and regular rhythm.      Heart sounds: Normal heart sounds.   Pulmonary:      Effort: Pulmonary effort is normal.      Breath sounds: Examination of the right-upper field reveals wheezing. Examination of the left-upper field reveals wheezing. Examination of the right-middle field reveals wheezing. Examination of the left-middle field reveals wheezing. Examination of the right-lower field reveals wheezing. Examination of the left-lower field reveals wheezing. Wheezing present.   Abdominal:      Tenderness: There is no abdominal tenderness.   Musculoskeletal:         General: Normal range of motion.   Skin:     General: Skin is warm and dry.      Capillary Refill: Capillary refill takes less than 2 seconds.   Neurological:      General: No focal deficit present.      Mental Status: She is alert and oriented to person, place, and time.   Psychiatric:         Mood and Affect: Mood normal.         Behavior: Behavior normal.         Thought Content: Thought content normal.         Judgment: Judgment normal.          Result Review :                   Assessment and Plan    Diagnoses and all orders for this visit:    1. Centrilobular emphysema (HCC) (Primary)  Comments:  F/U with pulm as  scheduled.   Orders:  -     albuterol (PROVENTIL) (2.5 MG/3ML) 0.083% nebulizer solution; Take 2.5 mg by nebulization Every 4 (Four) Hours As Needed for Wheezing.  Dispense: 360 mL; Refill: 1    2. Bronchitis  Comments:  Finish antibiotics and steroids. Albuterol as needed for wheezing. Return for worsened sx and if not improving in 1 week.   Orders:  -     predniSONE (DELTASONE) 20 MG tablet; 3 QD x 3 days, 2 QD x 3 days, 1 QD x 3 days  Dispense: 18 tablet; Refill: 0  -     azithromycin (Zithromax Z-Colin) 250 MG tablet; Take 2 tablets by mouth Daily for 1 day, THEN 1 tablet Daily for 4 days.  Dispense: 6 tablet; Refill: 0                Follow Up   Return in about 1 week (around 3/25/2023) for if not improving or sooner if symptoms worsen.  Patient was given instructions and counseling regarding her condition or for health maintenance advice. Please see specific information pulled into the AVS if appropriate.     RICKEY Lion

## 2023-04-05 RX ORDER — TIOTROPIUM BROMIDE INHALATION SPRAY 3.12 UG/1
SPRAY, METERED RESPIRATORY (INHALATION)
Qty: 4 G | Refills: 3 | Status: SHIPPED | OUTPATIENT
Start: 2023-04-05

## 2023-06-12 DIAGNOSIS — J43.2 CENTRILOBULAR EMPHYSEMA: ICD-10-CM

## 2023-06-12 RX ORDER — ALBUTEROL SULFATE 2.5 MG/3ML
SOLUTION RESPIRATORY (INHALATION)
Qty: 360 ML | Refills: 1 | Status: SHIPPED | OUTPATIENT
Start: 2023-06-12

## 2023-07-26 ENCOUNTER — OFFICE VISIT (OUTPATIENT)
Dept: FAMILY MEDICINE CLINIC | Facility: CLINIC | Age: 63
End: 2023-07-26
Payer: COMMERCIAL

## 2023-07-26 VITALS
BODY MASS INDEX: 31.18 KG/M2 | DIASTOLIC BLOOD PRESSURE: 78 MMHG | OXYGEN SATURATION: 94 % | SYSTOLIC BLOOD PRESSURE: 138 MMHG | TEMPERATURE: 97.5 F | HEART RATE: 98 BPM | RESPIRATION RATE: 20 BRPM | WEIGHT: 144.1 LBS

## 2023-07-26 DIAGNOSIS — Z72.0 TOBACCO USE: ICD-10-CM

## 2023-07-26 DIAGNOSIS — E78.5 HYPERLIPIDEMIA LDL GOAL <100: ICD-10-CM

## 2023-07-26 DIAGNOSIS — M12.812 ROTATOR CUFF ARTHROPATHY OF LEFT SHOULDER: Primary | ICD-10-CM

## 2023-07-26 DIAGNOSIS — I10 ESSENTIAL HYPERTENSION: ICD-10-CM

## 2023-07-26 DIAGNOSIS — K21.00 GASTROESOPHAGEAL REFLUX DISEASE WITH ESOPHAGITIS WITHOUT HEMORRHAGE: ICD-10-CM

## 2023-07-26 DIAGNOSIS — E78.2 MIXED HYPERLIPIDEMIA: ICD-10-CM

## 2023-07-26 DIAGNOSIS — J43.2 CENTRILOBULAR EMPHYSEMA: ICD-10-CM

## 2023-07-26 DIAGNOSIS — J30.89 SEASONAL ALLERGIC RHINITIS DUE TO OTHER ALLERGIC TRIGGER: ICD-10-CM

## 2023-07-26 DIAGNOSIS — F06.4 ANXIETY DISORDER DUE TO GENERAL MEDICAL CONDITION: ICD-10-CM

## 2023-07-26 RX ORDER — LORAZEPAM 0.5 MG/1
0.5 TABLET ORAL EVERY 8 HOURS PRN
Qty: 30 TABLET | Refills: 1 | Status: SHIPPED | OUTPATIENT
Start: 2023-07-26

## 2023-07-26 NOTE — PROGRESS NOTES
Office Note     Name: Skye Barlow    : 1960     MRN: [id number]     Chief Complaint  Shoulder Pain    Subjective     History of Present Illness:  Skye Barlow is a 63 y.o. female who presents today for #1 left shoulder pain, #2 nervous when she is quitting smokes 1 or 2 cigarettes a day, #3 reflux had improved on Protonix twice a day.  Daughter in here to does most of the talking.  She got a lot on her mind, COPD, sister has lung cancer and it spread, she did get off oxygen.  We will substitute Ativan 0.5 mg a day or 2.  She thought the Protonix decrease, made it known to her less acidic get back on it    Review of Systems:   Review of Systems    Past Medical History:   Past Medical History:   Diagnosis Date   • Anxiety and depression    • Anxiety state    • Benign essential hypertension    • Chronic obstructive lung disease    • COPD (chronic obstructive pulmonary disease)    • Depressive disorder    • Gastroesophageal reflux disease    • Migraines    • Psoriasis    • TMJ (dislocation of temporomandibular joint)    • Tobacco abuse        Past Surgical History:   Past Surgical History:   Procedure Laterality Date   •  SECTION      X3       Family History:   Family History   Problem Relation Age of Onset   • Hypertension Mother    • Hypertension Father    • Dementia Father    • Colon cancer Maternal Grandmother    • Coronary artery disease Paternal Grandmother    • Diabetes Paternal Grandmother    • Ovarian cancer Paternal Aunt    • Hypertension Other    • Diabetes Other    • Breast cancer Neg Hx        Social History:   Social History     Socioeconomic History   • Marital status:    Tobacco Use   • Smoking status: Every Day     Packs/day: 0.25     Years: 41.00     Pack years: 10.25     Types: Cigarettes     Start date: 4/15/1981   • Smokeless tobacco: Never   Vaping Use   • Vaping Use: Never used   Substance and Sexual Activity   • Alcohol use: Never   • Drug use: Never   •  Sexual activity: Defer       Immunizations:   Immunization History   Administered Date(s) Administered   • COVID-19 (PFIZER) Purple Cap Monovalent 08/31/2021, 10/11/2021   • Flu Vaccine Quad PF >36MO 12/09/2019, 10/25/2021   • Fluzone Quad >6mos (Multi-dose) 11/12/2020   • Influenza, Unspecified 11/08/2022   • Pneumococcal Conjugate 13-Valent (PCV13) 12/05/2017   • Pneumococcal Polysaccharide (PPSV23) 07/09/2019   • Tdap 11/25/2014        Medications:     Current Outpatient Medications:   •  albuterol (PROVENTIL) (2.5 MG/3ML) 0.083% nebulizer solution, INHALE 1 VIAL BY NEBULIZATION EVERY 4 HOURS AS NEEDED FOR WHEEZING, Disp: 360 mL, Rfl: 1  •  albuterol (PROVENTIL) 4 MG tablet, Take 2 tablets by mouth 2 (Two) Times a Day., Disp: , Rfl:   •  albuterol sulfate  (90 Base) MCG/ACT inhaler, Inhale 2 puffs Every 4 (Four) Hours As Needed for Wheezing., Disp: 8.5 g, Rfl: 1  •  lisinopril (PRINIVIL,ZESTRIL) 20 MG tablet, Take 1 tablet by mouth Daily., Disp: 90 tablet, Rfl: 3  •  loratadine (Claritin) 10 MG tablet, Take 1 tablet by mouth Daily., Disp: 90 tablet, Rfl: 3  •  metoprolol succinate XL (TOPROL-XL) 50 MG 24 hr tablet, Take 1 tablet by mouth Daily., Disp: 90 tablet, Rfl: 3  •  pantoprazole (PROTONIX) 40 MG EC tablet, Take 1 tablet by mouth 2 (Two) Times a Day., Disp: 180 tablet, Rfl: 3  •  rosuvastatin (CRESTOR) 20 MG tablet, Take 1 tablet by mouth Daily., Disp: 90 tablet, Rfl: 3  •  Spiriva Respimat 2.5 MCG/ACT aerosol solution inhaler, INHALE 2 PUFFS BY MOUTH DAILY, Disp: 4 g, Rfl: 3  •  Symbicort 160-4.5 MCG/ACT inhaler, Inhale 2 puffs 2 (Two) Times a Day., Disp: , Rfl:   •  hydrOXYzine (ATARAX) 10 MG tablet, Take one tablet by oral route every 6 hrs, as needed for anxiety (Patient not taking: Reported on 7/26/2023), Disp: 20 tablet, Rfl: 0  •  LORazepam (Ativan) 0.5 MG tablet, Take 1 tablet by mouth Every 8 (Eight) Hours As Needed for Anxiety., Disp: 30 tablet, Rfl: 1    Allergies:   Allergies   Allergen  "Reactions   • Latex Unknown - High Severity   • Sulfa Antibiotics Hives   • Sulfamethoxazole Rash   • Trimethoprim Rash       Objective     Vital Signs  /78 (BP Location: Left arm, Patient Position: Sitting, Cuff Size: Adult)   Pulse 98   Temp 97.5 °F (36.4 °C) (Temporal)   Resp 20   Wt 65.4 kg (144 lb 1.6 oz)   SpO2 94%   BMI 31.18 kg/m²   Estimated body mass index is 31.18 kg/m² as calculated from the following:    Height as of 3/18/23: 144.8 cm (57\").    Weight as of this encounter: 65.4 kg (144 lb 1.6 oz).          Physical Exam  Vitals and nursing note reviewed.   Constitutional:       Appearance: Normal appearance. She is normal weight.   HENT:      Head: Normocephalic.      Right Ear: External ear normal.      Left Ear: External ear normal.      Nose: Nose normal.   Eyes:      Pupils: Pupils are equal, round, and reactive to light.   Cardiovascular:      Rate and Rhythm: Normal rate and regular rhythm.      Pulses: Normal pulses.      Heart sounds: Normal heart sounds.   Pulmonary:      Effort: Pulmonary effort is normal.      Breath sounds: Normal breath sounds.   Musculoskeletal:        Arms:       Cervical back: Normal range of motion and neck supple.      Comments: Is all kinds of stuff grinding.  Kenalog shot helped tad.  Keep her awake tonight, very little use of it   Skin:     General: Skin is warm and dry.   Neurological:      Mental Status: She is alert.   Psychiatric:         Mood and Affect: Mood normal.        Procedures     Assessment and Plan     1. Rotator cuff arthropathy of left shoulder  Could not move 120 degrees past vertical.  She is got some soreness there  - MRI Shoulder Left Without Contrast; Future    2. Essential hypertension  Controlled    3. Anxiety disorder due to general medical condition    - LORazepam (Ativan) 0.5 MG tablet; Take 1 tablet by mouth Every 8 (Eight) Hours As Needed for Anxiety.  Dispense: 30 tablet; Refill: 1    4. Hyperlipidemia LDL goal <100      5. " Centrilobular emphysema      6. Tobacco use  We will have to quit smoking in other words low-dose CT of the chest  - CT Chest Low Dose Follow Up Without Contrast; Future    7. Mixed hyperlipidemia      8. Gastroesophageal reflux disease with esophagitis without hemorrhage  Made it clear to her oh I want her on Protonix twice a day    9. Seasonal allergic rhinitis due to other allergic trigger         Follow Up  Return in about 3 months (around 10/26/2023).    [facility]  [address] [place] [id number] [phone]

## 2023-07-31 RX ORDER — TIOTROPIUM BROMIDE INHALATION SPRAY 3.12 UG/1
SPRAY, METERED RESPIRATORY (INHALATION)
Qty: 4 G | Refills: 3 | Status: SHIPPED | OUTPATIENT
Start: 2023-07-31

## 2023-09-01 ENCOUNTER — TELEPHONE (OUTPATIENT)
Dept: FAMILY MEDICINE CLINIC | Facility: CLINIC | Age: 63
End: 2023-09-01
Payer: MEDICARE

## 2023-09-01 DIAGNOSIS — M12.812 ROTATOR CUFF ARTHROPATHY OF LEFT SHOULDER: Primary | ICD-10-CM

## 2023-09-01 NOTE — TELEPHONE ENCOUNTER
----- Message from Rod Stone MD sent at 8/31/2023  5:11 PM EDT -----  Your left shoulder has a partial full thickness tear in the supraspinatus.  Mild AC joint neuropathy and minimal bursitis.  I did have you see Dr. Diane?

## 2023-09-01 NOTE — TELEPHONE ENCOUNTER
HUB TO READ: CALLED PT, NO ANSWER, UNABLE TO LVM. Your left shoulder has a partial full thickness tear in the supraspinatus.  Mild AC joint neuropathy and minimal bursitis.  I did have you see Dr. Diane?

## 2023-09-01 NOTE — TELEPHONE ENCOUNTER
Paul Martel MA     EARL     2:49 PM  Note      HUB TO READ: CALLED PT, NO ANSWER, UNABLE TO LVM. Your left shoulder has a partial full thickness tear in the supraspinatus.  Mild AC joint neuropathy and minimal bursitis.  I did have you see Dr. Rosales?       PATIENT INFORMED OF HUB TO READ, UNDERSTOOD, NO HAS NOT SEEN DR ROSALES PLEASE ADVISE

## 2023-09-14 ENCOUNTER — TELEPHONE (OUTPATIENT)
Dept: FAMILY MEDICINE CLINIC | Facility: CLINIC | Age: 63
End: 2023-09-14
Payer: MEDICARE

## 2023-09-14 NOTE — TELEPHONE ENCOUNTER
Caller: KAYLA DE LA CRUZ    Relationship: Emergency Contact    Best call back number: [phone]     What test was performed: SCAN OF SHOULDER - LEFT     When was the test performed: WITHIN A COUPLE MONTHS     Where was the test performed: [facility]     Additional notes: PLEASE HAVE THE RESULTS READY FOR  ASAP.    PLEASE CALL BACK, IF NO ANSWER LEAVE A DETAILED MESSAGE.

## 2023-09-25 DIAGNOSIS — J43.2 CENTRILOBULAR EMPHYSEMA: ICD-10-CM

## 2023-09-26 RX ORDER — ALBUTEROL SULFATE 2.5 MG/3ML
SOLUTION RESPIRATORY (INHALATION)
Qty: 360 ML | Refills: 1 | Status: SHIPPED | OUTPATIENT
Start: 2023-09-26

## 2023-10-23 ENCOUNTER — OFFICE VISIT (OUTPATIENT)
Dept: FAMILY MEDICINE CLINIC | Facility: CLINIC | Age: 63
End: 2023-10-23
Payer: MEDICARE

## 2023-10-23 VITALS
SYSTOLIC BLOOD PRESSURE: 146 MMHG | DIASTOLIC BLOOD PRESSURE: 82 MMHG | WEIGHT: 148 LBS | OXYGEN SATURATION: 97 % | BODY MASS INDEX: 31.93 KG/M2 | HEIGHT: 57 IN | TEMPERATURE: 97.9 F | HEART RATE: 106 BPM

## 2023-10-23 DIAGNOSIS — J44.1 COPD WITH ACUTE EXACERBATION: Primary | ICD-10-CM

## 2023-10-23 RX ORDER — CEFDINIR 300 MG/1
CAPSULE ORAL
COMMUNITY
Start: 2023-10-10

## 2023-10-23 RX ORDER — DOXYCYCLINE HYCLATE 100 MG/1
1 CAPSULE ORAL EVERY 12 HOURS SCHEDULED
COMMUNITY
Start: 2023-10-22 | End: 2023-10-23

## 2023-10-23 RX ORDER — METHYLPREDNISOLONE SODIUM SUCCINATE 40 MG/ML
40 INJECTION, POWDER, LYOPHILIZED, FOR SOLUTION INTRAMUSCULAR; INTRAVENOUS ONCE
Status: COMPLETED | OUTPATIENT
Start: 2023-10-23 | End: 2023-10-23

## 2023-10-23 RX ORDER — METHYLPREDNISOLONE SODIUM SUCCINATE 40 MG/ML
40 INJECTION, POWDER, LYOPHILIZED, FOR SOLUTION INTRAMUSCULAR; INTRAVENOUS ONCE
Status: DISCONTINUED | OUTPATIENT
Start: 2023-10-23 | End: 2023-10-23

## 2023-10-23 RX ORDER — PREDNISONE 20 MG/1
TABLET ORAL
Qty: 18 TABLET | Refills: 0 | Status: SHIPPED | OUTPATIENT
Start: 2023-10-23

## 2023-10-23 RX ORDER — PREDNISONE 20 MG/1
TABLET ORAL
COMMUNITY
Start: 2023-10-22 | End: 2023-10-23

## 2023-10-23 RX ORDER — METOPROLOL SUCCINATE AND HYDROCHLOROTHIAZIDE 12.5; 5 MG/1; MG/1
TABLET ORAL
COMMUNITY
End: 2023-10-23

## 2023-10-23 RX ORDER — FLUTICASONE PROPIONATE 50 MCG
SPRAY, SUSPENSION (ML) NASAL
COMMUNITY
Start: 2023-09-08

## 2023-10-23 RX ORDER — GUAIFENESIN 600 MG/1
1 TABLET, EXTENDED RELEASE ORAL EVERY 12 HOURS SCHEDULED
COMMUNITY
Start: 2023-10-10

## 2023-10-23 RX ADMIN — METHYLPREDNISOLONE SODIUM SUCCINATE 40 MG: 40 INJECTION, POWDER, LYOPHILIZED, FOR SOLUTION INTRAMUSCULAR; INTRAVENOUS at 12:06

## 2023-10-23 NOTE — PROGRESS NOTES
Office Note     Name: Skye Barlow    : 1960     MRN: [id number]     Chief Complaint  Nasal Congestion and Rib Pain    Subjective     History of Present Illness:  Skye Barlow is a 63 y.o. female who presents today for:    1 week ago started having cough and congestion, went to  [facility], they gave her low dose of steroids but doesn't remember the dose as well as antibiotics but doesn't remember the name.    Did not get better so yesterday went to [facility] and they gave her another round of steroids yesterday (2 a day).      Has been having rib pain    Review of Systems:   Review of Systems    Past Medical History:   Past Medical History:   Diagnosis Date    Anxiety and depression     Anxiety state     Benign essential hypertension     Chronic obstructive lung disease     COPD (chronic obstructive pulmonary disease)     Depressive disorder     Gastroesophageal reflux disease     Migraines     Psoriasis     TMJ (dislocation of temporomandibular joint)     Tobacco abuse        Past Surgical History:   Past Surgical History:   Procedure Laterality Date     SECTION      X3       Family History:   Family History   Problem Relation Age of Onset    Hypertension Mother     Hypertension Father     Dementia Father     Colon cancer Maternal Grandmother     Coronary artery disease Paternal Grandmother     Diabetes Paternal Grandmother     Ovarian cancer Paternal Aunt     Hypertension Other     Diabetes Other     Breast cancer Neg Hx        Social History:   Social History     Socioeconomic History    Marital status:    Tobacco Use    Smoking status: Every Day     Packs/day: 0.25     Years: 41.00     Additional pack years: 0.00     Total pack years: 10.25     Types: Cigarettes     Start date: 4/15/1981    Smokeless tobacco: Never   Vaping Use    Vaping Use: Never used   Substance and Sexual Activity    Alcohol use: Never    Drug use: Never    Sexual activity: Defer        Immunizations:   Immunization History   Administered Date(s) Administered    COVID-19 (PFIZER) Purple Cap Monovalent 08/31/2021, 10/11/2021    Flu Vaccine Quad PF >36MO 12/09/2019, 10/25/2021    Fluzone Quad >6mos (Multi-dose) 11/12/2020    Influenza, Unspecified 11/08/2022    Pneumococcal Conjugate 13-Valent (PCV13) 12/05/2017    Pneumococcal Polysaccharide (PPSV23) 07/09/2019    Tdap 11/25/2014        Medications:     Current Outpatient Medications:     albuterol (PROVENTIL) (2.5 MG/3ML) 0.083% nebulizer solution, USE 1 VIAL VIA NEBULIZER EVERY 4 HOURS AS NEEDED FOR WHEEZING, Disp: 360 mL, Rfl: 1    albuterol (PROVENTIL) 4 MG tablet, Take 2 tablets by mouth 2 (Two) Times a Day., Disp: , Rfl:     albuterol sulfate  (90 Base) MCG/ACT inhaler, Inhale 2 puffs Every 4 (Four) Hours As Needed for Wheezing., Disp: 8.5 g, Rfl: 1    cefdinir (OMNICEF) 300 MG capsule, TAKE ONE CAPSULE BY MOUTH EVERY 12 HOURS F 10 DAYS, Disp: , Rfl:     fluticasone (FLONASE) 50 MCG/ACT nasal spray, INSTIL 2 SPRAYS IN EACH NOSTRIL EVERY DAY FOR 7 DAYS, Disp: , Rfl:     guaiFENesin (MUCINEX) 600 MG 12 hr tablet, Take 1 tablet by mouth Every 12 (Twelve) Hours., Disp: , Rfl:     hydrOXYzine (ATARAX) 10 MG tablet, Take one tablet by oral route every 6 hrs, as needed for anxiety, Disp: 20 tablet, Rfl: 0    lisinopril (PRINIVIL,ZESTRIL) 20 MG tablet, Take 1 tablet by mouth Daily., Disp: 90 tablet, Rfl: 3    loratadine (Claritin) 10 MG tablet, Take 1 tablet by mouth Daily., Disp: 90 tablet, Rfl: 3    LORazepam (Ativan) 0.5 MG tablet, Take 1 tablet by mouth Every 8 (Eight) Hours As Needed for Anxiety., Disp: 30 tablet, Rfl: 1    pantoprazole (PROTONIX) 40 MG EC tablet, Take 1 tablet by mouth 2 (Two) Times a Day., Disp: 180 tablet, Rfl: 3    predniSONE (DELTASONE) 20 MG tablet, TAKE 2 TABLETS BY MOUTH DAILY ON DAYS 1-3 AND 1 TABLET DAILY ON DAYS 4-6, Disp: , Rfl:     rosuvastatin (CRESTOR) 20 MG tablet, Take 1 tablet by mouth Daily., Disp:  "90 tablet, Rfl: 3    Spiriva Respimat 2.5 MCG/ACT aerosol solution inhaler, INHALE 2 PUFFS BY MOUTH DAILY, Disp: 4 g, Rfl: 3    Symbicort 160-4.5 MCG/ACT inhaler, Inhale 2 puffs 2 (Two) Times a Day., Disp: , Rfl:     doxycycline (VIBRAMYCIN) 100 MG capsule, Take 1 capsule by mouth Every 12 (Twelve) Hours. (Patient not taking: Reported on 10/23/2023), Disp: , Rfl:     metoprolol succinate XL (TOPROL-XL) 50 MG 24 hr tablet, Take 1 tablet by mouth Daily., Disp: 90 tablet, Rfl: 3    Metoprolol-HCTZ ER 50-12.5 MG tablet sustained-release 24 hour, Take 1 tablet every day by oral route., Disp: , Rfl:     Allergies:   Allergies   Allergen Reactions    Latex Unknown - High Severity    Sulfa Antibiotics Hives    Sulfamethoxazole Rash    Trimethoprim Rash       Objective     Vital Signs  /82   Pulse 106   Temp 97.9 °F (36.6 °C)   Ht 144.8 cm (57\")   Wt 67.1 kg (148 lb)   SpO2 97%   BMI 32.03 kg/m²   Estimated body mass index is 32.03 kg/m² as calculated from the following:    Height as of this encounter: 144.8 cm (57\").    Weight as of this encounter: 67.1 kg (148 lb).            Physical Exam  Vitals and nursing note reviewed.   Constitutional:       General: She is not in acute distress.     Appearance: Normal appearance.   Cardiovascular:      Rate and Rhythm: Normal rate and regular rhythm.      Heart sounds: Normal heart sounds. No murmur heard.     No friction rub. No gallop.   Pulmonary:      Effort: Pulmonary effort is normal.      Breath sounds: No stridor. Wheezing (bilateral upper and lower lobes) present. No rales.   Neurological:      Mental Status: She is alert.            Procedures     Assessment and Plan     1. COPD with acute exacerbation    INCREASE - predniSONE (DELTASONE) 20 MG tablet; Take 3 tablets x 3 days then 2 tablets x 3 days then 1 tablet x 3 days.  Dispense: 18 tablet; Refill: 0  - methylPREDNISolone sodium succinate (SOLU-Medrol) injection 40 mg       Follow Up  Return for must " followup with Dr Stone in 2-3 months due to controlles substances.    Patient was advised to call the office or seek medical care if  any issues discussed during this visit worsen or persist or if new concerns arise        MD [facility]  [address] [facility] [address] [phone]

## 2023-10-25 ENCOUNTER — TELEPHONE (OUTPATIENT)
Dept: FAMILY MEDICINE CLINIC | Facility: CLINIC | Age: 63
End: 2023-10-25

## 2023-10-25 DIAGNOSIS — J44.1 COPD WITH ACUTE EXACERBATION: Primary | ICD-10-CM

## 2023-10-25 NOTE — TELEPHONE ENCOUNTER
Please let patient know I have placed the order but jayashree is out til next week so she should go to the [facility] office instead

## 2023-10-25 NOTE — TELEPHONE ENCOUNTER
Caller: Skye Barlow    Relationship: Self    Best call back number: [phone]     What orders are you requesting (i.e. lab or imaging): XRAY    In what timeframe would the patient need to come in: ASAP    Where will you receive your lab/imaging services: WHERE RECOMMENDED     Additional notes: PATIENT SEE DR JOSSUE CHAVEZ 10/23/23 AND WAS TOLD IF SHE WASN'T BETTER BY WEDNESDAY SHE WOULD NEED AN XRAY. PATIENT IS NOT BETTER AND IS REQUESTING AN ORDER FOR AN XRAY ASAP. PLEASE ADVISE AND CALL PATIENT

## 2023-10-27 ENCOUNTER — TELEPHONE (OUTPATIENT)
Dept: FAMILY MEDICINE CLINIC | Facility: CLINIC | Age: 63
End: 2023-10-27

## 2023-10-31 ENCOUNTER — OFFICE VISIT (OUTPATIENT)
Dept: FAMILY MEDICINE CLINIC | Facility: CLINIC | Age: 63
End: 2023-10-31
Payer: MEDICARE

## 2023-10-31 VITALS
WEIGHT: 146 LBS | HEIGHT: 57 IN | OXYGEN SATURATION: 95 % | DIASTOLIC BLOOD PRESSURE: 80 MMHG | BODY MASS INDEX: 31.5 KG/M2 | HEART RATE: 70 BPM | SYSTOLIC BLOOD PRESSURE: 132 MMHG

## 2023-10-31 DIAGNOSIS — F06.4 ANXIETY DISORDER DUE TO GENERAL MEDICAL CONDITION: ICD-10-CM

## 2023-10-31 RX ORDER — LORAZEPAM 0.5 MG/1
0.5 TABLET ORAL EVERY 8 HOURS PRN
Qty: 30 TABLET | Refills: 1 | Status: SHIPPED | OUTPATIENT
Start: 2023-10-31

## 2023-10-31 NOTE — PROGRESS NOTES
Office Note     Name: Skye Barlow    : 1960     MRN: [id number]     Chief Complaint  Chest Pain (Rib and chest pain. Not as bad as it was. Pt states it could of been constipation. Or copd)    Subjective     History of Present Illness:  Skye Barlow is a 63 y.o. female who presents today for the loss of her sister age 62 [place].  She helped out with the  meals and stayed with a watch couple movies.  Chest and rib pain much better    Review of Systems:   Review of Systems    Past Medical History:   Past Medical History:   Diagnosis Date    Anxiety and depression     Anxiety state     Benign essential hypertension     Chronic obstructive lung disease     COPD (chronic obstructive pulmonary disease)     Depressive disorder     Gastroesophageal reflux disease     Migraines     Psoriasis     TMJ (dislocation of temporomandibular joint)     Tobacco abuse        Past Surgical History:   Past Surgical History:   Procedure Laterality Date     SECTION      X3       Family History:   Family History   Problem Relation Age of Onset    Hypertension Mother     Hypertension Father     Dementia Father     Colon cancer Maternal Grandmother     Coronary artery disease Paternal Grandmother     Diabetes Paternal Grandmother     Ovarian cancer Paternal Aunt     Hypertension Other     Diabetes Other     Breast cancer Neg Hx        Social History:   Social History     Socioeconomic History    Marital status:    Tobacco Use    Smoking status: Every Day     Packs/day: 0.25     Years: 41.00     Additional pack years: 0.00     Total pack years: 10.25     Types: Cigarettes     Start date: 4/15/1981     Passive exposure: Current    Smokeless tobacco: Never   Vaping Use    Vaping Use: Never used   Substance and Sexual Activity    Alcohol use: Never    Drug use: Never    Sexual activity: Defer       Immunizations:   Immunization History   Administered Date(s) Administered    COVID-19  (PFIZER) Purple Cap Monovalent 08/31/2021, 10/11/2021    Flu Vaccine Quad PF >36MO 12/09/2019, 10/25/2021    Fluzone Quad >6mos (Multi-dose) 11/12/2020    Influenza, Unspecified 11/08/2022    Pneumococcal Conjugate 13-Valent (PCV13) 12/05/2017    Pneumococcal Polysaccharide (PPSV23) 07/09/2019    Tdap 11/25/2014        Medications:     Current Outpatient Medications:     albuterol (PROVENTIL) (2.5 MG/3ML) 0.083% nebulizer solution, USE 1 VIAL VIA NEBULIZER EVERY 4 HOURS AS NEEDED FOR WHEEZING, Disp: 360 mL, Rfl: 1    albuterol (PROVENTIL) 4 MG tablet, Take 2 tablets by mouth 2 (Two) Times a Day., Disp: , Rfl:     albuterol sulfate  (90 Base) MCG/ACT inhaler, Inhale 2 puffs Every 4 (Four) Hours As Needed for Wheezing., Disp: 8.5 g, Rfl: 1    fluticasone (FLONASE) 50 MCG/ACT nasal spray, INSTIL 2 SPRAYS IN EACH NOSTRIL EVERY DAY FOR 7 DAYS, Disp: , Rfl:     guaiFENesin (MUCINEX) 600 MG 12 hr tablet, Take 1 tablet by mouth Every 12 (Twelve) Hours., Disp: , Rfl:     hydrOXYzine (ATARAX) 10 MG tablet, Take one tablet by oral route every 6 hrs, as needed for anxiety, Disp: 20 tablet, Rfl: 0    lisinopril (PRINIVIL,ZESTRIL) 20 MG tablet, Take 1 tablet by mouth Daily., Disp: 90 tablet, Rfl: 3    loratadine (Claritin) 10 MG tablet, Take 1 tablet by mouth Daily., Disp: 90 tablet, Rfl: 3    LORazepam (Ativan) 0.5 MG tablet, Take 1 tablet by mouth Every 8 (Eight) Hours As Needed for Anxiety., Disp: 30 tablet, Rfl: 1    pantoprazole (PROTONIX) 40 MG EC tablet, Take 1 tablet by mouth 2 (Two) Times a Day., Disp: 180 tablet, Rfl: 3    rosuvastatin (CRESTOR) 20 MG tablet, Take 1 tablet by mouth Daily., Disp: 90 tablet, Rfl: 3    Spiriva Respimat 2.5 MCG/ACT aerosol solution inhaler, INHALE 2 PUFFS BY MOUTH DAILY, Disp: 4 g, Rfl: 3    Symbicort 160-4.5 MCG/ACT inhaler, Inhale 2 puffs 2 (Two) Times a Day., Disp: , Rfl:     Allergies:   Allergies   Allergen Reactions    Latex Unknown - High Severity    Sulfa Antibiotics Hives     "Sulfamethoxazole Rash    Trimethoprim Rash       Objective     Vital Signs  /80   Pulse 70   Ht 144.8 cm (57\")   Wt 66.2 kg (146 lb)   SpO2 95%   BMI 31.59 kg/m²   Estimated body mass index is 31.59 kg/m² as calculated from the following:    Height as of this encounter: 144.8 cm (57\").    Weight as of this encounter: 66.2 kg (146 lb).          Physical Exam  Constitutional:       General: She is not in acute distress.     Appearance: Normal appearance. She is obese. She is not toxic-appearing or diaphoretic.   HENT:      Head: Normocephalic and atraumatic.      Right Ear: External ear normal.      Left Ear: External ear normal.      Nose: Nose normal.   Eyes:      Pupils: Pupils are equal, round, and reactive to light.   Pulmonary:      Effort: Pulmonary effort is normal.      Breath sounds: Wheezing and rhonchi present.   Skin:     General: Skin is warm and dry.   Neurological:      Mental Status: She is alert.   Psychiatric:         Mood and Affect: Mood normal.         Behavior: Behavior normal.          Procedures     Assessment and Plan     1. Anxiety disorder due to general medical condition  I want to see her in couple months  - LORazepam (Ativan) 0.5 MG tablet; Take 1 tablet by mouth Every 8 (Eight) Hours As Needed for Anxiety.  Dispense: 30 tablet; Refill: 1       Follow Up  No follow-ups on file.    Rod [facility]  [address] [place] [id number] [phone]  "

## 2023-11-03 ENCOUNTER — TELEPHONE (OUTPATIENT)
Dept: FAMILY MEDICINE CLINIC | Facility: CLINIC | Age: 63
End: 2023-11-03

## 2023-11-03 NOTE — TELEPHONE ENCOUNTER
Caller: RON DE LA CRUZ    Relationship:[facility] (NO BH VERBAL WITH OFFICE)    Callback number: [phone]    Is it ok to leave a message: [x] Yes [] No    Requested medication for samples: SYMBICORT    How much medication does the patient currently have left: NONE    Who will be picking up the samples: [facility]    Do you need information about patient financial assistance for this medication: [] Yes [] No    Additional details provided: MR. DE LA CRUZ STATED HIS WIFE RAN OUT OF HER SYMBICORT YESTERDAY AND THE PHARMACY MAY GET IT IN STOCK TODAY.    REQUESTING SAMPLES IN THE MEANTIME.

## 2023-11-08 ENCOUNTER — OFFICE VISIT (OUTPATIENT)
Dept: FAMILY MEDICINE CLINIC | Facility: CLINIC | Age: 63
End: 2023-11-08
Payer: MEDICARE

## 2023-11-08 VITALS
SYSTOLIC BLOOD PRESSURE: 120 MMHG | OXYGEN SATURATION: 94 % | WEIGHT: 140 LBS | BODY MASS INDEX: 30.2 KG/M2 | HEART RATE: 96 BPM | DIASTOLIC BLOOD PRESSURE: 78 MMHG | HEIGHT: 57 IN

## 2023-11-08 DIAGNOSIS — F17.200 SMOKER: ICD-10-CM

## 2023-11-08 DIAGNOSIS — J43.2 CENTRILOBULAR EMPHYSEMA: ICD-10-CM

## 2023-11-08 DIAGNOSIS — R10.11 RUQ PAIN: Primary | ICD-10-CM

## 2023-11-08 DIAGNOSIS — R06.02 SOB (SHORTNESS OF BREATH): ICD-10-CM

## 2023-11-08 PROBLEM — E66.3 OVERWEIGHT WITH BODY MASS INDEX (BMI) 25.0-29.9: Status: ACTIVE | Noted: 2021-12-15

## 2023-11-08 PROBLEM — R31.29 MICROSCOPIC HEMATURIA: Status: ACTIVE | Noted: 2021-09-13

## 2023-11-08 PROBLEM — R33.9 INCOMPLETE EMPTYING OF BLADDER: Status: ACTIVE | Noted: 2021-09-13

## 2023-11-08 PROBLEM — R35.0 INCREASED FREQUENCY OF URINATION: Status: ACTIVE | Noted: 2021-09-13

## 2023-11-08 PROCEDURE — 3078F DIAST BP <80 MM HG: CPT | Performed by: FAMILY MEDICINE

## 2023-11-08 PROCEDURE — 99214 OFFICE O/P EST MOD 30 MIN: CPT | Performed by: FAMILY MEDICINE

## 2023-11-08 PROCEDURE — 1159F MED LIST DOCD IN RCRD: CPT | Performed by: FAMILY MEDICINE

## 2023-11-08 PROCEDURE — 1160F RVW MEDS BY RX/DR IN RCRD: CPT | Performed by: FAMILY MEDICINE

## 2023-11-08 PROCEDURE — 3074F SYST BP LT 130 MM HG: CPT | Performed by: FAMILY MEDICINE

## 2023-11-08 RX ORDER — PREDNISONE 20 MG/1
TABLET ORAL
Qty: 34 TABLET | Refills: 0 | Status: SHIPPED | OUTPATIENT
Start: 2023-11-08

## 2023-11-08 NOTE — PROGRESS NOTES
Office Note     Name: Skye Barlow    : 1960     MRN: [id number]     Chief Complaint  Abdominal Pain (x7days)    Subjective     History of Present Illness:  Skye Barlow is a 63 y.o. female who presents today for who has been short of breath for over a month.  She went to the [facility] urgent treatment center they gave her steroids she returned they gave her shot of steroids then to [facility] in the received a shot got mixed up on the prednisone pills.  Can half a pack a day    Right upper quadrant pain x7 days 30 minutes after meal    Review of Systems:   Review of Systems    Past Medical History:   Past Medical History:   Diagnosis Date    Anxiety and depression     Anxiety state     Benign essential hypertension     Chronic obstructive lung disease     COPD (chronic obstructive pulmonary disease)     Depressive disorder     Gastroesophageal reflux disease     Migraines     Psoriasis     TMJ (dislocation of temporomandibular joint)     Tobacco abuse        Past Surgical History:   Past Surgical History:   Procedure Laterality Date     SECTION      X3       Family History:   Family History   Problem Relation Age of Onset    Hypertension Mother     Hypertension Father     Dementia Father     Colon cancer Maternal Grandmother     Coronary artery disease Paternal Grandmother     Diabetes Paternal Grandmother     Ovarian cancer Paternal Aunt     Hypertension Other     Diabetes Other     Breast cancer Neg Hx        Social History:   Social History     Socioeconomic History    Marital status:    Tobacco Use    Smoking status: Every Day     Packs/day: 0.25     Years: 41.00     Additional pack years: 0.00     Total pack years: 10.25     Types: Cigarettes     Start date: 4/15/1981     Passive exposure: Current    Smokeless tobacco: Never   Vaping Use    Vaping Use: Never used   Substance and Sexual Activity    Alcohol use: Never    Drug use: Never    Sexual activity: Defer        Immunizations:   Immunization History   Administered Date(s) Administered    COVID-19 (PFIZER) Purple Cap Monovalent 08/31/2021, 10/11/2021    Flu Vaccine Quad PF >36MO 12/09/2019, 10/25/2021    Fluzone (or Fluarix & Flulaval for VFC) >6mos 12/09/2019, 10/25/2021    Fluzone Quad >6mos (Multi-dose) 11/12/2020    Influenza Injectable Mdck Pf Quad 11/08/2022, 10/31/2023    Influenza, Unspecified 11/08/2022    Pneumococcal Conjugate 13-Valent (PCV13) 12/05/2017    Pneumococcal Polysaccharide (PPSV23) 07/09/2019    Tdap 11/25/2014        Medications:     Current Outpatient Medications:     albuterol (PROVENTIL) (2.5 MG/3ML) 0.083% nebulizer solution, USE 1 VIAL VIA NEBULIZER EVERY 4 HOURS AS NEEDED FOR WHEEZING, Disp: 360 mL, Rfl: 1    albuterol (PROVENTIL) 4 MG tablet, Take 2 tablets by mouth 2 (Two) Times a Day., Disp: , Rfl:     albuterol sulfate  (90 Base) MCG/ACT inhaler, Inhale 2 puffs Every 4 (Four) Hours As Needed for Wheezing., Disp: 8.5 g, Rfl: 1    fluticasone (FLONASE) 50 MCG/ACT nasal spray, INSTIL 2 SPRAYS IN EACH NOSTRIL EVERY DAY FOR 7 DAYS, Disp: , Rfl:     guaiFENesin (MUCINEX) 600 MG 12 hr tablet, Take 1 tablet by mouth Every 12 (Twelve) Hours., Disp: , Rfl:     hydrOXYzine (ATARAX) 10 MG tablet, Take one tablet by oral route every 6 hrs, as needed for anxiety, Disp: 20 tablet, Rfl: 0    lisinopril (PRINIVIL,ZESTRIL) 20 MG tablet, Take 1 tablet by mouth Daily., Disp: 90 tablet, Rfl: 3    loratadine (Claritin) 10 MG tablet, Take 1 tablet by mouth Daily., Disp: 90 tablet, Rfl: 3    LORazepam (Ativan) 0.5 MG tablet, Take 1 tablet by mouth Every 8 (Eight) Hours As Needed for Anxiety., Disp: 30 tablet, Rfl: 1    pantoprazole (PROTONIX) 40 MG EC tablet, Take 1 tablet by mouth 2 (Two) Times a Day., Disp: 180 tablet, Rfl: 3    rosuvastatin (CRESTOR) 20 MG tablet, Take 1 tablet by mouth Daily., Disp: 90 tablet, Rfl: 3    Spiriva Respimat 2.5 MCG/ACT aerosol solution inhaler, INHALE 2 PUFFS BY MOUTH  "DAILY, Disp: 4 g, Rfl: 3    Symbicort 160-4.5 MCG/ACT inhaler, Inhale 2 puffs 2 (Two) Times a Day., Disp: , Rfl:     predniSONE (DELTASONE) 20 MG tablet, 3poqd x 5d, 2po qdx 5d, 1poqd x 5d, 0.5 qd x 5 days, 0.5 qod till gone., Disp: 34 tablet, Rfl: 0    Allergies:   Allergies   Allergen Reactions    Latex Unknown - High Severity    Sulfa Antibiotics Hives    Sulfamethoxazole Rash    Trimethoprim Rash       Objective     Vital Signs  /78   Pulse 96   Ht 144.8 cm (57\")   Wt 63.5 kg (140 lb)   SpO2 94%   BMI 30.30 kg/m²   Estimated body mass index is 30.3 kg/m² as calculated from the following:    Height as of this encounter: 144.8 cm (57\").    Weight as of this encounter: 63.5 kg (140 lb).          Physical Exam  Vitals and nursing note reviewed.   Constitutional:       Appearance: Normal appearance. She is normal weight.   HENT:      Head: Normocephalic.      Right Ear: External ear normal.      Left Ear: External ear normal.      Nose: Nose normal.   Eyes:      Pupils: Pupils are equal, round, and reactive to light.   Neck:      Vascular: No carotid bruit.   Cardiovascular:      Rate and Rhythm: Normal rate and regular rhythm.      Pulses: Normal pulses.      Heart sounds: Normal heart sounds.   Pulmonary:      Effort: Pulmonary effort is normal.      Breath sounds: Wheezing and rhonchi present.   Musculoskeletal:      Cervical back: Normal range of motion and neck supple.   Skin:     General: Skin is warm and dry.   Neurological:      Mental Status: She is alert.   Psychiatric:         Mood and Affect: Mood normal.          Procedures     Assessment and Plan     1. RUQ pain    - US Gallbladder; Future    2. Centrilobular emphysema      3. SOB (shortness of breath)  She is going to take a month of prednisone pills and gradually tapering dose.  Dr. Haas was informed that she had not towards prednisone.  Take the lorazepam if she gets too nervous       Follow Up  Return if symptoms worsen or fail to " improve.    Rod CLARKE [facility]  [address] [address] [phone]

## 2023-11-09 ENCOUNTER — TELEPHONE (OUTPATIENT)
Dept: FAMILY MEDICINE CLINIC | Facility: CLINIC | Age: 63
End: 2023-11-09

## 2023-11-09 NOTE — TELEPHONE ENCOUNTER
Caller: KAYLA DE LA CRUZ    Relationship: Emergency Contact    Best call back number: [phone]    What orders are you requesting (i.e. lab or imaging): CT SCAN OF LUNGS      Additional notes: THE PATIENTS DAUGHTER STATES THAT THE PATIENT WOULD LIKE TO GET A CT SCAN OF HER LUNGS  THEY HAVE NOT RECEIVED A CALL TO SCHEDULE THE ULTRASOUND

## 2023-11-10 ENCOUNTER — TELEPHONE (OUTPATIENT)
Dept: FAMILY MEDICINE CLINIC | Facility: CLINIC | Age: 63
End: 2023-11-10
Payer: MEDICARE

## 2023-11-10 DIAGNOSIS — J43.2 CENTRILOBULAR EMPHYSEMA: Primary | ICD-10-CM

## 2023-11-10 RX ORDER — ALBUTEROL SULFATE 90 UG/1
2 AEROSOL, METERED RESPIRATORY (INHALATION) EVERY 4 HOURS PRN
Qty: 8.5 G | Refills: 1 | Status: SHIPPED | OUTPATIENT
Start: 2023-11-10

## 2023-11-10 NOTE — TELEPHONE ENCOUNTER
Meenakshi called requesting  send in the patient an albuterol sulfate inhaler.    Please advise thank you!

## 2023-11-14 DIAGNOSIS — J45.50 SEVERE PERSISTENT ASTHMA, UNSPECIFIED WHETHER COMPLICATED: Primary | ICD-10-CM

## 2023-11-20 ENCOUNTER — OUTSIDE FACILITY SERVICE (OUTPATIENT)
Dept: CARDIOLOGY | Facility: CLINIC | Age: 63
End: 2023-11-20
Payer: MEDICARE

## 2023-11-20 PROCEDURE — 99222 1ST HOSP IP/OBS MODERATE 55: CPT | Performed by: INTERNAL MEDICINE

## 2023-11-22 RX ORDER — TIOTROPIUM BROMIDE INHALATION SPRAY 3.12 UG/1
SPRAY, METERED RESPIRATORY (INHALATION)
Qty: 4 G | Refills: 1 | Status: SHIPPED | OUTPATIENT
Start: 2023-11-22

## 2023-12-05 DIAGNOSIS — J43.2 CENTRILOBULAR EMPHYSEMA: ICD-10-CM

## 2023-12-05 RX ORDER — ALBUTEROL SULFATE 2.5 MG/3ML
SOLUTION RESPIRATORY (INHALATION)
Qty: 360 ML | Refills: 1 | Status: SHIPPED | OUTPATIENT
Start: 2023-12-05

## 2023-12-12 ENCOUNTER — OFFICE VISIT (OUTPATIENT)
Dept: FAMILY MEDICINE CLINIC | Facility: CLINIC | Age: 63
End: 2023-12-12
Payer: MEDICARE

## 2023-12-12 VITALS
WEIGHT: 144 LBS | HEIGHT: 57 IN | SYSTOLIC BLOOD PRESSURE: 140 MMHG | BODY MASS INDEX: 31.07 KG/M2 | DIASTOLIC BLOOD PRESSURE: 82 MMHG | OXYGEN SATURATION: 96 % | HEART RATE: 98 BPM

## 2023-12-12 DIAGNOSIS — E66.3 OVERWEIGHT WITH BODY MASS INDEX (BMI) 25.0-29.9: ICD-10-CM

## 2023-12-12 DIAGNOSIS — R35.0 INCREASED FREQUENCY OF URINATION: ICD-10-CM

## 2023-12-12 DIAGNOSIS — I10 ESSENTIAL HYPERTENSION: Primary | ICD-10-CM

## 2023-12-12 DIAGNOSIS — J43.2 CENTRILOBULAR EMPHYSEMA: ICD-10-CM

## 2023-12-12 DIAGNOSIS — Z72.0 TOBACCO USE: ICD-10-CM

## 2023-12-12 DIAGNOSIS — E78.5 HYPERLIPIDEMIA LDL GOAL <100: ICD-10-CM

## 2023-12-12 RX ORDER — BUPROPION HYDROCHLORIDE 150 MG/1
150 TABLET ORAL DAILY
Qty: 30 TABLET | Refills: 5 | Status: SHIPPED | OUTPATIENT
Start: 2023-12-12

## 2023-12-12 NOTE — PROGRESS NOTES
The ABCs of the Annual Wellness Visit  Initial Medicare Wellness Visit    Subjective     Skye Barlow is a 63 y.o. female who presents for an Initial Medicare Wellness Visit.    The following portions of the patient's history were reviewed and   updated as appropriate: allergies, current medications, past family history, past medical history, past social history, past surgical history, and problem list.     Compared to one year ago, the patient feels her physical   health is worse.    Compared to one year ago, the patient feels her mental   health is worse.    Recent Hospitalizations:  She was [facility] within the [facility].       Current Medical Providers:  Patient Care Team:  Rod Stone MD as PCP - General (Family Medicine)  Andre Burt MD as Consulting Physician (Cardiology)    Outpatient Medications Prior to Visit   Medication Sig Dispense Refill    albuterol (PROVENTIL) (2.5 MG/3ML) 0.083% nebulizer solution USE 1 VIAL VIA NEBULIZER EVERY 4 HOURS AS NEEDED FOR WHEEZING 360 mL 1    albuterol (PROVENTIL) 4 MG tablet Take 2 tablets by mouth 2 (Two) Times a Day.      albuterol sulfate  (90 Base) MCG/ACT inhaler Inhale 2 puffs Every 4 (Four) Hours As Needed for Wheezing. 8.5 g 1    fluticasone (FLONASE) 50 MCG/ACT nasal spray INSTIL 2 SPRAYS IN EACH NOSTRIL EVERY DAY FOR 7 DAYS      guaiFENesin (MUCINEX) 600 MG 12 hr tablet Take 1 tablet by mouth Every 12 (Twelve) Hours.      hydrOXYzine (ATARAX) 10 MG tablet Take one tablet by oral route every 6 hrs, as needed for anxiety 20 tablet 0    loratadine (Claritin) 10 MG tablet Take 1 tablet by mouth Daily. 90 tablet 3    LORazepam (Ativan) 0.5 MG tablet Take 1 tablet by mouth Every 8 (Eight) Hours As Needed for Anxiety. 30 tablet 1    pantoprazole (PROTONIX) 40 MG EC tablet Take 1 tablet by mouth 2 (Two) Times a Day. 180 tablet 3    rosuvastatin (CRESTOR) 20 MG tablet Take 1 tablet by mouth Daily. 90 tablet 3  "   Symbicort 160-4.5 MCG/ACT inhaler Inhale 2 puffs 2 (Two) Times a Day.      tiotropium bromide monohydrate (Spiriva Respimat) 2.5 MCG/ACT aerosol solution inhaler INHALE 2 PUFFS BY MOUTH DAILY 4 g 1    predniSONE (DELTASONE) 20 MG tablet 3poqd x 5d, 2po qdx 5d, 1poqd x 5d, 0.5 qd x 5 days, 0.5 qod till gone. 34 tablet 0    lisinopril (PRINIVIL,ZESTRIL) 20 MG tablet Take 1 tablet by mouth Daily. (Patient not taking: Reported on 12/12/2023) 90 tablet 3     No facility-administered medications prior to visit.       No opioid medication identified on active medication list. I have reviewed chart for other potential  high risk medication/s and harmful drug interactions in the elderly.        Aspirin is not on active medication list.  Aspirin use is not indicated based on review of current medical condition/s. Risk of harm outweighs potential benefits.  .    Patient Active Problem List   Diagnosis    Essential hypertension    Hyperlipidemia LDL goal <100    Tobacco use    COPD (chronic obstructive pulmonary disease)    Acute URI    Overweight with body mass index (BMI) 25.0-29.9    Microscopic hematuria    Increased frequency of urination    Incomplete emptying of bladder     Advance Care Planning   Advance Care Planning     Advance Directive is not on file.  ACP discussion was held with the patient during this visit. Patient does not have an advance directive, declines further assistance.       Objective    Vitals:    12/12/23 1543   BP: 140/82   Pulse: 98   SpO2: 96%  Comment: room air   Weight: 65.3 kg (144 lb)   Height: 144.8 cm (57\")   PainSc: 0-No pain     Estimated body mass index is 31.16 kg/m² as calculated from the following:    Height as of this encounter: 144.8 cm (57\").    Weight as of this encounter: 65.3 kg (144 lb).    BMI is >= 30 and <35. (Class 1 Obesity). The following options were offered after discussion;: exercise counseling/recommendations and nutrition counseling/recommendations      Does the " patient have evidence of cognitive impairment?   No          HEALTH RISK ASSESSMENT    Smoking Status:  Social History     Tobacco Use   Smoking Status Every Day    Packs/day: 0.25    Years: 41.00    Additional pack years: 0.00    Total pack years: 10.25    Types: Cigarettes    Start date: 4/15/1981    Passive exposure: Current   Smokeless Tobacco Never     Alcohol Consumption:  Social History     Substance and Sexual Activity   Alcohol Use Never     Fall Risk Screen:    STEADI Fall Risk Assessment was completed, and patient is at LOW risk for falls.Assessment completed on:2023    Depression Screen:       2023     3:39 PM   PHQ-2/PHQ-9 Depression Screening   Little Interest or Pleasure in Doing Things 0-->not at all   Feeling Down, Depressed or Hopeless 0-->not at all   PHQ-9: Brief Depression Severity Measure Score 0       Health Habits and Functional and Cognitive Screenin/12/2023     3:41 PM   Functional & Cognitive Status   Do you have difficulty concentrating, remembering or making decisions? Yes       Age-appropriate Screening Schedule:  Refer to the list below for future screening recommendations based on patient's age, sex and/or medical conditions. Orders for these recommended tests are listed in the plan section. The patient has been provided with a written plan.    Health Maintenance   Topic Date Due    PAP SMEAR  Never done    ZOSTER VACCINE (1 of 2) 2023 (Originally 2010)    COVID-19 Vaccine (3 - - season) 2023 (Originally 2023)    HEPATITIS C SCREENING  2024 (Originally 2021)    LIPID PANEL  2024    MAMMOGRAM  2024    TDAP/TD VACCINES (2 - Td or Tdap) 2024    ANNUAL WELLNESS VISIT  2024    BMI FOLLOWUP  2024    Pneumococcal Vaccine 0-64 (3 - PPSV23 or PCV20) 2025    COLORECTAL CANCER SCREENING  2032    INFLUENZA VACCINE  Completed          CMS Preventative Services Quick Reference  Risk Factors  "Identified During Encounter    None Identified    The above risks/problems have been discussed with the patient.  Pertinent information has been shared with the patient in the After Visit Summary.  An After Visit Summary and PPPS were made available to the patient.  Diagnoses and all orders for this visit:    1. Essential hypertension (Primary)    2. Hyperlipidemia LDL goal <100    3. Overweight with body mass index (BMI) 25.0-29.9    4. Increased frequency of urination    5. Centrilobular emphysema    6. Tobacco use    7.  Smoking--d/c it  Follow Up:  Next Medicare Wellness visit to be scheduled in 1 year.        Additional E&M Note during same encounter follows:  Patient has multiple medical problems which are significant and separately identifiable that require additional work above and beyond the Medicare Wellness Visit.      Chief Complaint  Medicare Wellness-Initial Visit (Physical. Plus follow up from hospital visit)    Subjective        HPI  Skye Barlow is also being seen today for a posthospital check she went in with right upper quadrant pain and shortness of breath pulse ox got down to 84% she ended up CT of the abdomen pelvis and ultrasound of the abdomen pelvis full of stool.  Gallbladder was contracted, did not do a HIDA scan.  It is most imperative that she quit smoking         Objective   Vital Signs:  /82   Pulse 98   Ht 144.8 cm (57\")   Wt 65.3 kg (144 lb)   SpO2 96% Comment: room air  BMI 31.16 kg/m²     Physical Exam  Vitals and nursing note reviewed.   Constitutional:       Appearance: Normal appearance. She is normal weight.   HENT:      Head: Normocephalic.      Right Ear: External ear normal.      Left Ear: External ear normal.      Nose: Nose normal.   Eyes:      Pupils: Pupils are equal, round, and reactive to light.   Neck:      Vascular: No carotid bruit.   Cardiovascular:      Rate and Rhythm: Normal rate and regular rhythm.      Pulses: Normal pulses.      Heart sounds: " Normal heart sounds.   Pulmonary:      Effort: Pulmonary effort is normal.      Breath sounds: Wheezing present. No rhonchi or rales.   Musculoskeletal:      Cervical back: Normal range of motion and neck supple.   Skin:     General: Skin is warm and dry.   Neurological:      Mental Status: She is alert.   Psychiatric:         Mood and Affect: Mood normal.                         Assessment and Plan   Diagnoses and all orders for this visit:    1. Essential hypertension (Primary)    2. Hyperlipidemia LDL goal <100    3. Overweight with body mass index (BMI) 25.0-29.9    4. Increased frequency of urination    5. Centrilobular emphysema    6. Tobacco use             Follow Up   No follow-ups on file.  Patient was given instructions and counseling regarding her condition or for health maintenance advice. Please see specific information pulled into the AVS if appropriate.

## 2023-12-20 ENCOUNTER — TELEPHONE (OUTPATIENT)
Dept: FAMILY MEDICINE CLINIC | Facility: CLINIC | Age: 63
End: 2023-12-20
Payer: MEDICARE

## 2023-12-20 RX ORDER — FAMOTIDINE 40 MG/1
40 TABLET, FILM COATED ORAL DAILY
Qty: 90 TABLET | OUTPATIENT
Start: 2023-12-20

## 2023-12-20 RX ORDER — FAMOTIDINE 40 MG/1
40 TABLET, FILM COATED ORAL DAILY
Qty: 30 TABLET | Refills: 1 | Status: SHIPPED | OUTPATIENT
Start: 2023-12-20

## 2023-12-20 NOTE — TELEPHONE ENCOUNTER
Caller: DANIAL DE LA CRUZA    Relationship: Emergency Contact    Best call back number:[phone]    Requested Prescriptions:   Requested Prescriptions      No prescriptions requested or ordered in this encounter    PEPCID 40 MG     Pharmacy where request should be sent: [id number] [place], [place] - [address] AT [facility] [address] [facility] - [phone] [facility] [phone] FX     Last office visit with prescribing clinician: 12/12/2023   Last telemedicine visit with prescribing clinician: Visit date not found   Next office visit with prescribing clinician: 1/2/2024     Additional details provided by patient: REQUESTING REFILL; PATIENT WOULD LIKE TO HAVE PCP TAKE THIS OVER FROM WHEN SHE WAS IN HOSPITAL    PLEASE ADVISE    Does the patient have less than a 3 day supply:  [x] Yes  [] No    Would you like a call back once the refill request has been completed: [x] Yes [] No    If the office needs to give you a call back, can they leave a voicemail: [x] Yes [] No    Meli Husain Rep   12/20/23 10:18 EST

## 2023-12-20 NOTE — TELEPHONE ENCOUNTER
Pt daughter contacted.  Discontinuing protonix and adding pepcid 40 mg. 60 days. If this does not work pt would like to be referred to GI

## 2024-01-02 ENCOUNTER — OFFICE VISIT (OUTPATIENT)
Dept: FAMILY MEDICINE CLINIC | Facility: CLINIC | Age: 64
End: 2024-01-02
Payer: MEDICARE

## 2024-01-02 VITALS
SYSTOLIC BLOOD PRESSURE: 144 MMHG | DIASTOLIC BLOOD PRESSURE: 90 MMHG | HEART RATE: 92 BPM | WEIGHT: 145 LBS | HEIGHT: 57 IN | BODY MASS INDEX: 31.28 KG/M2 | OXYGEN SATURATION: 94 %

## 2024-01-02 DIAGNOSIS — J43.2 CENTRILOBULAR EMPHYSEMA: ICD-10-CM

## 2024-01-02 DIAGNOSIS — Z72.0 TOBACCO USE: ICD-10-CM

## 2024-01-02 DIAGNOSIS — F32.A ANXIETY AND DEPRESSION: Primary | ICD-10-CM

## 2024-01-02 DIAGNOSIS — F41.9 ANXIETY AND DEPRESSION: Primary | ICD-10-CM

## 2024-01-02 PROCEDURE — 1160F RVW MEDS BY RX/DR IN RCRD: CPT | Performed by: FAMILY MEDICINE

## 2024-01-02 PROCEDURE — 3080F DIAST BP >= 90 MM HG: CPT | Performed by: FAMILY MEDICINE

## 2024-01-02 PROCEDURE — 99214 OFFICE O/P EST MOD 30 MIN: CPT | Performed by: FAMILY MEDICINE

## 2024-01-02 PROCEDURE — 3077F SYST BP >= 140 MM HG: CPT | Performed by: FAMILY MEDICINE

## 2024-01-02 PROCEDURE — 1159F MED LIST DOCD IN RCRD: CPT | Performed by: FAMILY MEDICINE

## 2024-01-02 NOTE — PROGRESS NOTES
Office Note     Name: Skye Barlow    : 1960     MRN: [id number]     Chief Complaint  Anxiety (Follow up)    Subjective     History of Present Illness:  Skye Barlow is a 63 y.o. female who presents today for anxiety, COPD, tobacco abuse.  She is got her 2 to 4 cigarettes a day downtrending last Ativan 0.5 mg taken 1 or 2 a day.  She is afraid of getting addicted to them having no trouble getting onto elevators despite she is claustrophobic.  I want to have a plan her  and the patient been  45 years 2024    Review of Systems:   Review of Systems    Past Medical History:   Past Medical History:   Diagnosis Date    Anxiety and depression 2024    Anxiety state     Benign essential hypertension     Chronic obstructive lung disease     COPD (chronic obstructive pulmonary disease)     Depressive disorder     Gastroesophageal reflux disease     Migraines     Psoriasis     TMJ (dislocation of temporomandibular joint)     Tobacco abuse        Past Surgical History:   Past Surgical History:   Procedure Laterality Date     SECTION      X3       Family History:   Family History   Problem Relation Age of Onset    Hypertension Mother     Hypertension Father     Dementia Father     Colon cancer Maternal Grandmother     Coronary artery disease Paternal Grandmother     Diabetes Paternal Grandmother     Ovarian cancer Paternal Aunt     Hypertension Other     Diabetes Other     Breast cancer Neg Hx        Social History:   Social History     Socioeconomic History    Marital status:    Tobacco Use    Smoking status: Every Day     Packs/day: 0.25     Years: 41.00     Additional pack years: 0.00     Total pack years: 10.25     Types: Cigarettes     Start date: 4/15/1981     Passive exposure: Current    Smokeless tobacco: Never   Vaping Use    Vaping Use: Never used   Substance and Sexual Activity    Alcohol use: Never    Drug use: Never    Sexual activity: Defer        Immunizations:   Immunization History   Administered Date(s) Administered    COVID-19 (PFIZER) Purple Cap Monovalent 08/31/2021, 10/11/2021    Flu Vaccine Quad PF >36MO 12/09/2019, 10/25/2021    Fluzone (or Fluarix & Flulaval for VFC) >6mos 12/09/2019, 10/25/2021    Fluzone Quad >6mos (Multi-dose) 11/12/2020    Influenza Injectable Mdck Pf Quad 11/08/2022, 10/31/2023    Influenza, Unspecified 11/08/2022    Pneumococcal Conjugate 13-Valent (PCV13) 12/05/2017    Pneumococcal Polysaccharide (PPSV23) 07/09/2019    Tdap 11/25/2014        Medications:     Current Outpatient Medications:     albuterol (PROVENTIL) (2.5 MG/3ML) 0.083% nebulizer solution, USE 1 VIAL VIA NEBULIZER EVERY 4 HOURS AS NEEDED FOR WHEEZING, Disp: 360 mL, Rfl: 1    albuterol (PROVENTIL) 4 MG tablet, Take 2 tablets by mouth 2 (Two) Times a Day., Disp: , Rfl:     albuterol sulfate  (90 Base) MCG/ACT inhaler, Inhale 2 puffs Every 4 (Four) Hours As Needed for Wheezing., Disp: 8.5 g, Rfl: 1    buPROPion XL (Wellbutrin XL) 150 MG 24 hr tablet, Take 1 tablet by mouth Daily., Disp: 30 tablet, Rfl: 5    famotidine (Pepcid) 40 MG tablet, Take 1 tablet by mouth Daily., Disp: 30 tablet, Rfl: 1    fluticasone (FLONASE) 50 MCG/ACT nasal spray, INSTIL 2 SPRAYS IN EACH NOSTRIL EVERY DAY FOR 7 DAYS, Disp: , Rfl:     guaiFENesin (MUCINEX) 600 MG 12 hr tablet, Take 1 tablet by mouth Every 12 (Twelve) Hours., Disp: , Rfl:     hydrOXYzine (ATARAX) 10 MG tablet, Take one tablet by oral route every 6 hrs, as needed for anxiety, Disp: 20 tablet, Rfl: 0    lisinopril (PRINIVIL,ZESTRIL) 20 MG tablet, Take 1 tablet by mouth Daily., Disp: 90 tablet, Rfl: 3    loratadine (Claritin) 10 MG tablet, Take 1 tablet by mouth Daily., Disp: 90 tablet, Rfl: 3    LORazepam (Ativan) 0.5 MG tablet, Take 1 tablet by mouth Every 8 (Eight) Hours As Needed for Anxiety., Disp: 30 tablet, Rfl: 1    rosuvastatin (CRESTOR) 20 MG tablet, Take 1 tablet by mouth Daily., Disp: 90 tablet,  "Rfl: 3    Symbicort 160-4.5 MCG/ACT inhaler, Inhale 2 puffs 2 (Two) Times a Day., Disp: , Rfl:     tiotropium bromide monohydrate (Spiriva Respimat) 2.5 MCG/ACT aerosol solution inhaler, INHALE 2 PUFFS BY MOUTH DAILY, Disp: 4 g, Rfl: 1    Allergies:   Allergies   Allergen Reactions    Latex Unknown - High Severity    Sulfa Antibiotics Hives    Sulfamethoxazole Rash    Trimethoprim Rash       Objective     Vital Signs  /90   Pulse 92   Ht 144.8 cm (57\")   Wt 65.8 kg (145 lb)   SpO2 94% Comment: room air  BMI 31.38 kg/m²   Estimated body mass index is 31.38 kg/m² as calculated from the following:    Height as of this encounter: 144.8 cm (57\").    Weight as of this encounter: 65.8 kg (145 lb).            Physical Exam  Vitals and nursing note reviewed.   Constitutional:       Appearance: Normal appearance. She is normal weight.   HENT:      Head: Normocephalic.      Right Ear: External ear normal.      Left Ear: External ear normal.      Nose: Nose normal.   Eyes:      Pupils: Pupils are equal, round, and reactive to light.   Neck:      Vascular: No carotid bruit.   Cardiovascular:      Rate and Rhythm: Normal rate and regular rhythm.      Pulses: Normal pulses.      Heart sounds: Normal heart sounds.   Pulmonary:      Effort: Pulmonary effort is normal.      Breath sounds: Wheezing present.   Musculoskeletal:      Cervical back: Normal range of motion and neck supple.   Skin:     General: Skin is warm and dry.   Neurological:      Mental Status: She is alert.   Psychiatric:         Mood and Affect: Mood normal.          Procedures     Assessment and Plan     1. Anxiety and depression  I much rather get addicted to antianxiety meds then addicted to tobacco smoke    2. Tobacco use  Central day of quitting January 25    3. Centrilobular emphysema         Follow Up  Return in about 3 months (around 4/2/2024).    Rod VIVAR [facility]  [address] [place] [address] " [place] [address] [phone]

## 2024-01-29 RX ORDER — TIOTROPIUM BROMIDE INHALATION SPRAY 3.12 UG/1
SPRAY, METERED RESPIRATORY (INHALATION)
Qty: 4 G | Refills: 1 | Status: SHIPPED | OUTPATIENT
Start: 2024-01-29

## 2024-02-15 ENCOUNTER — TRANSCRIBE ORDERS (OUTPATIENT)
Dept: ADMINISTRATIVE | Facility: HOSPITAL | Age: 64
End: 2024-02-15
Payer: MEDICARE

## 2024-02-15 DIAGNOSIS — Z12.31 VISIT FOR SCREENING MAMMOGRAM: Primary | ICD-10-CM

## 2024-02-16 RX ORDER — FAMOTIDINE 40 MG/1
40 TABLET, FILM COATED ORAL DAILY
Qty: 90 TABLET | Refills: 1 | Status: SHIPPED | OUTPATIENT
Start: 2024-02-16

## 2024-02-19 DIAGNOSIS — F06.4 ANXIETY DISORDER DUE TO GENERAL MEDICAL CONDITION: ICD-10-CM

## 2024-02-19 DIAGNOSIS — J43.2 CENTRILOBULAR EMPHYSEMA: ICD-10-CM

## 2024-02-19 RX ORDER — ALBUTEROL SULFATE 2.5 MG/3ML
SOLUTION RESPIRATORY (INHALATION)
Qty: 360 ML | Refills: 1 | OUTPATIENT
Start: 2024-02-19

## 2024-02-19 RX ORDER — LORAZEPAM 0.5 MG/1
0.5 TABLET ORAL EVERY 8 HOURS PRN
Qty: 30 TABLET | Refills: 1 | Status: SHIPPED | OUTPATIENT
Start: 2024-02-19

## 2024-02-20 ENCOUNTER — TELEPHONE (OUTPATIENT)
Dept: FAMILY MEDICINE CLINIC | Facility: CLINIC | Age: 64
End: 2024-02-20
Payer: MEDICARE

## 2024-02-20 DIAGNOSIS — J43.2 CENTRILOBULAR EMPHYSEMA: ICD-10-CM

## 2024-02-20 RX ORDER — ALBUTEROL SULFATE 2.5 MG/3ML
2.5 SOLUTION RESPIRATORY (INHALATION) 2 TIMES DAILY
Qty: 360 ML | Refills: 0 | Status: SHIPPED | OUTPATIENT
Start: 2024-02-20

## 2024-02-20 NOTE — TELEPHONE ENCOUNTER
Pt needed nebulizer solution sent to pharmacy. Sending in 60 days. I put it as she takes it BID. Pt still has some on hand but she doesn't want to run out.

## 2024-04-02 ENCOUNTER — OFFICE VISIT (OUTPATIENT)
Dept: FAMILY MEDICINE CLINIC | Facility: CLINIC | Age: 64
End: 2024-04-02
Payer: MEDICARE

## 2024-04-02 VITALS
OXYGEN SATURATION: 95 % | BODY MASS INDEX: 33.01 KG/M2 | HEIGHT: 57 IN | DIASTOLIC BLOOD PRESSURE: 78 MMHG | WEIGHT: 153 LBS | SYSTOLIC BLOOD PRESSURE: 122 MMHG | HEART RATE: 95 BPM

## 2024-04-02 DIAGNOSIS — F32.A ANXIETY AND DEPRESSION: Primary | ICD-10-CM

## 2024-04-02 DIAGNOSIS — E78.5 HYPERLIPIDEMIA LDL GOAL <100: ICD-10-CM

## 2024-04-02 DIAGNOSIS — F06.4 ANXIETY DISORDER DUE TO GENERAL MEDICAL CONDITION: ICD-10-CM

## 2024-04-02 DIAGNOSIS — I10 ESSENTIAL HYPERTENSION: ICD-10-CM

## 2024-04-02 DIAGNOSIS — F41.9 ANXIETY AND DEPRESSION: Primary | ICD-10-CM

## 2024-04-02 DIAGNOSIS — Z72.0 TOBACCO USE: ICD-10-CM

## 2024-04-02 PROCEDURE — 1159F MED LIST DOCD IN RCRD: CPT | Performed by: FAMILY MEDICINE

## 2024-04-02 PROCEDURE — 3074F SYST BP LT 130 MM HG: CPT | Performed by: FAMILY MEDICINE

## 2024-04-02 PROCEDURE — 1160F RVW MEDS BY RX/DR IN RCRD: CPT | Performed by: FAMILY MEDICINE

## 2024-04-02 PROCEDURE — 3078F DIAST BP <80 MM HG: CPT | Performed by: FAMILY MEDICINE

## 2024-04-02 PROCEDURE — 99213 OFFICE O/P EST LOW 20 MIN: CPT | Performed by: FAMILY MEDICINE

## 2024-04-02 RX ORDER — METOPROLOL SUCCINATE 50 MG/1
1 TABLET, EXTENDED RELEASE ORAL DAILY
COMMUNITY
Start: 2024-03-07

## 2024-04-02 RX ORDER — IPRATROPIUM BROMIDE AND ALBUTEROL SULFATE 2.5; .5 MG/3ML; MG/3ML
3 SOLUTION RESPIRATORY (INHALATION) 4 TIMES DAILY PRN
COMMUNITY
Start: 2024-02-28

## 2024-04-02 RX ORDER — LORAZEPAM 0.5 MG/1
TABLET ORAL
Qty: 60 TABLET | Refills: 1 | Status: SHIPPED | OUTPATIENT
Start: 2024-04-02

## 2024-04-02 NOTE — PROGRESS NOTES
Office Note     Name: Skye Barlow    : 1960     MRN: [id number]     Chief Complaint  Hypertension (Follow up)    Subjective     History of Present Illness:  Skye Barlow is a 63 y.o. female who presents today for we had a long discussion if she tolerates for the Ativan she will quit smoking and I will hold her to the    Review of Systems:   Review of Systems    Past Medical History:   Past Medical History:   Diagnosis Date    Anxiety and depression 2024    Anxiety state     Benign essential hypertension     Chronic obstructive lung disease     COPD (chronic obstructive pulmonary disease)     Depressive disorder     Gastroesophageal reflux disease     Migraines     Psoriasis     TMJ (dislocation of temporomandibular joint)     Tobacco abuse        Past Surgical History:   Past Surgical History:   Procedure Laterality Date     SECTION      X3       Family History:   Family History   Problem Relation Age of Onset    Hypertension Mother     Hypertension Father     Dementia Father     Colon cancer Maternal Grandmother     Coronary artery disease Paternal Grandmother     Diabetes Paternal Grandmother     Ovarian cancer Paternal Aunt     Hypertension Other     Diabetes Other     Breast cancer Neg Hx        Social History:   Social History     Socioeconomic History    Marital status:    Tobacco Use    Smoking status: Every Day     Current packs/day: 0.25     Average packs/day: 0.3 packs/day for 43.0 years (10.7 ttl pk-yrs)     Types: Cigarettes     Start date: 4/15/1981     Passive exposure: Current    Smokeless tobacco: Never   Vaping Use    Vaping status: Never Used   Substance and Sexual Activity    Alcohol use: Never    Drug use: Never    Sexual activity: Defer       Immunizations:   Immunization History   Administered Date(s) Administered    COVID-19 (PFIZER) Purple Cap Monovalent 2021, 10/11/2021    Flu Vaccine Quad PF >36MO 2019, 10/25/2021    Fluzone (or Fluarix  & Flulaval for VFC) >6mos 12/09/2019, 10/25/2021    Fluzone Quad >6mos (Multi-dose) 11/12/2020    Influenza Injectable Mdck Pf Quad 11/08/2022, 10/31/2023    Influenza, Unspecified 11/08/2022    Pneumococcal Conjugate 13-Valent (PCV13) 12/05/2017    Pneumococcal Polysaccharide (PPSV23) 07/09/2019    Tdap 11/25/2014        Medications:     Current Outpatient Medications:     albuterol (PROVENTIL) (2.5 MG/3ML) 0.083% nebulizer solution, Take 2.5 mg by nebulization 2 (Two) Times a Day., Disp: 360 mL, Rfl: 0    albuterol (PROVENTIL) 4 MG tablet, Take 2 tablets by mouth 2 (Two) Times a Day., Disp: , Rfl:     albuterol sulfate  (90 Base) MCG/ACT inhaler, Inhale 2 puffs Every 4 (Four) Hours As Needed for Wheezing., Disp: 8.5 g, Rfl: 1    buPROPion XL (Wellbutrin XL) 150 MG 24 hr tablet, Take 1 tablet by mouth Daily., Disp: 30 tablet, Rfl: 5    famotidine (Pepcid) 40 MG tablet, Take 1 tablet by mouth Daily., Disp: 90 tablet, Rfl: 1    fluticasone (FLONASE) 50 MCG/ACT nasal spray, INSTIL 2 SPRAYS IN EACH NOSTRIL EVERY DAY FOR 7 DAYS, Disp: , Rfl:     guaiFENesin (MUCINEX) 600 MG 12 hr tablet, Take 1 tablet by mouth Every 12 (Twelve) Hours., Disp: , Rfl:     hydrOXYzine (ATARAX) 10 MG tablet, Take one tablet by oral route every 6 hrs, as needed for anxiety, Disp: 20 tablet, Rfl: 0    ipratropium-albuterol (DUO-NEB) 0.5-2.5 mg/3 ml nebulizer, Take 3 mL by nebulization 4 (Four) Times a Day As Needed for Wheezing or Shortness of Air., Disp: , Rfl:     lisinopril (PRINIVIL,ZESTRIL) 20 MG tablet, Take 1 tablet by mouth Daily., Disp: 90 tablet, Rfl: 3    loratadine (Claritin) 10 MG tablet, Take 1 tablet by mouth Daily., Disp: 90 tablet, Rfl: 3    LORazepam (Ativan) 0.5 MG tablet, O.5 - 1.0  q 6 hours prn anxiety, Disp: 60 tablet, Rfl: 1    metoprolol succinate XL (TOPROL-XL) 50 MG 24 hr tablet, Take 1 tablet by mouth Daily., Disp: , Rfl:     rosuvastatin (CRESTOR) 20 MG tablet, Take 1 tablet by mouth Daily., Disp: 90 tablet,  "Rfl: 3    Spiriva Respimat 2.5 MCG/ACT aerosol solution inhaler, INHALE 2 PUFFS BY MOUTH DAILY, Disp: 4 g, Rfl: 1    Symbicort 160-4.5 MCG/ACT inhaler, Inhale 2 puffs 2 (Two) Times a Day., Disp: , Rfl:     Allergies:   Allergies   Allergen Reactions    Latex Unknown - High Severity    Sulfa Antibiotics Hives    Sulfamethoxazole Rash    Trimethoprim Rash       Objective     Vital Signs  /78   Pulse 95   Ht 144.8 cm (57\")   Wt 69.4 kg (153 lb)   SpO2 95%   BMI 33.11 kg/m²   Estimated body mass index is 33.11 kg/m² as calculated from the following:    Height as of this encounter: 144.8 cm (57\").    Weight as of this encounter: 69.4 kg (153 lb).            Physical Exam  Vitals and nursing note reviewed.   Constitutional:       Appearance: Normal appearance. She is normal weight.   HENT:      Head: Normocephalic.      Right Ear: External ear normal.      Left Ear: External ear normal.      Nose: Nose normal.   Eyes:      Pupils: Pupils are equal, round, and reactive to light.   Neck:      Vascular: No carotid bruit.   Cardiovascular:      Rate and Rhythm: Normal rate and regular rhythm.      Pulses: Normal pulses.      Heart sounds: Normal heart sounds.   Pulmonary:      Effort: Pulmonary effort is normal.      Breath sounds: Stridor present. Wheezing and rhonchi present.   Musculoskeletal:      Cervical back: Normal range of motion and neck supple.   Skin:     General: Skin is warm and dry.   Neurological:      Mental Status: She is alert.   Psychiatric:         Mood and Affect: Mood normal.        Procedures     Assessment and Plan     1. Anxiety and depression  I will make up for that 30 mg of Ativan return to 60 mg of Ativan per month I want her to quit smoking Friday in 3 days.  Then get her off the Ativan    2. Essential hypertension  Well-controlled    3. Hyperlipidemia LDL goal <100  Well-controlled    4. Tobacco use  Cut down    5. Anxiety disorder due to general medical condition    - LORazepam " (Ativan) 0.5 MG tablet; O.5 - 1.0  q 6 hours prn anxiety  Dispense: 60 tablet; Refill: 1       Follow Up  Return in about 2 months (around 6/2/2024).    Rod VIVAR [facility]  [address] [place] [id number] [phone]

## 2024-04-18 NOTE — TELEPHONE ENCOUNTER
Caller: RON DE LA CRUZ    Relationship: Emergency Contact    Best call back number: [phone]     Caller requesting test results: XRAY     What test was performed: XRAY     When was the test performed: 10.25    Where was the test performed: OFFICE     Additional notes: PLEASE CALL WITH RESULTS      OAR[facility] reviewed. UDS: + for no results   Last seen: 2/29/2024. Follow-up:   Future Appointments   Date Time Provider Department Center   4/25/2024  1:20 PM Tamara Smith APRN - CNP [facility] [place]

## 2024-04-29 RX ORDER — PANTOPRAZOLE SODIUM 40 MG/1
40 TABLET, DELAYED RELEASE ORAL 2 TIMES DAILY
Qty: 180 TABLET | Refills: 3 | OUTPATIENT
Start: 2024-04-29

## 2024-04-29 RX ORDER — TIOTROPIUM BROMIDE INHALATION SPRAY 3.12 UG/1
SPRAY, METERED RESPIRATORY (INHALATION)
Qty: 4 G | Refills: 1 | Status: SHIPPED | OUTPATIENT
Start: 2024-04-29

## 2024-05-08 ENCOUNTER — TELEPHONE (OUTPATIENT)
Dept: FAMILY MEDICINE CLINIC | Facility: CLINIC | Age: 64
End: 2024-05-08
Payer: MEDICARE

## 2024-05-08 DIAGNOSIS — J43.2 CENTRILOBULAR EMPHYSEMA: ICD-10-CM

## 2024-05-08 RX ORDER — ALBUTEROL SULFATE 2.5 MG/3ML
2.5 SOLUTION RESPIRATORY (INHALATION) 2 TIMES DAILY
Qty: 360 ML | Refills: 0 | Status: SHIPPED | OUTPATIENT
Start: 2024-05-08

## 2024-05-08 RX ORDER — IPRATROPIUM BROMIDE AND ALBUTEROL SULFATE 2.5; .5 MG/3ML; MG/3ML
3 SOLUTION RESPIRATORY (INHALATION) 4 TIMES DAILY PRN
Qty: 360 ML | Refills: 0 | Status: CANCELLED | OUTPATIENT
Start: 2024-05-08

## 2024-05-08 RX ORDER — ALBUTEROL SULFATE 90 UG/1
2 AEROSOL, METERED RESPIRATORY (INHALATION) EVERY 4 HOURS PRN
Qty: 8.5 G | Refills: 1 | Status: SHIPPED | OUTPATIENT
Start: 2024-05-08

## 2024-05-09 RX ORDER — IPRATROPIUM BROMIDE AND ALBUTEROL SULFATE 2.5; .5 MG/3ML; MG/3ML
3 SOLUTION RESPIRATORY (INHALATION) 4 TIMES DAILY PRN
Qty: 360 ML | Refills: 5 | Status: SHIPPED | OUTPATIENT
Start: 2024-05-09

## 2024-05-13 DIAGNOSIS — F06.4 ANXIETY DISORDER DUE TO GENERAL MEDICAL CONDITION: ICD-10-CM

## 2024-05-13 NOTE — TELEPHONE ENCOUNTER
Caller: Skye Barlow Ann    Relationship: Self    Best call back number: [phone]     Requested Prescriptions:   Requested Prescriptions     Pending Prescriptions Disp Refills    LORazepam (Ativan) 0.5 MG tablet 60 tablet 1     Sig: O.5 - 1.0  q 6 hours prn anxiety        Pharmacy where request should be sent: [facility] [address] [facility] [phone] [facility] [phone]      Last office visit with prescribing clinician: 4/2/2024   Last telemedicine visit with prescribing clinician: Visit date not found   Next office visit with prescribing clinician: 6/4/2024     Additional details provided by patient: PT IS OUT OF RX.    Does the patient have less than a 3 day supply:  [x] Yes  [] No    Would you like a call back once the refill request has been completed: [] Yes [x] No    If the office needs to give you a call back, can they leave a voicemail: [] Yes [x] No    Meli Zuñiga Rep   05/13/24 09:38 EDT      [Negative] : Genitourinary

## 2024-05-14 RX ORDER — LORAZEPAM 0.5 MG/1
TABLET ORAL
Qty: 60 TABLET | Refills: 1 | Status: SHIPPED | OUTPATIENT
Start: 2024-05-14

## 2024-05-22 ENCOUNTER — OFFICE VISIT (OUTPATIENT)
Dept: FAMILY MEDICINE CLINIC | Facility: CLINIC | Age: 64
End: 2024-05-22
Payer: MEDICARE

## 2024-05-22 VITALS
WEIGHT: 147 LBS | HEART RATE: 95 BPM | OXYGEN SATURATION: 91 % | HEIGHT: 57 IN | BODY MASS INDEX: 31.71 KG/M2 | SYSTOLIC BLOOD PRESSURE: 120 MMHG | DIASTOLIC BLOOD PRESSURE: 80 MMHG

## 2024-05-22 DIAGNOSIS — J43.2 CENTRILOBULAR EMPHYSEMA: ICD-10-CM

## 2024-05-22 DIAGNOSIS — J30.1 SEASONAL ALLERGIC RHINITIS DUE TO POLLEN: ICD-10-CM

## 2024-05-22 DIAGNOSIS — J40 BRONCHITIS: Primary | ICD-10-CM

## 2024-05-22 PROCEDURE — 3074F SYST BP LT 130 MM HG: CPT | Performed by: NURSE PRACTITIONER

## 2024-05-22 PROCEDURE — 1159F MED LIST DOCD IN RCRD: CPT | Performed by: NURSE PRACTITIONER

## 2024-05-22 PROCEDURE — 99214 OFFICE O/P EST MOD 30 MIN: CPT | Performed by: NURSE PRACTITIONER

## 2024-05-22 PROCEDURE — 3079F DIAST BP 80-89 MM HG: CPT | Performed by: NURSE PRACTITIONER

## 2024-05-22 PROCEDURE — 1126F AMNT PAIN NOTED NONE PRSNT: CPT | Performed by: NURSE PRACTITIONER

## 2024-05-22 PROCEDURE — 1160F RVW MEDS BY RX/DR IN RCRD: CPT | Performed by: NURSE PRACTITIONER

## 2024-05-22 RX ORDER — PREDNISONE 20 MG/1
TABLET ORAL
Qty: 18 TABLET | Refills: 0 | Status: SHIPPED | OUTPATIENT
Start: 2024-05-22

## 2024-05-22 RX ORDER — CEFDINIR 300 MG/1
300 CAPSULE ORAL 2 TIMES DAILY
Qty: 20 CAPSULE | Refills: 0 | Status: SHIPPED | OUTPATIENT
Start: 2024-05-22

## 2024-05-22 RX ORDER — LEVOCETIRIZINE DIHYDROCHLORIDE 5 MG/1
5 TABLET, FILM COATED ORAL EVERY EVENING
Qty: 90 TABLET | Refills: 3 | Status: SHIPPED | OUTPATIENT
Start: 2024-05-22

## 2024-05-22 NOTE — PROGRESS NOTES
"Chief Complaint  Allergies    Subjective          Skye Barlow presents to [facility]  History of Present Illness  Pt has had congestion and sinus pressure x 2 weeks. She has had increased wheezing and cough for the past few days. She denies fever.       Objective   Vital Signs:   /80   Pulse 95   Ht 144.8 cm (57\")   Wt 66.7 kg (147 lb)   SpO2 91%   BMI 31.81 kg/m²     Body mass index is 31.81 kg/m².    Review of Systems   Constitutional:  Negative for fatigue and fever.   HENT:  Positive for congestion.    Respiratory:  Positive for cough. Negative for shortness of breath.    Cardiovascular:  Negative for chest pain, palpitations and leg swelling.   Neurological:  Negative for syncope.   Psychiatric/Behavioral:  The patient is not nervous/anxious.           Current Outpatient Medications:     albuterol (PROVENTIL) (2.5 MG/3ML) 0.083% nebulizer solution, Take 2.5 mg by nebulization 2 (Two) Times a Day., Disp: 360 mL, Rfl: 0    albuterol (PROVENTIL) 4 MG tablet, Take 2 tablets by mouth 2 (Two) Times a Day., Disp: , Rfl:     albuterol sulfate  (90 Base) MCG/ACT inhaler, Inhale 2 puffs Every 4 (Four) Hours As Needed for Wheezing., Disp: 8.5 g, Rfl: 1    buPROPion XL (Wellbutrin XL) 150 MG 24 hr tablet, Take 1 tablet by mouth Daily., Disp: 30 tablet, Rfl: 5    cefdinir (OMNICEF) 300 MG capsule, Take 1 capsule by mouth 2 (Two) Times a Day., Disp: 20 capsule, Rfl: 0    famotidine (Pepcid) 40 MG tablet, Take 1 tablet by mouth Daily., Disp: 90 tablet, Rfl: 1    fluticasone (FLONASE) 50 MCG/ACT nasal spray, INSTIL 2 SPRAYS IN EACH NOSTRIL EVERY DAY FOR 7 DAYS, Disp: , Rfl:     guaiFENesin (MUCINEX) 600 MG 12 hr tablet, Take 1 tablet by mouth Every 12 (Twelve) Hours., Disp: , Rfl:     hydrOXYzine (ATARAX) 10 MG tablet, Take one tablet by oral route every 6 hrs, as needed for anxiety, Disp: 20 tablet, Rfl: 0    ipratropium-albuterol (DUO-NEB) 0.5-2.5 mg/3 ml nebulizer, Take " 3 mL by nebulization 4 (Four) Times a Day As Needed for Wheezing or Shortness of Air., Disp: 360 mL, Rfl: 5    levocetirizine (XYZAL) 5 MG tablet, Take 1 tablet by mouth Every Evening., Disp: 90 tablet, Rfl: 3    lisinopril (PRINIVIL,ZESTRIL) 20 MG tablet, Take 1 tablet by mouth Daily., Disp: 90 tablet, Rfl: 3    LORazepam (Ativan) 0.5 MG tablet, O.5 - 1.0  q 6 hours prn anxiety, Disp: 60 tablet, Rfl: 1    metoprolol succinate XL (TOPROL-XL) 50 MG 24 hr tablet, Take 1 tablet by mouth Daily., Disp: , Rfl:     predniSONE (DELTASONE) 20 MG tablet, 3 QD x 3 days, 2 QD x 3 days, 1 QD x 3 days, Disp: 18 tablet, Rfl: 0    rosuvastatin (CRESTOR) 20 MG tablet, Take 1 tablet by mouth Daily., Disp: 90 tablet, Rfl: 3    Spiriva Respimat 2.5 MCG/ACT aerosol solution inhaler, INHALE 2 PUFFS BY MOUTH DAILY, Disp: 4 g, Rfl: 1    Symbicort 160-4.5 MCG/ACT inhaler, Inhale 2 puffs 2 (Two) Times a Day., Disp: , Rfl:       Allergies: Latex, Sulfa antibiotics, Sulfamethoxazole, and Trimethoprim    Physical Exam  Constitutional:       Appearance: Normal appearance.   HENT:      Head: Normocephalic.   Eyes:      Conjunctiva/sclera: Conjunctivae normal.      Pupils: Pupils are equal, round, and reactive to light.   Cardiovascular:      Rate and Rhythm: Normal rate and regular rhythm.      Heart sounds: Normal heart sounds.   Pulmonary:      Effort: Pulmonary effort is normal.      Breath sounds: Normal breath sounds.   Abdominal:      Tenderness: There is no abdominal tenderness.   Musculoskeletal:         General: Normal range of motion.   Skin:     General: Skin is warm and dry.      Capillary Refill: Capillary refill takes less than 2 seconds.   Neurological:      General: No focal deficit present.      Mental Status: She is alert and oriented to person, place, and time.   Psychiatric:         Mood and Affect: Mood normal.         Behavior: Behavior normal.         Thought Content: Thought content normal.         Judgment: Judgment  normal.          Result Review :                   Assessment and Plan    Diagnoses and all orders for this visit:    1. Bronchitis (Primary)  Comments:  Increase fluids. Finish antibiotics. Mucinex and Albuterol PRN. RTC if not improving in 1 week or sooner for worsened sx.  Orders:  -     predniSONE (DELTASONE) 20 MG tablet; 3 QD x 3 days, 2 QD x 3 days, 1 QD x 3 days  Dispense: 18 tablet; Refill: 0  -     cefdinir (OMNICEF) 300 MG capsule; Take 1 capsule by mouth 2 (Two) Times a Day.  Dispense: 20 capsule; Refill: 0    2. Centrilobular emphysema  Comments:  Continue Symbicort and Spiriva. Albuterol PRN.    3. Seasonal allergic rhinitis due to pollen  Comments:  Try Xyzal.  Orders:  -     levocetirizine (XYZAL) 5 MG tablet; Take 1 tablet by mouth Every Evening.  Dispense: 90 tablet; Refill: 3                Follow Up   Return in about 1 week (around 5/29/2024) for if not improving or sooner if symptoms worsen.  Patient was given instructions and counseling regarding her condition or for health maintenance advice. Please see specific information pulled into the AVS if appropriate.     Rupal Stone, RICKEY

## 2024-05-28 RX ORDER — METOPROLOL SUCCINATE 50 MG/1
TABLET, EXTENDED RELEASE ORAL DAILY
Qty: 90 TABLET | OUTPATIENT
Start: 2024-05-28

## 2024-05-28 NOTE — TELEPHONE ENCOUNTER
Tried calling patient she will need to have an appointment before we can refill her medications. No voicemail set up

## 2024-06-04 ENCOUNTER — OFFICE VISIT (OUTPATIENT)
Dept: FAMILY MEDICINE CLINIC | Facility: CLINIC | Age: 64
End: 2024-06-04
Payer: MEDICARE

## 2024-06-04 VITALS
BODY MASS INDEX: 31.71 KG/M2 | OXYGEN SATURATION: 97 % | WEIGHT: 147 LBS | HEART RATE: 67 BPM | HEIGHT: 57 IN | SYSTOLIC BLOOD PRESSURE: 124 MMHG | DIASTOLIC BLOOD PRESSURE: 82 MMHG

## 2024-06-04 DIAGNOSIS — F32.A ANXIETY AND DEPRESSION: Primary | ICD-10-CM

## 2024-06-04 DIAGNOSIS — I10 ESSENTIAL HYPERTENSION: ICD-10-CM

## 2024-06-04 DIAGNOSIS — J43.2 CENTRILOBULAR EMPHYSEMA: ICD-10-CM

## 2024-06-04 DIAGNOSIS — F41.9 ANXIETY AND DEPRESSION: Primary | ICD-10-CM

## 2024-06-04 PROBLEM — Z72.0 TOBACCO USE: Status: RESOLVED | Noted: 2021-09-10 | Resolved: 2024-06-04

## 2024-06-04 PROCEDURE — 1159F MED LIST DOCD IN RCRD: CPT | Performed by: FAMILY MEDICINE

## 2024-06-04 PROCEDURE — 3079F DIAST BP 80-89 MM HG: CPT | Performed by: FAMILY MEDICINE

## 2024-06-04 PROCEDURE — 99213 OFFICE O/P EST LOW 20 MIN: CPT | Performed by: FAMILY MEDICINE

## 2024-06-04 PROCEDURE — 1126F AMNT PAIN NOTED NONE PRSNT: CPT | Performed by: FAMILY MEDICINE

## 2024-06-04 PROCEDURE — 1160F RVW MEDS BY RX/DR IN RCRD: CPT | Performed by: FAMILY MEDICINE

## 2024-06-04 PROCEDURE — 3074F SYST BP LT 130 MM HG: CPT | Performed by: FAMILY MEDICINE

## 2024-06-04 RX ORDER — BUPROPION HYDROCHLORIDE 300 MG/1
300 TABLET ORAL DAILY
Qty: 30 TABLET | Refills: 5 | Status: SHIPPED | OUTPATIENT
Start: 2024-06-04

## 2024-06-04 NOTE — PROGRESS NOTES
"    Office Note     Name: Skye Barlow    : 1960     MRN: [id number]     Chief Complaint  Med Refill    Subjective     History of Present Illness:  Skye Barlow is a 64 y.o. female who presents today for who ran out of her lorazepam 3 weeks ago but I called been in for her.  Her legs were \"vibrating\".  It could have been withdrawal from the lorazepam but I will have Marivel we instituted.  They said she should have his Social Security number.  Talked about the 3 dying her family she is trying to hold the family together but still smoking    Review of Systems:   Review of Systems    Past Medical History:   Past Medical History:   Diagnosis Date    Anxiety and depression 2024    Anxiety state     Benign essential hypertension     Chronic obstructive lung disease     COPD (chronic obstructive pulmonary disease)     Depressive disorder     Gastroesophageal reflux disease     Migraines     Psoriasis     TMJ (dislocation of temporomandibular joint)     Tobacco abuse        Past Surgical History:   Past Surgical History:   Procedure Laterality Date     SECTION      X3       Family History:   Family History   Problem Relation Age of Onset    Hypertension Mother     Hypertension Father     Dementia Father     Colon cancer Maternal Grandmother     Coronary artery disease Paternal Grandmother     Diabetes Paternal Grandmother     Ovarian cancer Paternal Aunt     Hypertension Other     Diabetes Other     Breast cancer Neg Hx        Social History:   Social History     Socioeconomic History    Marital status:    Tobacco Use    Smoking status: Every Day     Current packs/day: 0.25     Average packs/day: 0.2 packs/day for 43.1 years (10.8 ttl pk-yrs)     Types: Cigarettes     Start date: 4/15/1981     Passive exposure: Current    Smokeless tobacco: Never   Vaping Use    Vaping status: Never Used   Substance and Sexual Activity    Alcohol use: Never    Drug use: Never    Sexual activity: Defer "       Immunizations:   Immunization History   Administered Date(s) Administered    COVID-19 (PFIZER) Purple Cap Monovalent 08/31/2021, 10/11/2021    Flu Vaccine Quad PF >36MO 12/09/2019, 10/25/2021    Fluzone (or Fluarix & Flulaval for VFC) >6mos 12/09/2019, 10/25/2021    Fluzone Quad >6mos (Multi-dose) 11/12/2020    Influenza Injectable Mdck Pf Quad 11/08/2022, 10/31/2023    Influenza, Unspecified 11/08/2022    Pneumococcal Conjugate 13-Valent (PCV13) 12/05/2017    Pneumococcal Polysaccharide (PPSV23) 07/09/2019    Tdap 11/25/2014        Medications:     Current Outpatient Medications:     albuterol (PROVENTIL) (2.5 MG/3ML) 0.083% nebulizer solution, Take 2.5 mg by nebulization 2 (Two) Times a Day., Disp: 360 mL, Rfl: 0    albuterol (PROVENTIL) 4 MG tablet, Take 2 tablets by mouth 2 (Two) Times a Day., Disp: , Rfl:     albuterol sulfate  (90 Base) MCG/ACT inhaler, Inhale 2 puffs Every 4 (Four) Hours As Needed for Wheezing., Disp: 8.5 g, Rfl: 1    famotidine (Pepcid) 40 MG tablet, Take 1 tablet by mouth Daily., Disp: 90 tablet, Rfl: 1    fluticasone (FLONASE) 50 MCG/ACT nasal spray, INSTIL 2 SPRAYS IN EACH NOSTRIL EVERY DAY FOR 7 DAYS, Disp: , Rfl:     guaiFENesin (MUCINEX) 600 MG 12 hr tablet, Take 1 tablet by mouth Every 12 (Twelve) Hours., Disp: , Rfl:     hydrOXYzine (ATARAX) 10 MG tablet, Take one tablet by oral route every 6 hrs, as needed for anxiety, Disp: 20 tablet, Rfl: 0    ipratropium-albuterol (DUO-NEB) 0.5-2.5 mg/3 ml nebulizer, Take 3 mL by nebulization 4 (Four) Times a Day As Needed for Wheezing or Shortness of Air., Disp: 360 mL, Rfl: 5    levocetirizine (XYZAL) 5 MG tablet, Take 1 tablet by mouth Every Evening., Disp: 90 tablet, Rfl: 3    lisinopril (PRINIVIL,ZESTRIL) 20 MG tablet, Take 1 tablet by mouth Daily., Disp: 90 tablet, Rfl: 3    LORazepam (Ativan) 0.5 MG tablet, O.5 - 1.0  q 6 hours prn anxiety, Disp: 60 tablet, Rfl: 1    metoprolol succinate XL (TOPROL-XL) 50 MG 24 hr tablet, Take  "1 tablet by mouth Daily., Disp: , Rfl:     rosuvastatin (CRESTOR) 20 MG tablet, Take 1 tablet by mouth Daily., Disp: 90 tablet, Rfl: 3    Spiriva Respimat 2.5 MCG/ACT aerosol solution inhaler, INHALE 2 PUFFS BY MOUTH DAILY, Disp: 4 g, Rfl: 1    Symbicort 160-4.5 MCG/ACT inhaler, Inhale 2 puffs 2 (Two) Times a Day., Disp: , Rfl:     buPROPion XL (Wellbutrin XL) 300 MG 24 hr tablet, Take 1 tablet by mouth Daily., Disp: 30 tablet, Rfl: 5    Allergies:   Allergies   Allergen Reactions    Latex Unknown - High Severity    Sulfa Antibiotics Hives    Sulfamethoxazole Rash    Trimethoprim Rash       Objective     Vital Signs  /82   Pulse 67   Ht 144.8 cm (57\")   Wt 66.7 kg (147 lb)   SpO2 97%   BMI 31.81 kg/m²   Estimated body mass index is 31.81 kg/m² as calculated from the following:    Height as of this encounter: 144.8 cm (57\").    Weight as of this encounter: 66.7 kg (147 lb).            Physical Exam  Constitutional:       General: She is not in acute distress.     Appearance: Normal appearance. She is obese. She is not toxic-appearing or diaphoretic.   HENT:      Head: Normocephalic and atraumatic.      Right Ear: External ear normal.      Left Ear: External ear normal.      Nose: Nose normal.   Eyes:      Pupils: Pupils are equal, round, and reactive to light.   Skin:     General: Skin is warm and dry.   Neurological:      Mental Status: She is alert.   Psychiatric:         Mood and Affect: Mood normal.         Behavior: Behavior normal.          Procedures     Assessment and Plan     1. Anxiety and depression  Refilled the lorazepam No. 60 refills 1    2. Centrilobular emphysema  Emphysema is going to get worse as long as she is still smoking    3. Essential hypertension  Doing well       Follow Up  Return in about 3 months (around 9/4/2024).    Rod VIVAR [facility]  [address] [place] [id number] [phone]  "

## 2024-06-06 ENCOUNTER — OFFICE VISIT (OUTPATIENT)
Dept: FAMILY MEDICINE CLINIC | Facility: CLINIC | Age: 64
End: 2024-06-06
Payer: MEDICARE

## 2024-06-06 VITALS
SYSTOLIC BLOOD PRESSURE: 120 MMHG | TEMPERATURE: 97.6 F | DIASTOLIC BLOOD PRESSURE: 60 MMHG | BODY MASS INDEX: 30.63 KG/M2 | OXYGEN SATURATION: 98 % | WEIGHT: 142 LBS | HEART RATE: 84 BPM | HEIGHT: 57 IN

## 2024-06-06 DIAGNOSIS — J40 BRONCHITIS: ICD-10-CM

## 2024-06-06 DIAGNOSIS — Z79.899 ENCOUNTER FOR LONG-TERM (CURRENT) USE OF OTHER MEDICATIONS: ICD-10-CM

## 2024-06-06 DIAGNOSIS — J43.2 CENTRILOBULAR EMPHYSEMA: Primary | ICD-10-CM

## 2024-06-06 LAB
EXPIRATION DATE: NORMAL
FLUAV AG UPPER RESP QL IA.RAPID: NOT DETECTED
FLUBV AG UPPER RESP QL IA.RAPID: NOT DETECTED
GLUCOSE BLDC GLUCOMTR-MCNC: 122 MG/DL (ref 70–130)
INTERNAL CONTROL: NORMAL
Lab: NORMAL
SARS-COV-2 AG UPPER RESP QL IA.RAPID: NOT DETECTED

## 2024-06-06 PROCEDURE — 99214 OFFICE O/P EST MOD 30 MIN: CPT | Performed by: NURSE PRACTITIONER

## 2024-06-06 PROCEDURE — 1159F MED LIST DOCD IN RCRD: CPT | Performed by: NURSE PRACTITIONER

## 2024-06-06 PROCEDURE — 3078F DIAST BP <80 MM HG: CPT | Performed by: NURSE PRACTITIONER

## 2024-06-06 PROCEDURE — 82948 REAGENT STRIP/BLOOD GLUCOSE: CPT | Performed by: NURSE PRACTITIONER

## 2024-06-06 PROCEDURE — 3074F SYST BP LT 130 MM HG: CPT | Performed by: NURSE PRACTITIONER

## 2024-06-06 PROCEDURE — 1126F AMNT PAIN NOTED NONE PRSNT: CPT | Performed by: NURSE PRACTITIONER

## 2024-06-06 PROCEDURE — 1160F RVW MEDS BY RX/DR IN RCRD: CPT | Performed by: NURSE PRACTITIONER

## 2024-06-06 PROCEDURE — 87428 SARSCOV & INF VIR A&B AG IA: CPT | Performed by: NURSE PRACTITIONER

## 2024-06-06 RX ORDER — PREDNISONE 20 MG/1
40 TABLET ORAL DAILY
Qty: 10 TABLET | Refills: 0 | Status: SHIPPED | OUTPATIENT
Start: 2024-06-06

## 2024-06-06 RX ORDER — TIOTROPIUM BROMIDE INHALATION SPRAY 3.12 UG/1
SPRAY, METERED RESPIRATORY (INHALATION)
Qty: 4 G | Refills: 1 | Status: SHIPPED | OUTPATIENT
Start: 2024-06-06

## 2024-06-06 RX ORDER — AZITHROMYCIN 250 MG/1
TABLET, FILM COATED ORAL
Qty: 6 TABLET | Refills: 0 | Status: SHIPPED | OUTPATIENT
Start: 2024-06-06 | End: 2024-06-11

## 2024-06-06 NOTE — PROGRESS NOTES
"Chief Complaint  Cough (Coughing, sore throat, drainage started yesterday )    Subjective          Skye Barlow presents to [facility]  Cough  Associated symptoms include chills and wheezing. Pertinent negatives include no chest pain, ear pain, fever, rash or sore throat.       Patient states for the past couple of days she has had a productive cough rattling in her lungs and wheezing.  She does have COPD so is unsure if it is a COPD flareup.  States she is having some sinus drainage and sinus pressure as well.  States she does feel like she has had some chills.  No sick contacts that she is aware of.  She does continue to smoke but is cutting down and states she smokes 1 to 2 cigarettes/day.  She does continue to follow-up with her pulmonologist Dr. Tubbs and states she was actually supposed to have a pulmonary function test today but could not go due to feeling like this.  No dizziness no headache no chest pain no chest pressure.  No urinary or bowel issues.  States she had a similar issue a few weeks ago but it cleared up with a round of steroids and cefdinir.    Objective   Vital Signs:   /60   Pulse 84   Temp 97.6 °F (36.4 °C)   Ht 144.8 cm (57\")   Wt 64.4 kg (142 lb)   SpO2 98%   BMI 30.73 kg/m²     Body mass index is 30.73 kg/m².    Review of Systems   Constitutional:  Positive for chills. Negative for fatigue and fever.   HENT:  Positive for congestion. Negative for ear pain, sneezing, sore throat, swollen glands and trouble swallowing.    Eyes:  Negative for visual disturbance.   Respiratory:  Positive for cough and wheezing. Negative for chest tightness.    Cardiovascular:  Negative for chest pain, palpitations and leg swelling.   Gastrointestinal:  Negative for abdominal pain, constipation, diarrhea, nausea and vomiting.   Genitourinary:  Negative for decreased urine volume, dysuria, frequency, hematuria and urgency.   Musculoskeletal:  Negative for back " pain.   Skin:  Negative for rash.   Neurological:  Negative for dizziness, weakness and headache.       Past History:  Medical History: has a past medical history of Anxiety and depression (2024), Anxiety state, Benign essential hypertension, Chronic obstructive lung disease, COPD (chronic obstructive pulmonary disease), Depressive disorder, Gastroesophageal reflux disease, Migraines, Psoriasis, TMJ (dislocation of temporomandibular joint), and Tobacco abuse.   Surgical History: has a past surgical history that includes  section.   Family History: family history includes Colon cancer in her maternal grandmother; Coronary artery disease in her paternal grandmother; Dementia in her father; Diabetes in her paternal grandmother and another family member; Hypertension in her father, mother, and another family member; Ovarian cancer in her paternal aunt.   Social History: reports that she has been smoking cigarettes. She started smoking about 43 years ago. She has a 10.8 pack-year smoking history. She has been exposed to tobacco smoke. She has never used smokeless tobacco. She reports that she does not drink alcohol and does not use drugs.    PHQ-2 Depression Screening  Little interest or pleasure in doing things?     Feeling down, depressed, or hopeless?     PHQ-2 Total Score          PHQ-9 Depression Screening  Little interest or pleasure in doing things?     Feeling down, depressed, or hopeless?     Trouble falling or staying asleep, or sleeping too much?     Feeling tired or having little energy?     Poor appetite or overeating?     Feeling bad about yourself - or that you are a failure or have let yourself or your family down?     Trouble concentrating on things, such as reading the newspaper or watching television?     Moving or speaking so slowly that other people could have noticed? Or the opposite - being so fidgety or restless that you have been moving around a lot more than usual?     Thoughts that  you would be better off dead, or of hurting yourself in some way?     PHQ-9 Total Score     If you checked off any problems, how difficult have these problems made it for you to do your work, take care of things at home, or get along with other people?       PHQ-9 Total Score:        Patient screened positive for depression based on a PHQ-9 score of 0 on 4/2/2024. Follow-up recommendations include:          Current Outpatient Medications:     albuterol (PROVENTIL) (2.5 MG/3ML) 0.083% nebulizer solution, Take 2.5 mg by nebulization 2 (Two) Times a Day., Disp: 360 mL, Rfl: 0    albuterol (PROVENTIL) 4 MG tablet, Take 2 tablets by mouth 2 (Two) Times a Day., Disp: , Rfl:     albuterol sulfate  (90 Base) MCG/ACT inhaler, Inhale 2 puffs Every 4 (Four) Hours As Needed for Wheezing., Disp: 8.5 g, Rfl: 1    buPROPion XL (Wellbutrin XL) 300 MG 24 hr tablet, Take 1 tablet by mouth Daily., Disp: 30 tablet, Rfl: 5    famotidine (Pepcid) 40 MG tablet, Take 1 tablet by mouth Daily., Disp: 90 tablet, Rfl: 1    fluticasone (FLONASE) 50 MCG/ACT nasal spray, INSTIL 2 SPRAYS IN EACH NOSTRIL EVERY DAY FOR 7 DAYS, Disp: , Rfl:     guaiFENesin (MUCINEX) 600 MG 12 hr tablet, Take 1 tablet by mouth Every 12 (Twelve) Hours., Disp: , Rfl:     hydrOXYzine (ATARAX) 10 MG tablet, Take one tablet by oral route every 6 hrs, as needed for anxiety, Disp: 20 tablet, Rfl: 0    ipratropium-albuterol (DUO-NEB) 0.5-2.5 mg/3 ml nebulizer, Take 3 mL by nebulization 4 (Four) Times a Day As Needed for Wheezing or Shortness of Air., Disp: 360 mL, Rfl: 5    levocetirizine (XYZAL) 5 MG tablet, Take 1 tablet by mouth Every Evening., Disp: 90 tablet, Rfl: 3    lisinopril (PRINIVIL,ZESTRIL) 20 MG tablet, Take 1 tablet by mouth Daily., Disp: 90 tablet, Rfl: 3    LORazepam (Ativan) 0.5 MG tablet, O.5 - 1.0  q 6 hours prn anxiety, Disp: 60 tablet, Rfl: 1    metoprolol succinate XL (TOPROL-XL) 50 MG 24 hr tablet, Take 1 tablet by mouth Daily., Disp: , Rfl:      rosuvastatin (CRESTOR) 20 MG tablet, Take 1 tablet by mouth Daily., Disp: 90 tablet, Rfl: 3    Spiriva Respimat 2.5 MCG/ACT aerosol solution inhaler, INHALE 2 PUFFS BY MOUTH DAILY, Disp: 4 g, Rfl: 1    Symbicort 160-4.5 MCG/ACT inhaler, Inhale 2 puffs 2 (Two) Times a Day., Disp: , Rfl:     azithromycin (Zithromax Z-Colin) 250 MG tablet, Take 2 tablets by mouth Daily for 1 day, THEN 1 tablet Daily for 4 days., Disp: 6 tablet, Rfl: 0    predniSONE (DELTASONE) 20 MG tablet, Take 2 tablets by mouth Daily., Disp: 10 tablet, Rfl: 0   (Not in a hospital admission)     Allergies: Latex, Sulfa antibiotics, Sulfamethoxazole, and Trimethoprim    Physical Exam  Constitutional:       Appearance: Normal appearance.   HENT:      Right Ear: Tympanic membrane, ear canal and external ear normal.      Left Ear: Tympanic membrane, ear canal and external ear normal.      Nose: Nose normal.      Mouth/Throat:      Mouth: Mucous membranes are moist.      Pharynx: Oropharynx is clear.   Cardiovascular:      Rate and Rhythm: Normal rate and regular rhythm.      Heart sounds: Normal heart sounds.   Pulmonary:      Effort: Pulmonary effort is normal. No respiratory distress.      Breath sounds: No stridor. Wheezing and rhonchi present.   Neurological:      General: No focal deficit present.      Mental Status: She is alert and oriented to person, place, and time. Mental status is at baseline.   Psychiatric:         Mood and Affect: Mood normal.         Behavior: Behavior normal.         Thought Content: Thought content normal.         Judgment: Judgment normal.          Result Review :                   Assessment and Plan    Diagnoses and all orders for this visit:    1. Centrilobular emphysema (Primary)  -     XR Chest PA & Lateral (In Office)    2. Bronchitis  -     XR Chest PA & Lateral (In Office)  -     predniSONE (DELTASONE) 20 MG tablet; Take 2 tablets by mouth Daily.  Dispense: 10 tablet; Refill: 0  -     azithromycin (Zithromax Z-Colin)  250 MG tablet; Take 2 tablets by mouth Daily for 1 day, THEN 1 tablet Daily for 4 days.  Dispense: 6 tablet; Refill: 0  -     Covid-19 + Flu A&B AG, Veritor; Future  -     Covid-19 + Flu A&B AG, Veritor    3. Encounter for long-term (current) use of other medications  -     POC Glucose; Future  -     POC Glucose      Chest x-ray completed.  Will let know if radiologist sees anything.  Antibiotic as prescribed.  Prednisone as prescribed.  Avoid NSAIDs.  On spiriva and symbicort. She has rescue inhalers and nebulizers to use as needed for wheezing.  Smoking cessation discussed and encouraged.  COVID and flu both negative today in clinic.  Risk of meds discussed and understood.  Education provided.  Encouraged her to follow-up closely with her pulmonologist Dr. Tubbs.  Go to ED if worsens.  Return to clinic or ED with any issues or concerns.                      Follow Up   Return if symptoms worsen or fail to improve.  Patient was given instructions and counseling regarding her condition or for health maintenance advice. Please see specific information pulled into the AVS if appropriate.     RICKEY Costa

## 2024-06-10 ENCOUNTER — OFFICE VISIT (OUTPATIENT)
Dept: FAMILY MEDICINE CLINIC | Facility: CLINIC | Age: 64
End: 2024-06-10
Payer: MEDICARE

## 2024-06-10 VITALS
DIASTOLIC BLOOD PRESSURE: 70 MMHG | WEIGHT: 145 LBS | BODY MASS INDEX: 31.28 KG/M2 | HEART RATE: 95 BPM | SYSTOLIC BLOOD PRESSURE: 140 MMHG | HEIGHT: 57 IN | OXYGEN SATURATION: 97 %

## 2024-06-10 DIAGNOSIS — R09.81 NASAL CONGESTION: ICD-10-CM

## 2024-06-10 DIAGNOSIS — J02.9 SORE THROAT: ICD-10-CM

## 2024-06-10 DIAGNOSIS — J06.9 ACUTE URI: Primary | ICD-10-CM

## 2024-06-10 DIAGNOSIS — R06.2 WHEEZING: ICD-10-CM

## 2024-06-10 LAB
EXPIRATION DATE: NORMAL
EXPIRATION DATE: NORMAL
FLUAV AG UPPER RESP QL IA.RAPID: NOT DETECTED
FLUBV AG UPPER RESP QL IA.RAPID: NOT DETECTED
INTERNAL CONTROL: NORMAL
INTERNAL CONTROL: NORMAL
Lab: NORMAL
Lab: NORMAL
S PYO AG THROAT QL: NEGATIVE
SARS-COV-2 AG UPPER RESP QL IA.RAPID: NOT DETECTED

## 2024-06-10 PROCEDURE — 99213 OFFICE O/P EST LOW 20 MIN: CPT | Performed by: NURSE PRACTITIONER

## 2024-06-10 PROCEDURE — 87880 STREP A ASSAY W/OPTIC: CPT | Performed by: NURSE PRACTITIONER

## 2024-06-10 PROCEDURE — 3077F SYST BP >= 140 MM HG: CPT | Performed by: NURSE PRACTITIONER

## 2024-06-10 PROCEDURE — 1126F AMNT PAIN NOTED NONE PRSNT: CPT | Performed by: NURSE PRACTITIONER

## 2024-06-10 PROCEDURE — 3078F DIAST BP <80 MM HG: CPT | Performed by: NURSE PRACTITIONER

## 2024-06-10 PROCEDURE — 87428 SARSCOV & INF VIR A&B AG IA: CPT | Performed by: NURSE PRACTITIONER

## 2024-06-10 NOTE — ASSESSMENT & PLAN NOTE
Patient reports that she was seen last week in this office and diagnosed with bronchitis, started on Zithromax and prednisone.  She reports her last dose for each of these medications will be later today.  Her symptoms have been ongoing since late Friday night/early Saturday morning the week before.    We discussed treatment options, including but not limited to IM Kenalog today.  Patient declines to do this today.  APRN strongly encouraged patient to finish full course of medications and continue to use albuterol every 4 hours as needed.

## 2024-06-10 NOTE — ASSESSMENT & PLAN NOTE
Patient reports she has continued with sore throat since she was seen last week.  She has been taking Tylenol and Motrin as needed, has tried throat lozenges and Chloraseptic spray, has gargled salt water per her report as APRN was suggesting these as intervention options.  She reports nothing has been effective.  She denies any white coating on her tongue, gums, insides of her cheeks.    We discussed treatment options, including but not limited to IM Kenalog today. Patient declines to do this today. APRN strongly encouraged patient to finish full course of medications and return to the office if her symptoms are not resolving.

## 2024-06-10 NOTE — ASSESSMENT & PLAN NOTE
Patient reports she has albuterol inhaler and albuterol solution for nebulizer both at her dispense at home.  She reports her last albuterol rescue inhaler was 2 hours ago, her last albuterol nebulizer and treatment was at 0500 this morning.    She continues to be an everyday smoker, continues to smoke during this illness.  APRN discussed smoking cessation with the patient, she is not interested.    We discussed treatment options, including but not limited to IM Kenalog today. Patient declines to do this today. APRN strongly encouraged patient to finish full course of medications and return to the office if her symptoms are not improving.

## 2024-06-11 ENCOUNTER — TELEPHONE (OUTPATIENT)
Dept: FAMILY MEDICINE CLINIC | Facility: CLINIC | Age: 64
End: 2024-06-11
Payer: MEDICARE

## 2024-06-11 NOTE — TELEPHONE ENCOUNTER
Spoke with patient. She said that she couldn't wait for someone to call her back today and she found a previous medrol dose pack prescription that she has been given last year and she started taking that today.   I relayed provider message about needing to be seen again or go to the ER. Pt just stated that she did not want it to get bad enough to have to go to the ER.

## 2024-06-11 NOTE — TELEPHONE ENCOUNTER
Pt contacted. Pt instructed to stop taking the old prescription of predisone she received in the past year. Discussed with mc and gretchen about what should be done now. Pt instructed to go to er for further test and iv antibiotics and fluids. Pt understood.

## 2024-06-11 NOTE — TELEPHONE ENCOUNTER
Caller: Skye Barlow    Relationship: Self    Best call back number: [phone]     What medication are you requesting: STEROID     What are your current symptoms: COPD: SHORTNESS OF AIR, WHEEZING, CONGESTION, DRAINAGE, PRESSURE     How long have you been experiencing symptoms: 1 WEEK    Have you had these symptoms before:    [x] Yes  [] No    Have you been treated for these symptoms before:   [x] Yes  [] No    If a prescription is needed, what is your preferred pharmacy and phone number: [id number] [address] AT [facility] [address] [facility] - [phone] [facility] [phone]      Additional notes: PATIENT SEEN RICKEY KATE YESTERDAY 06/10/24. PATIENT WANTS TO GO AHEAD AND START STEROIDS AS HER SYMPTOMS ARE WORSE THIS MORNING, ESPECIALLY HER SHORTNESS OF AIR. PLEASE ADVISE AND CALL PATIENT

## 2024-06-13 NOTE — ASSESSMENT & PLAN NOTE
Patient reports she has continued with nasal congestion, denies use of decongestants and declines to use nasal sprays.

## 2024-06-14 NOTE — PROGRESS NOTES
"    Office Note     Name: Skye Barlow    : 1960     MRN: [id number]     Chief Complaint  Sore Throat (Pt has had a sore throat since Saturday and has had body chills since last Wednesday ) and Nasal Congestion (Pt has been having congestion and cough, Unable to sleep at night )    Subjective     History of Present Illness:  Skye Barlow is a 64 y.o. female who presents today for continued symptoms despite treatment with Zithromax and prednisone.  She was seen last week and diagnosed with bronchitis.      Objective     Past Medical History:   Diagnosis Date    Anxiety and depression 2024    Anxiety state     Benign essential hypertension     Chronic obstructive lung disease     COPD (chronic obstructive pulmonary disease)     Depressive disorder     Gastroesophageal reflux disease     Migraines     Psoriasis     TMJ (dislocation of temporomandibular joint)     Tobacco abuse      Past Surgical History:   Procedure Laterality Date     SECTION      X3     Family History   Problem Relation Age of Onset    Hypertension Mother     Hypertension Father     Dementia Father     Colon cancer Maternal Grandmother     Coronary artery disease Paternal Grandmother     Diabetes Paternal Grandmother     Ovarian cancer Paternal Aunt     Hypertension Other     Diabetes Other     Breast cancer Neg Hx        Vital Signs  /70 (BP Location: Left arm)   Pulse 95   Ht 144.8 cm (57\")   Wt 65.8 kg (145 lb)   SpO2 97%   BMI 31.38 kg/m²   Estimated body mass index is 31.38 kg/m² as calculated from the following:    Height as of this encounter: 144.8 cm (57\").    Weight as of this encounter: 65.8 kg (145 lb).    Physical Exam  Vitals reviewed.   Constitutional:       Appearance: Normal appearance.   HENT:      Head: Normocephalic.      Right Ear: Tympanic membrane, ear canal and external ear normal.      Left Ear: Tympanic membrane, ear canal and external ear normal.      Nose: Nose normal.      " Mouth/Throat:      Mouth: Mucous membranes are moist.      Pharynx: Posterior oropharyngeal erythema (Minimal) present.   Cardiovascular:      Rate and Rhythm: Normal rate and regular rhythm.      Heart sounds: Normal heart sounds.   Pulmonary:      Effort: Pulmonary effort is normal.      Breath sounds: Decreased air movement present. Examination of the right-upper field reveals wheezing. Examination of the left-upper field reveals wheezing. Examination of the right-middle field reveals wheezing. Examination of the left-middle field reveals wheezing. Examination of the right-lower field reveals wheezing. Examination of the left-lower field reveals wheezing. Wheezing present.   Skin:     General: Skin is warm and dry.      Capillary Refill: Capillary refill takes less than 2 seconds.   Neurological:      General: No focal deficit present.      Mental Status: She is alert and oriented to person, place, and time.   Psychiatric:         Mood and Affect: Mood normal.         Behavior: Behavior normal.        POCT Results (if applicable):  Results for orders placed or performed in visit on 06/10/24   POCT rapid strep A    Specimen: Swab   Result Value Ref Range    Rapid Strep A Screen Negative Negative, VALID, INVALID, Not Performed    Internal Control Passed Passed    Lot Number [id number]     Expiration Date 10/01/2026    POCT SARS-CoV-2 Antigen MARCUS + Flu    Specimen: Swab   Result Value Ref Range    SARS Antigen Not Detected Not Detected, Presumptive Negative    Influenza A Antigen MARCUS Not Detected Not Detected    Influenza B Antigen MARCUS Not Detected Not Detected    Internal Control Passed Passed    Lot Number [id number]     Expiration Date 02/15/2025             Assessment and Plan     Diagnoses and all orders for this visit:    1. Acute URI (Primary)  Assessment & Plan:  Patient reports that she was seen last week in this office and diagnosed with bronchitis, started on Zithromax and prednisone.  She reports her last  dose for each of these medications will be later today.  Her symptoms have been ongoing since late Friday night/early Saturday morning the week before.    We discussed treatment options, including but not limited to IM Kenalog today.  Patient declines to do this today.  APRN strongly encouraged patient to finish full course of medications and continue to use albuterol every 4 hours as needed.      2. Sore throat  Assessment & Plan:  Patient reports she has continued with sore throat since she was seen last week.  She has been taking Tylenol and Motrin as needed, has tried throat lozenges and Chloraseptic spray, has gargled salt water per her report as APRN was suggesting these as intervention options.  She reports nothing has been effective.  She denies any white coating on her tongue, gums, insides of her cheeks.    We discussed treatment options, including but not limited to IM Kenalog today. Patient declines to do this today. APRN strongly encouraged patient to finish full course of medications and return to the office if her symptoms are not resolving.      3. Nasal congestion  Assessment & Plan:  Patient reports she has continued with nasal congestion, denies use of decongestants and declines to use nasal sprays.    Orders:  -     POCT rapid strep A  -     POCT SARS-CoV-2 Antigen MARCUS + Flu    4. Wheezing  Assessment & Plan:  Patient reports she has albuterol inhaler and albuterol solution for nebulizer both at her dispense at home.  She reports her last albuterol rescue inhaler was 2 hours ago, her last albuterol nebulizer and treatment was at 0500 this morning.    She continues to be an everyday smoker, continues to smoke during this illness.  APRN discussed smoking cessation with the patient, she is not interested.    We discussed treatment options, including but not limited to IM Kenalog today. Patient declines to do this today. APRN strongly encouraged patient to finish full course of medications and return  to the office if her symptoms are not improving.           Follow Up  Return if symptoms worsen or fail to improve.  APRN strongly encouraged ER evaluation if patient's symptoms worsen to the point where she is having shortness of breath.    RICKEY Barrett

## 2024-06-18 ENCOUNTER — OFFICE VISIT (OUTPATIENT)
Dept: FAMILY MEDICINE CLINIC | Facility: CLINIC | Age: 64
End: 2024-06-18
Payer: MEDICARE

## 2024-06-18 VITALS
OXYGEN SATURATION: 98 % | HEART RATE: 90 BPM | SYSTOLIC BLOOD PRESSURE: 144 MMHG | DIASTOLIC BLOOD PRESSURE: 86 MMHG | HEIGHT: 57 IN | BODY MASS INDEX: 31.28 KG/M2 | WEIGHT: 145 LBS

## 2024-06-18 DIAGNOSIS — J43.2 CENTRILOBULAR EMPHYSEMA: Primary | ICD-10-CM

## 2024-06-18 DIAGNOSIS — R09.81 NASAL CONGESTION: ICD-10-CM

## 2024-06-18 DIAGNOSIS — I10 ESSENTIAL HYPERTENSION: ICD-10-CM

## 2024-06-18 DIAGNOSIS — E78.5 HYPERLIPIDEMIA LDL GOAL <100: ICD-10-CM

## 2024-06-18 PROCEDURE — 1159F MED LIST DOCD IN RCRD: CPT | Performed by: FAMILY MEDICINE

## 2024-06-18 PROCEDURE — 1160F RVW MEDS BY RX/DR IN RCRD: CPT | Performed by: FAMILY MEDICINE

## 2024-06-18 PROCEDURE — 99214 OFFICE O/P EST MOD 30 MIN: CPT | Performed by: FAMILY MEDICINE

## 2024-06-18 PROCEDURE — 1126F AMNT PAIN NOTED NONE PRSNT: CPT | Performed by: FAMILY MEDICINE

## 2024-06-18 PROCEDURE — 3077F SYST BP >= 140 MM HG: CPT | Performed by: FAMILY MEDICINE

## 2024-06-18 PROCEDURE — 3079F DIAST BP 80-89 MM HG: CPT | Performed by: FAMILY MEDICINE

## 2024-06-18 RX ORDER — LOSARTAN POTASSIUM 100 MG/1
100 TABLET ORAL DAILY
Qty: 30 TABLET | Refills: 1 | Status: SHIPPED | OUTPATIENT
Start: 2024-06-18 | End: 2024-06-21 | Stop reason: SDUPTHER

## 2024-06-18 RX ORDER — MONTELUKAST SODIUM 10 MG/1
10 TABLET ORAL NIGHTLY
Qty: 30 TABLET | Refills: 5 | Status: SHIPPED | OUTPATIENT
Start: 2024-06-18

## 2024-06-18 NOTE — PROGRESS NOTES
Office Note     Name: Skye Barlow    : 1960     MRN: [id number]     Chief Complaint  Shortness of Breath (Follow up/Allergies?/X3 weeks)    Subjective     History of Present Illness:  Skye Barlow is a 64 y.o. female who presents today for follow-up for allergies x 3 weeks.  But taking her Flonase, antihistamine, will add montelukast 10 mg tonight..  And having taken away that lisinopril 20 mg orally week got her on losartan 100 mg orally being the pressure was a little high today    Review of Systems:   Review of Systems    Past Medical History:   Past Medical History:   Diagnosis Date    Anxiety and depression 2024    Anxiety state     Benign essential hypertension     Chronic obstructive lung disease     COPD (chronic obstructive pulmonary disease)     Depressive disorder     Gastroesophageal reflux disease     Migraines     Psoriasis     TMJ (dislocation of temporomandibular joint)     Tobacco abuse        Past Surgical History:   Past Surgical History:   Procedure Laterality Date     SECTION      X3       Family History:   Family History   Problem Relation Age of Onset    Hypertension Mother     Hypertension Father     Dementia Father     Colon cancer Maternal Grandmother     Coronary artery disease Paternal Grandmother     Diabetes Paternal Grandmother     Ovarian cancer Paternal Aunt     Hypertension Other     Diabetes Other     Breast cancer Neg Hx        Social History:   Social History     Socioeconomic History    Marital status:    Tobacco Use    Smoking status: Every Day     Current packs/day: 0.25     Average packs/day: 0.3 packs/day for 43.2 years (10.8 ttl pk-yrs)     Types: Cigarettes     Start date: 4/15/1981     Passive exposure: Current    Smokeless tobacco: Never   Vaping Use    Vaping status: Never Used   Substance and Sexual Activity    Alcohol use: Never    Drug use: Never    Sexual activity: Defer       Immunizations:   Immunization History    Administered Date(s) Administered    COVID-19 (PFIZER) Purple Cap Monovalent 08/31/2021, 10/11/2021    Flu Vaccine Quad PF >36MO 12/09/2019, 10/25/2021    Fluzone (or Fluarix & Flulaval for VFC) >6mos 12/09/2019, 10/25/2021    Fluzone Quad >6mos (Multi-dose) 11/12/2020    Influenza Injectable Mdck Pf Quad 11/08/2022, 10/31/2023    Influenza, Unspecified 11/08/2022    Pneumococcal Conjugate 13-Valent (PCV13) 12/05/2017    Pneumococcal Polysaccharide (PPSV23) 07/09/2019    Tdap 11/25/2014        Medications:     Current Outpatient Medications:     albuterol (PROVENTIL) (2.5 MG/3ML) 0.083% nebulizer solution, Take 2.5 mg by nebulization 2 (Two) Times a Day., Disp: 360 mL, Rfl: 0    albuterol (PROVENTIL) 4 MG tablet, Take 2 tablets by mouth 2 (Two) Times a Day., Disp: , Rfl:     albuterol sulfate  (90 Base) MCG/ACT inhaler, Inhale 2 puffs Every 4 (Four) Hours As Needed for Wheezing., Disp: 8.5 g, Rfl: 1    buPROPion XL (Wellbutrin XL) 300 MG 24 hr tablet, Take 1 tablet by mouth Daily., Disp: 30 tablet, Rfl: 5    famotidine (Pepcid) 40 MG tablet, Take 1 tablet by mouth Daily., Disp: 90 tablet, Rfl: 1    guaiFENesin (MUCINEX) 600 MG 12 hr tablet, Take 1 tablet by mouth Every 12 (Twelve) Hours., Disp: , Rfl:     levocetirizine (XYZAL) 5 MG tablet, Take 1 tablet by mouth Every Evening., Disp: 90 tablet, Rfl: 3    LORazepam (Ativan) 0.5 MG tablet, O.5 - 1.0  q 6 hours prn anxiety, Disp: 60 tablet, Rfl: 1    metoprolol succinate XL (TOPROL-XL) 50 MG 24 hr tablet, Take 1 tablet by mouth Daily., Disp: , Rfl:     rosuvastatin (CRESTOR) 20 MG tablet, Take 1 tablet by mouth Daily., Disp: 90 tablet, Rfl: 3    Spiriva Respimat 2.5 MCG/ACT aerosol solution inhaler, INHALE 2 PUFFS BY MOUTH DAILY, Disp: 4 g, Rfl: 1    Symbicort 160-4.5 MCG/ACT inhaler, Inhale 2 puffs 2 (Two) Times a Day., Disp: , Rfl:     losartan (Cozaar) 100 MG tablet, Take 1 tablet by mouth Daily., Disp: 30 tablet, Rfl: 1    montelukast (Singulair) 10 MG  "tablet, Take 1 tablet by mouth Every Night., Disp: 30 tablet, Rfl: 5    Allergies:   Allergies   Allergen Reactions    Latex Rash    Sulfa Antibiotics Hives    Sulfamethoxazole Rash    Trimethoprim Rash       Objective     Vital Signs  /86   Pulse 90   Ht 144.8 cm (57\")   Wt 65.8 kg (145 lb)   SpO2 98%   BMI 31.38 kg/m²   Estimated body mass index is 31.38 kg/m² as calculated from the following:    Height as of this encounter: 144.8 cm (57\").    Weight as of this encounter: 65.8 kg (145 lb).            Physical Exam  Vitals and nursing note reviewed.   Constitutional:       Appearance: Normal appearance. She is obese.   HENT:      Head: Normocephalic.      Comments: 2-3+ pnd tracts bilat     Right Ear: External ear normal.      Left Ear: External ear normal.      Nose: Nose normal.   Eyes:      Pupils: Pupils are equal, round, and reactive to light.   Neck:      Vascular: No carotid bruit.   Cardiovascular:      Rate and Rhythm: Normal rate and regular rhythm.      Pulses: Normal pulses.      Heart sounds: Normal heart sounds.   Pulmonary:      Effort: Pulmonary effort is normal.      Breath sounds: Normal breath sounds.   Musculoskeletal:      Cervical back: Normal range of motion and neck supple.   Skin:     General: Skin is warm and dry.   Neurological:      Mental Status: She is alert.   Psychiatric:         Mood and Affect: Mood normal.          Procedures     Assessment and Plan     1. Centrilobular emphysema  On 2 puffs of Spiriva 2 puffs of Symbicort 160/4.5 twice daily and montelukast last milligrams at bedtime.,  Would go forth Proventil 4 mg orally 2 times a day and albuterol inhaler and albuterol solution    2. Essential hypertension  Changed lisinopril to losartan recheck in 4 weeks    3. Hyperlipidemia LDL goal <100  Stable    4. Nasal congestion  Nasal congestion being a little better   5. smoker    Follow Up  Return in about 3 months (around 9/18/2024).    Rod Stone MD  [facility] " [facility]  [address]  [facility] [id number] [phone]

## 2024-06-19 DIAGNOSIS — J43.2 CENTRILOBULAR EMPHYSEMA: ICD-10-CM

## 2024-06-19 RX ORDER — ALBUTEROL SULFATE 2.5 MG/3ML
SOLUTION RESPIRATORY (INHALATION)
Qty: 360 ML | Refills: 0 | Status: SHIPPED | OUTPATIENT
Start: 2024-06-19

## 2024-06-20 RX ORDER — METOPROLOL SUCCINATE 50 MG/1
TABLET, EXTENDED RELEASE ORAL DAILY
Qty: 90 TABLET | OUTPATIENT
Start: 2024-06-20

## 2024-06-21 ENCOUNTER — OFFICE VISIT (OUTPATIENT)
Dept: CARDIOLOGY | Facility: CLINIC | Age: 64
End: 2024-06-21
Payer: MEDICARE

## 2024-06-21 VITALS
SYSTOLIC BLOOD PRESSURE: 140 MMHG | BODY MASS INDEX: 29.99 KG/M2 | HEIGHT: 57 IN | HEART RATE: 94 BPM | WEIGHT: 139 LBS | RESPIRATION RATE: 16 BRPM | OXYGEN SATURATION: 95 % | DIASTOLIC BLOOD PRESSURE: 72 MMHG

## 2024-06-21 DIAGNOSIS — E78.5 HYPERLIPIDEMIA LDL GOAL <100: ICD-10-CM

## 2024-06-21 DIAGNOSIS — E78.2 MIXED HYPERLIPIDEMIA: ICD-10-CM

## 2024-06-21 DIAGNOSIS — I10 ESSENTIAL HYPERTENSION: Primary | ICD-10-CM

## 2024-06-21 RX ORDER — ROSUVASTATIN CALCIUM 20 MG/1
20 TABLET, COATED ORAL DAILY
Qty: 90 TABLET | Refills: 3 | Status: SHIPPED | OUTPATIENT
Start: 2024-06-21

## 2024-06-21 RX ORDER — LOSARTAN POTASSIUM 100 MG/1
100 TABLET ORAL DAILY
Qty: 90 TABLET | Refills: 3 | Status: SHIPPED | OUTPATIENT
Start: 2024-06-21

## 2024-06-21 RX ORDER — METOPROLOL SUCCINATE 50 MG/1
50 TABLET, EXTENDED RELEASE ORAL DAILY
Qty: 90 TABLET | Refills: 3 | Status: SHIPPED | OUTPATIENT
Start: 2024-06-21

## 2024-06-21 NOTE — PROGRESS NOTES
[facility] [place]  [facility] CARDIOLOGY  [address] [place] [address]  Dept: [phone]  Dept Fax: [phone]    Skye Barlow  1960    Follow Up Office Visit Note    History of Present Illness:  Skye Barlow is a 64 y.o. female who presents to the clinic for Hypertension. Hypertension- She denies any CP, palpitations, has SOB on walking has COPD and uses oxygenm, BP is 130,80 on Losartan 100 mg and also Toprol xl 50 mg ,plus Crestor, tolerates well EKG sinus HR 92    The following portions of the patient's history were reviewed and updated as appropriate: allergies, current medications, past family history, past medical history, past social history, past surgical history, and problem list.    Medications:  albuterol  albuterol sulfate HFA  buPROPion XL  famotidine  guaiFENesin  levocetirizine  LORazepam  losartan  metoprolol succinate XL  montelukast  rosuvastatin  Spiriva Respimat aerosol solution  Symbicort aerosol    Subjective  Allergies   Allergen Reactions   • Latex Rash   • Sulfa Antibiotics Hives   • Sulfamethoxazole Rash   • Trimethoprim Rash        Past Medical History:   Diagnosis Date   • Anxiety and depression 2024   • Anxiety state    • Benign essential hypertension    • Chronic obstructive lung disease    • COPD (chronic obstructive pulmonary disease)    • Depressive disorder    • Gastroesophageal reflux disease    • Migraines    • Psoriasis    • TMJ (dislocation of temporomandibular joint)    • Tobacco abuse        Past Surgical History:   Procedure Laterality Date   •  SECTION      X3       Family History   Problem Relation Age of Onset   • Hypertension Mother    • Hypertension Father    • Dementia Father    • Colon cancer Maternal Grandmother    • Coronary artery disease Paternal Grandmother    • Diabetes Paternal Grandmother    • Ovarian cancer Paternal Aunt    • Hypertension Other    • Diabetes Other    • Breast cancer Neg Hx      "    Social History     Socioeconomic History   • Marital status:    Tobacco Use   • Smoking status: Every Day     Current packs/day: 0.25     Average packs/day: 0.3 packs/day for 43.2 years (10.8 ttl pk-yrs)     Types: Cigarettes     Start date: 4/15/1981     Passive exposure: Current   • Smokeless tobacco: Never   • Tobacco comments:     Down to 2-3 cigarettes a day    Vaping Use   • Vaping status: Never Used   Substance and Sexual Activity   • Alcohol use: Never   • Drug use: Never   • Sexual activity: Defer       Review of Systems   Constitutional: Negative.    HENT: Negative.     Respiratory: Negative.  Positive for shortness of breath.    Cardiovascular: Negative.    Endocrine: Negative.    Genitourinary: Negative.    Musculoskeletal: Negative.    Skin: Negative.    Allergic/Immunologic: Negative.    Neurological: Negative.    Hematological: Negative.    Psychiatric/Behavioral: Negative.       Cardiovascular Procedures    ECHO/MUGA:  STRESS TESTS:   CARDIAC CATH:   DEVICES:   HOLTER:   CT/MRI:   VASCULAR:   CARDIOTHORACIC:     Objective  Vitals:    06/21/24 1041   BP: 140/72   BP Location: Right arm   Patient Position: Lying   Cuff Size: Adult   Pulse: 94   Resp: 16   SpO2: 95%   Weight: 63 kg (139 lb)   Height: 144.8 cm (57\")   PainSc: 0-No pain     Body mass index is 30.08 kg/m².     Physical Exam  Vitals reviewed.   Constitutional:       Appearance: Healthy appearance. Not in distress.   Neck:      Vascular: No JVR. JVD normal.   Pulmonary:      Effort: Pulmonary effort is normal.      Breath sounds: Normal breath sounds. No wheezing. No rhonchi. No rales.   Chest:      Chest wall: Not tender to palpatation.   Cardiovascular:      PMI at left midclavicular line. Normal rate. Regular rhythm. Normal S1. Normal S2.       Murmurs: There is no murmur.      No gallop.  No click. No rub.   Pulses:     Intact distal pulses.   Edema:     Peripheral edema absent.   Abdominal:      General: Bowel sounds are " normal.      Palpations: Abdomen is soft.      Tenderness: There is no abdominal tenderness.   Musculoskeletal: Normal range of motion.         General: No tenderness. Skin:     General: Skin is warm and dry.   Neurological:      General: No focal deficit present.      Mental Status: Alert and oriented to person, place and time.        Diagnostic Data    ECG 12 Lead    Date/Time: 6/21/2024 11:16 AM  Performed by: Andre Burt MD    Authorized by: Andre Burt MD  Comparison: compared with previous ECG from 3/9/2023  Similar to previous ECG  Rhythm: sinus rhythm  Rate: normal  BPM: 92  QRS axis: normal    Clinical impression: normal ECG        Assessment and Plan  Diagnoses and all orders for this visit:    Essential hypertension- BP  is 130.80, on Losartan 100 mg and Toprol xl 50 mg, no complaints     Mixed hyperlipidemia- On Crestor we need a Lipid profile, tolerates well  -     rosuvastatin (CRESTOR) 20 MG tablet; Take 1 tablet by mouth Daily.  COPD- on oxygen per Dr Tubbs        Other orders  -     metoprolol succinate XL (TOPROL-XL) 50 MG 24 hr tablet; Take 1 tablet by mouth Daily.  -     losartan (Cozaar) 100 MG tablet; Take 1 tablet by mouth Daily.         Return in about 6 months (around 12/21/2024) for Recheck with Dr. Burt.    Andre Burt MD  06/21/2024

## 2024-06-25 DIAGNOSIS — J43.2 CENTRILOBULAR EMPHYSEMA: ICD-10-CM

## 2024-07-02 DIAGNOSIS — J43.2 CENTRILOBULAR EMPHYSEMA: ICD-10-CM

## 2024-07-02 RX ORDER — ALBUTEROL SULFATE 90 UG/1
2 AEROSOL, METERED RESPIRATORY (INHALATION) EVERY 4 HOURS PRN
Qty: 8.5 G | Refills: 1 | OUTPATIENT
Start: 2024-07-02

## 2024-07-03 RX ORDER — ALBUTEROL SULFATE 2.5 MG/3ML
SOLUTION RESPIRATORY (INHALATION)
Qty: 360 ML | Refills: 0 | OUTPATIENT
Start: 2024-07-03

## 2024-07-03 RX ORDER — ALBUTEROL SULFATE 90 UG/1
2 AEROSOL, METERED RESPIRATORY (INHALATION) EVERY 4 HOURS PRN
Qty: 8.5 G | Refills: 1 | Status: SHIPPED | OUTPATIENT
Start: 2024-07-03

## 2024-07-03 NOTE — TELEPHONE ENCOUNTER
CALLED TO CHECK STATUS FOR PT RESCUE INHALER,, STATED THAT PT HAS COPD    PLEASE ADVISE.    [id number] [place], [place] - [address] [facility] [address] [facility] [phone] [facility] [phone] FX

## 2024-07-09 ENCOUNTER — TELEPHONE (OUTPATIENT)
Dept: FAMILY MEDICINE CLINIC | Facility: CLINIC | Age: 64
End: 2024-07-09
Payer: MEDICARE

## 2024-07-09 NOTE — TELEPHONE ENCOUNTER
Caller: WES DE LA CRUZ    Relationship: Emergency Contact    Best call back number: [phone]    What orders are you requesting (i.e. lab or imaging): NEW NEBULIZER MACHINE.     In what timeframe would the patient need to come in: ASAP.    Where will you receive your lab/imaging services: [facility]   PH: [phone]    Additional notes: PATIENT'S MACHINE IS CRACKED AND NOT WORKING.

## 2024-07-09 NOTE — TELEPHONE ENCOUNTER
Dr zarate wrote out order. Printed last office visit note and pt demo and will be faxed to [facility]. [phone]

## 2024-07-18 DIAGNOSIS — F06.4 ANXIETY DISORDER DUE TO GENERAL MEDICAL CONDITION: ICD-10-CM

## 2024-07-19 ENCOUNTER — OFFICE VISIT (OUTPATIENT)
Dept: FAMILY MEDICINE CLINIC | Facility: CLINIC | Age: 64
End: 2024-07-19
Payer: MEDICARE

## 2024-07-19 DIAGNOSIS — J02.9 SORE THROAT: ICD-10-CM

## 2024-07-19 DIAGNOSIS — J06.9 ACUTE URI: Primary | ICD-10-CM

## 2024-07-19 PROCEDURE — 1126F AMNT PAIN NOTED NONE PRSNT: CPT | Performed by: FAMILY MEDICINE

## 2024-07-19 PROCEDURE — 3078F DIAST BP <80 MM HG: CPT | Performed by: FAMILY MEDICINE

## 2024-07-19 PROCEDURE — 99213 OFFICE O/P EST LOW 20 MIN: CPT | Performed by: FAMILY MEDICINE

## 2024-07-19 PROCEDURE — 3074F SYST BP LT 130 MM HG: CPT | Performed by: FAMILY MEDICINE

## 2024-07-19 RX ORDER — LORAZEPAM 0.5 MG/1
TABLET ORAL
Qty: 30 TABLET | Refills: 1 | Status: SHIPPED | OUTPATIENT
Start: 2024-07-19

## 2024-07-22 VITALS
DIASTOLIC BLOOD PRESSURE: 78 MMHG | OXYGEN SATURATION: 94 % | HEART RATE: 89 BPM | WEIGHT: 140 LBS | HEIGHT: 57 IN | SYSTOLIC BLOOD PRESSURE: 118 MMHG | BODY MASS INDEX: 30.2 KG/M2

## 2024-07-23 NOTE — PROGRESS NOTES
Office Note     Name: Skye Barlow    : 1960     MRN: [id number]     Chief Complaint  COPD (Follow up)    Subjective     History of Present Illness:  Skye Barlow is a 64 y.o. female who presents today for 2 days of s.t., horseness, and a cough but breathing better since she has quit smoking 7 days    Review of Systems:   Review of Systems    Past Medical History:   Past Medical History:   Diagnosis Date    Anxiety and depression 2024    Anxiety state     Benign essential hypertension     Chronic obstructive lung disease     COPD (chronic obstructive pulmonary disease)     Depressive disorder     Gastroesophageal reflux disease     Migraines     Psoriasis     TMJ (dislocation of temporomandibular joint)     Tobacco abuse        Past Surgical History:   Past Surgical History:   Procedure Laterality Date     SECTION      X3       Family History:   Family History   Problem Relation Age of Onset    Hypertension Mother     Hypertension Father     Dementia Father     Colon cancer Maternal Grandmother     Coronary artery disease Paternal Grandmother     Diabetes Paternal Grandmother     Ovarian cancer Paternal Aunt     Hypertension Other     Diabetes Other     Breast cancer Neg Hx        Social History:   Social History     Socioeconomic History    Marital status:    Tobacco Use    Smoking status: Every Day     Current packs/day: 0.25     Average packs/day: 0.3 packs/day for 43.3 years (10.8 ttl pk-yrs)     Types: Cigarettes     Start date: 4/15/1981     Passive exposure: Current    Smokeless tobacco: Never    Tobacco comments:     Down to 2-3 cigarettes a day    Vaping Use    Vaping status: Never Used   Substance and Sexual Activity    Alcohol use: Never    Drug use: Never    Sexual activity: Defer       Immunizations:   Immunization History   Administered Date(s) Administered    COVID-19 (PFIZER) Purple Cap Monovalent 2021, 10/11/2021    Flu Vaccine Quad PF >36MO 2019,  10/25/2021    Fluzone (or Fluarix & Flulaval for VFC) >6mos 12/09/2019, 10/25/2021    Fluzone Quad >6mos (Multi-dose) 11/12/2020    Influenza Injectable Mdck Pf Quad 11/08/2022, 10/31/2023    Influenza, Unspecified 11/08/2022    Pneumococcal Conjugate 13-Valent (PCV13) 12/05/2017    Pneumococcal Polysaccharide (PPSV23) 07/09/2019    Tdap 11/25/2014        Medications:     Current Outpatient Medications:     albuterol (PROVENTIL) (2.5 MG/3ML) 0.083% nebulizer solution, INHALE 1 VIAL VIA NEBLULIZATION TWICE DAILY, Disp: 360 mL, Rfl: 0    albuterol (PROVENTIL) 4 MG tablet, Take 2 tablets by mouth 2 (Two) Times a Day., Disp: , Rfl:     albuterol sulfate  (90 Base) MCG/ACT inhaler, Inhale 2 puffs Every 4 (Four) Hours As Needed for Wheezing., Disp: 8.5 g, Rfl: 1    buPROPion XL (Wellbutrin XL) 300 MG 24 hr tablet, Take 1 tablet by mouth Daily., Disp: 30 tablet, Rfl: 5    famotidine (Pepcid) 40 MG tablet, Take 1 tablet by mouth Daily., Disp: 90 tablet, Rfl: 1    guaiFENesin (MUCINEX) 600 MG 12 hr tablet, Take 1 tablet by mouth Every 12 (Twelve) Hours., Disp: , Rfl:     levocetirizine (XYZAL) 5 MG tablet, Take 1 tablet by mouth Every Evening., Disp: 90 tablet, Rfl: 3    LORazepam (ATIVAN) 0.5 MG tablet, TAKE 1 TABLET BY MOUTH EVERY 8 HOURS AS NEEDED FOR ANXIETY, Disp: 30 tablet, Rfl: 1    losartan (Cozaar) 100 MG tablet, Take 1 tablet by mouth Daily., Disp: 90 tablet, Rfl: 3    metoprolol succinate XL (TOPROL-XL) 50 MG 24 hr tablet, Take 1 tablet by mouth Daily., Disp: 90 tablet, Rfl: 3    montelukast (Singulair) 10 MG tablet, Take 1 tablet by mouth Every Night., Disp: 30 tablet, Rfl: 5    rosuvastatin (CRESTOR) 20 MG tablet, Take 1 tablet by mouth Daily., Disp: 90 tablet, Rfl: 3    Spiriva Respimat 2.5 MCG/ACT aerosol solution inhaler, INHALE 2 PUFFS BY MOUTH DAILY, Disp: 4 g, Rfl: 1    Symbicort 160-4.5 MCG/ACT inhaler, Inhale 2 puffs 2 (Two) Times a Day., Disp: , Rfl:     Allergies:   Allergies   Allergen  "Reactions    Latex Rash    Sulfa Antibiotics Hives    Sulfamethoxazole Rash    Trimethoprim Rash       Objective     Vital Signs  /78   Pulse 89   Ht 144.8 cm (57\")   Wt 63.5 kg (140 lb)   SpO2 94%   BMI 30.30 kg/m²   Estimated body mass index is 30.3 kg/m² as calculated from the following:    Height as of this encounter: 144.8 cm (57\").    Weight as of this encounter: 63.5 kg (140 lb).            Physical Exam  Vitals and nursing note reviewed.   Constitutional:       Appearance: Normal appearance. She is normal weight.   HENT:      Head: Normocephalic.      Right Ear: External ear normal.      Left Ear: External ear normal.      Nose: Nose normal.   Eyes:      Pupils: Pupils are equal, round, and reactive to light.   Neck:      Vascular: No carotid bruit.   Cardiovascular:      Rate and Rhythm: Normal rate and regular rhythm.      Pulses: Normal pulses.      Heart sounds: Normal heart sounds.   Pulmonary:      Effort: Pulmonary effort is normal.      Breath sounds: Normal breath sounds.   Musculoskeletal:      Cervical back: Normal range of motion and neck supple.   Skin:     General: Skin is warm and dry.   Neurological:      Mental Status: She is alert.   Psychiatric:         Mood and Affect: Mood normal.          Procedures     Assessment and Plan     1. Acute URI  Most  likely a virus.    2. Sore throat         Follow Up  Return if symptoms worsen or fail to improve.    Rod VIVAR [facility] PRIMARY [facility]ARE  [address] [place] [id number] [phone]  "

## 2024-08-05 RX ORDER — FAMOTIDINE 40 MG/1
40 TABLET, FILM COATED ORAL DAILY
Qty: 90 TABLET | Refills: 1 | Status: SHIPPED | OUTPATIENT
Start: 2024-08-05

## 2024-09-03 RX ORDER — TIOTROPIUM BROMIDE INHALATION SPRAY 3.12 UG/1
SPRAY, METERED RESPIRATORY (INHALATION)
Qty: 4 G | Refills: 1 | Status: SHIPPED | OUTPATIENT
Start: 2024-09-03

## 2024-09-04 ENCOUNTER — OFFICE VISIT (OUTPATIENT)
Dept: FAMILY MEDICINE CLINIC | Facility: CLINIC | Age: 64
End: 2024-09-04
Payer: MEDICARE

## 2024-09-04 VITALS
HEART RATE: 82 BPM | HEIGHT: 57 IN | DIASTOLIC BLOOD PRESSURE: 78 MMHG | OXYGEN SATURATION: 96 % | SYSTOLIC BLOOD PRESSURE: 138 MMHG | WEIGHT: 150 LBS | BODY MASS INDEX: 32.36 KG/M2

## 2024-09-04 DIAGNOSIS — F06.4 ANXIETY DISORDER DUE TO GENERAL MEDICAL CONDITION: ICD-10-CM

## 2024-09-04 DIAGNOSIS — Z79.899 HIGH RISK MEDICATION USE: Primary | ICD-10-CM

## 2024-09-04 LAB
AMPHET+METHAMPHET UR QL: NEGATIVE
AMPHETAMINE INTERNAL CONTROL: ABNORMAL
AMPHETAMINES UR QL: NEGATIVE
BARBITURATE INTERNAL CONTROL: ABNORMAL
BARBITURATES UR QL SCN: NEGATIVE
BENZODIAZ UR QL SCN: POSITIVE
BENZODIAZEPINE INTERNAL CONTROL: ABNORMAL
BUPRENORPHINE INTERNAL CONTROL: ABNORMAL
BUPRENORPHINE SERPL-MCNC: NEGATIVE NG/ML
CANNABINOIDS SERPL QL: NEGATIVE
COCAINE INTERNAL CONTROL: ABNORMAL
COCAINE UR QL: NEGATIVE
EDDP INTERNAL CONTROL: ABNORMAL
EDDP UR QL SCN: NEGATIVE
MDMA (ECSTASY) INTERNAL CONTROL: ABNORMAL
MDMA UR QL SCN: NEGATIVE
METHADONE INTERNAL CONTROL: ABNORMAL
METHADONE UR QL SCN: NEGATIVE
METHAMPHETAMINE INTERNAL CONTROL: ABNORMAL
MORPHINE INTERNAL CONTROL: ABNORMAL
MORPHINE UR QL SCN: NEGATIVE
OXYCODONE INTERNAL CONTROL: ABNORMAL
OXYCODONE UR QL SCN: NEGATIVE
PCP UR QL SCN: NEGATIVE
PHENCYCLIDINE INTERNAL CONTROL: ABNORMAL
PROPOXYPH UR QL SCN: NEGATIVE
PROPOXYPHENE INTERNAL CONTROL: ABNORMAL
THC INTERNAL CONTROL: ABNORMAL
TRICYCLIC ANTIDEPRESSANTS INTERNAL CONTROL: ABNORMAL
TRICYCLICS UR QL SCN: NEGATIVE

## 2024-09-04 PROCEDURE — 3078F DIAST BP <80 MM HG: CPT | Performed by: FAMILY MEDICINE

## 2024-09-04 PROCEDURE — 3075F SYST BP GE 130 - 139MM HG: CPT | Performed by: FAMILY MEDICINE

## 2024-09-04 PROCEDURE — 1160F RVW MEDS BY RX/DR IN RCRD: CPT | Performed by: FAMILY MEDICINE

## 2024-09-04 PROCEDURE — 1159F MED LIST DOCD IN RCRD: CPT | Performed by: FAMILY MEDICINE

## 2024-09-04 PROCEDURE — 1126F AMNT PAIN NOTED NONE PRSNT: CPT | Performed by: FAMILY MEDICINE

## 2024-09-04 PROCEDURE — 99214 OFFICE O/P EST MOD 30 MIN: CPT | Performed by: FAMILY MEDICINE

## 2024-09-04 RX ORDER — LORAZEPAM 0.5 MG/1
TABLET ORAL
Qty: 30 TABLET | Refills: 2 | Status: SHIPPED | OUTPATIENT
Start: 2024-09-04

## 2024-09-04 NOTE — PROGRESS NOTES
Office Note     Name: Skye Barlow    : 1960     MRN: [id number]     Chief Complaint  Med Refill    Subjective     History of Present Illness:  Skye Barlow is a 64 y.o. female who presents today for anxiety, hypertension, COPD, quit smoking for 2 months.!!!  Did not have any trouble with the COVID-19 when she got it.  Left her to vent her frustrations and me for having 3 daughters.    Review of Systems:   Review of Systems    Past Medical History:   Past Medical History:   Diagnosis Date    Anxiety and depression 2024    Anxiety state     Benign essential hypertension     Chronic obstructive lung disease     COPD (chronic obstructive pulmonary disease)     Depressive disorder     Gastroesophageal reflux disease     Migraines     Psoriasis     TMJ (dislocation of temporomandibular joint)     Tobacco abuse        Past Surgical History:   Past Surgical History:   Procedure Laterality Date     SECTION      X3       Family History:   Family History   Problem Relation Age of Onset    Hypertension Mother     Hypertension Father     Dementia Father     Colon cancer Maternal Grandmother     Coronary artery disease Paternal Grandmother     Diabetes Paternal Grandmother     Ovarian cancer Paternal Aunt     Hypertension Other     Diabetes Other     Breast cancer Neg Hx        Social History:   Social History     Socioeconomic History    Marital status:    Tobacco Use    Smoking status: Every Day     Current packs/day: 0.25     Average packs/day: 0.3 packs/day for 43.4 years (10.8 ttl pk-yrs)     Types: Cigarettes     Start date: 4/15/1981     Passive exposure: Current    Smokeless tobacco: Never    Tobacco comments:     Down to 2-3 cigarettes a day    Vaping Use    Vaping status: Never Used   Substance and Sexual Activity    Alcohol use: Never    Drug use: Never    Sexual activity: Defer       Immunizations:   Immunization History   Administered Date(s) Administered    COVID-19 (PFIZER)  Purple Cap Monovalent 08/31/2021, 10/11/2021    Flu Vaccine Quad PF >36MO 12/09/2019, 10/25/2021    Fluzone (or Fluarix & Flulaval for VFC) >6mos 12/09/2019, 10/25/2021    Fluzone Quad >6mos (Multi-dose) 11/12/2020    Influenza Injectable Mdck Pf Quad 11/08/2022, 10/31/2023    Influenza, Unspecified 11/08/2022    Pneumococcal Conjugate 13-Valent (PCV13) 12/05/2017    Pneumococcal Polysaccharide (PPSV23) 07/09/2019    Tdap 11/25/2014        Medications:     Current Outpatient Medications:     albuterol (PROVENTIL) 4 MG tablet, Take 2 tablets by mouth 2 (Two) Times a Day., Disp: , Rfl:     albuterol sulfate  (90 Base) MCG/ACT inhaler, Inhale 2 puffs Every 4 (Four) Hours As Needed for Wheezing., Disp: 8.5 g, Rfl: 1    buPROPion XL (Wellbutrin XL) 300 MG 24 hr tablet, Take 1 tablet by mouth Daily., Disp: 30 tablet, Rfl: 5    famotidine (PEPCID) 40 MG tablet, TAKE 1 TABLET BY MOUTH DAILY, Disp: 90 tablet, Rfl: 1    guaiFENesin (MUCINEX) 600 MG 12 hr tablet, Take 1 tablet by mouth Every 12 (Twelve) Hours., Disp: , Rfl:     LORazepam (ATIVAN) 0.5 MG tablet, TAKE 1 TABLET BY MOUTH EVERY 8 HOURS AS NEEDED FOR ANXIETY, Disp: 30 tablet, Rfl: 2    losartan (Cozaar) 100 MG tablet, Take 1 tablet by mouth Daily., Disp: 90 tablet, Rfl: 3    metoprolol succinate XL (TOPROL-XL) 50 MG 24 hr tablet, Take 1 tablet by mouth Daily., Disp: 90 tablet, Rfl: 3    montelukast (Singulair) 10 MG tablet, Take 1 tablet by mouth Every Night., Disp: 30 tablet, Rfl: 5    rosuvastatin (CRESTOR) 20 MG tablet, Take 1 tablet by mouth Daily., Disp: 90 tablet, Rfl: 3    Spiriva Respimat 2.5 MCG/ACT aerosol solution inhaler, INHALE 2 PUFFS BY MOUTH DAILY, Disp: 4 g, Rfl: 1    Symbicort 160-4.5 MCG/ACT inhaler, Inhale 2 puffs 2 (Two) Times a Day., Disp: , Rfl:     Allergies:   Allergies   Allergen Reactions    Latex Rash    Sulfa Antibiotics Hives    Sulfamethoxazole Rash    Trimethoprim Rash       Objective     Vital Signs  /78   Pulse 82    "Ht 144.8 cm (57\")   Wt 68 kg (150 lb)   SpO2 96%   BMI 32.46 kg/m²   Estimated body mass index is 32.46 kg/m² as calculated from the following:    Height as of this encounter: 144.8 cm (57\").    Weight as of this encounter: 68 kg (150 lb).            Physical Exam  Constitutional:       General: She is not in acute distress.     Appearance: Normal appearance. She is obese. She is not toxic-appearing or diaphoretic.   HENT:      Head: Normocephalic and atraumatic.      Right Ear: External ear normal.      Left Ear: External ear normal.      Nose: Nose normal.   Eyes:      Pupils: Pupils are equal, round, and reactive to light.   Skin:     General: Skin is warm and dry.   Neurological:      Mental Status: She is alert.   Psychiatric:         Mood and Affect: Mood normal.         Behavior: Behavior normal.          Procedures     Assessment and Plan     1. High risk medication use    -  POC Urine Drug Screen    2. Anxiety disorder due to general medical condition  Refill of lorazepam for 3 months.  Listen to her and make the chart 30 minutes  - LORazepam (ATIVAN) 0.5 MG tablet; TAKE 1 TABLET BY MOUTH EVERY 8 HOURS AS NEEDED FOR ANXIETY  Dispense: 30 tablet; Refill: 2       Follow Up  Return in about 3 months (around 12/4/2024).    Rod VIVAR [facility]IMARY [facility]  [address] [place] [id number] [phone]  "

## 2024-09-30 ENCOUNTER — OFFICE VISIT (OUTPATIENT)
Dept: FAMILY MEDICINE CLINIC | Facility: CLINIC | Age: 64
End: 2024-09-30
Payer: MEDICARE

## 2024-09-30 VITALS
SYSTOLIC BLOOD PRESSURE: 120 MMHG | HEART RATE: 71 BPM | HEIGHT: 57 IN | DIASTOLIC BLOOD PRESSURE: 64 MMHG | WEIGHT: 148 LBS | BODY MASS INDEX: 31.93 KG/M2 | OXYGEN SATURATION: 96 %

## 2024-09-30 DIAGNOSIS — M79.602 LEFT ARM PAIN: ICD-10-CM

## 2024-09-30 DIAGNOSIS — F32.A ANXIETY AND DEPRESSION: ICD-10-CM

## 2024-09-30 DIAGNOSIS — F41.9 ANXIETY AND DEPRESSION: ICD-10-CM
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
## 2024-09-30 DIAGNOSIS — F51.8 ABNORMAL DREAMS: ICD-10-CM
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
## 2024-09-30 PROCEDURE — 1126F AMNT PAIN NOTED NONE PRSNT: CPT | Performed by: NURSE PRACTITIONER

## 2024-09-30 PROCEDURE — 1160F RVW MEDS BY RX/DR IN RCRD: CPT | Performed by: NURSE PRACTITIONER

## 2024-09-30 PROCEDURE — 3074F SYST BP LT 130 MM HG: CPT | Performed by: NURSE PRACTITIONER

## 2024-09-30 PROCEDURE — 3078F DIAST BP <80 MM HG: CPT | Performed by: NURSE PRACTITIONER

## 2024-09-30 PROCEDURE — 99213 OFFICE O/P EST LOW 20 MIN: CPT | Performed by: NURSE PRACTITIONER

## 2024-09-30 PROCEDURE — 1159F MED LIST DOCD IN RCRD: CPT | Performed by: NURSE PRACTITIONER

## 2024-09-30 RX ORDER — BUPROPION HYDROCHLORIDE 150 MG/1
150 TABLET ORAL DAILY
Qty: 30 TABLET | Refills: 0 | Status: SHIPPED | OUTPATIENT
Start: 2024-09-30

## 2024-09-30 NOTE — PROGRESS NOTES
Office Note     Name: Skye Barlow    : 1960     MRN: [id number]     Chief Complaint  Arm Injury (Pt states she had fallen into a door and bruised her left arm. ), Fatigue (Pt states she has been very tired and sleepy ), and Nausea (Pt states she had felt sick after falling )    Subjective     History of Present Illness:  Skye Barlow is a 64 y.o. female who presents today for left arm pain.     History of Present Illness  The patient presents for evaluation of multiple medical concerns.    She reports a recent fall in her bathroom, during which she experienced dizziness and hit her arm on the door. She fell last Thursday and has been experiencing nausea intermittently since then, seems to be associated with pain in her arm. She mentions a change in her blood pressure medication from lisinopril to losartan but cannot recall the exact timing of this change. She has not had any lab tests since 2024. She is currently undergoing pulmonary rehabilitation therapy.    She has been experiencing vivid dreams since starting Wellbutrin, which she takes in the morning. These dreams often involve her being held hostage or driving long distances without her phone or purse. She also reports feeling excessively tired and needing to nap during the day.    She has a known torn rotator cuff in her left arm, which causes her pain and occasional numbness. She describes the pain as a grinding sensation, particularly when moving her arm or when it is touched. She also experiences sharp pain at times.    SOCIAL HISTORY  The patient is trying to quit smoking.      Objective     Past Medical History:   Diagnosis Date    Anxiety and depression 2024    Anxiety state     Benign essential hypertension     Chronic obstructive lung disease     COPD (chronic obstructive pulmonary disease)     Depressive disorder     Gastroesophageal reflux disease     Migraines     Psoriasis     TMJ (dislocation of  "temporomandibular joint)     Tobacco abuse      Past Surgical History:   Procedure Laterality Date     SECTION      X3     Family History   Problem Relation Age of Onset    Hypertension Mother     Hypertension Father     Dementia Father     Colon cancer Maternal Grandmother     Coronary artery disease Paternal Grandmother     Diabetes Paternal Grandmother     Ovarian cancer Paternal Aunt     Hypertension Other     Diabetes Other     Breast cancer Neg Hx        Vital Signs  /64 (BP Location: Left arm, Patient Position: Sitting)   Pulse 71   Ht 144.8 cm (57\")   Wt 67.1 kg (148 lb)   SpO2 96%   BMI 32.03 kg/m²   Estimated body mass index is 32.03 kg/m² as calculated from the following:    Height as of this encounter: 144.8 cm (57\").    Weight as of this encounter: 67.1 kg (148 lb).    Physical Exam  Vitals reviewed.   Constitutional:       Appearance: Normal appearance.   Cardiovascular:      Rate and Rhythm: Normal rate and regular rhythm.      Heart sounds: Normal heart sounds.   Pulmonary:      Effort: Pulmonary effort is normal.      Breath sounds: Normal breath sounds.   Musculoskeletal:      Left shoulder: Tenderness (and ecchymosis to lateral aspect of shoulder) present. No swelling, deformity or effusion. Normal range of motion.      Left upper arm: Tenderness (and ecchymosis noted to lateral aspect of upper arm) present. No swelling, edema, deformity or lacerations.   Skin:     General: Skin is warm and dry.      Capillary Refill: Capillary refill takes less than 2 seconds.   Neurological:      General: No focal deficit present.      Mental Status: She is alert and oriented to person, place, and time.   Psychiatric:         Mood and Affect: Mood normal.         Behavior: Behavior normal.        Physical Exam  Vital Signs  Blood pressure reading today is 120/64.    Results         Assessment and Plan     Diagnoses and all orders for this visit:    1. Fall in home, initial encounter " (Primary)    2. Left arm pain  -     XR Shoulder 2+ View Left (In Office)  -     XR Humerus Left (In Office)    3. Abnormal dreams    4. Anxiety and depression  -     buPROPion XL (Wellbutrin XL) 150 MG 24 hr tablet; Take 1 tablet by mouth Daily.  Dispense: 30 tablet; Refill: 0      Assessment & Plan  1. Left arm pain.  Given her known rotator cuff tear, any trauma to the area could exacerbate the condition. The reported grinding sensation is not near the rotator cuff, suggesting it may not be related to the rotator cuff. The bruising observed is essentially a pocket of blood under the skin's surface. An x-ray of the shoulder and arm will be conducted to rule out any fractures. She will be contacted with the results.    2. Vivid dreams.  The vivid dreams could potentially be a side effect of Wellbutrin, which she has been taking for at least 3 months. A reduction in the Wellbutrin dosage from 300 mg to 150 mg has been recommended. A new prescription for 150 mg has been sent to [facility]. A follow-up appointment with Dr. Stone is advised within the next month.    3. Fatigue.  She reports feeling tired all the time, which could be related to her vivid dreams and disrupted sleep. Monitoring her symptoms after the reduction in Wellbutrin dosage is recommended. If symptoms persist, further evaluation may be necessary.    4. Nausea.  She reports experiencing nausea after her fall. Monitoring her symptoms and ensuring adequate hydration is recommended. If nausea persists, further evaluation may be necessary.    Follow-up  Return in 3 to 4 weeks for follow up.         Follow Up  3-4 weeks with Dr. Stone to discuss further weaning from Wellbutrin XL      Patient or patient representative verbalized consent for the use of Ambient Listening during the visit with  RICKEY Barrett for chart documentation. 9/30/2024  14:21 EDT    RICKEY Barrett

## 2024-10-14 ENCOUNTER — OFFICE VISIT (OUTPATIENT)
Dept: FAMILY MEDICINE CLINIC | Facility: CLINIC | Age: 64
End: 2024-10-14
Payer: MEDICARE

## 2024-10-14 VITALS — HEIGHT: 57 IN | BODY MASS INDEX: 32.36 KG/M2 | OXYGEN SATURATION: 98 % | WEIGHT: 150 LBS

## 2024-10-14 DIAGNOSIS — R42 DIZZY: ICD-10-CM

## 2024-10-14 DIAGNOSIS — I10 ESSENTIAL HYPERTENSION: Primary | ICD-10-CM

## 2024-10-14 DIAGNOSIS — F41.9 ANXIETY AND DEPRESSION: ICD-10-CM

## 2024-10-14 DIAGNOSIS — F17.211 NICOTINE DEPENDENCE, CIGARETTES, IN REMISSION: ICD-10-CM

## 2024-10-14 DIAGNOSIS — F32.A ANXIETY AND DEPRESSION: ICD-10-CM

## 2024-10-14 DIAGNOSIS — Z12.2 SCREENING FOR LUNG CANCER: ICD-10-CM

## 2024-10-14 PROCEDURE — 1160F RVW MEDS BY RX/DR IN RCRD: CPT | Performed by: FAMILY MEDICINE

## 2024-10-14 PROCEDURE — 99214 OFFICE O/P EST MOD 30 MIN: CPT | Performed by: FAMILY MEDICINE

## 2024-10-14 PROCEDURE — 1126F AMNT PAIN NOTED NONE PRSNT: CPT | Performed by: FAMILY MEDICINE

## 2024-10-14 PROCEDURE — 1159F MED LIST DOCD IN RCRD: CPT | Performed by: FAMILY MEDICINE

## 2024-10-14 RX ORDER — METOPROLOL SUCCINATE 50 MG/1
50 TABLET, EXTENDED RELEASE ORAL NIGHTLY
Qty: 90 TABLET | Refills: 3 | Status: SHIPPED | OUTPATIENT
Start: 2024-10-14

## 2024-10-14 RX ORDER — BUPROPION HYDROCHLORIDE 300 MG/1
300 TABLET ORAL DAILY
Qty: 90 TABLET | Refills: 1 | Status: SHIPPED | OUTPATIENT
Start: 2024-10-14

## 2024-10-14 NOTE — PROGRESS NOTES
Office Note     Name: Skye Barlow    : 1960     MRN: [id number]     Chief Complaint  Dizziness (Weakness,tired)    Subjective     History of Present Illness:  Skye Barlow is a 64 y.o. female who presents today for dizziness and weakness.  It sounds like it got better.  She is so fatigued she takes her Toprol-XL in the morning (move it to bedtime taking another 1 a day at lunch and taking another 1 a day at supper and taking another 1 a day at at bedtime)    Review of Systems:   Review of Systems    Past Medical History:   Past Medical History:   Diagnosis Date    Anxiety and depression 2024    Anxiety state     Benign essential hypertension     Chronic obstructive lung disease     COPD (chronic obstructive pulmonary disease)     Depressive disorder     Gastroesophageal reflux disease     Migraines     Psoriasis     TMJ (dislocation of temporomandibular joint)     Tobacco abuse        Past Surgical History:   Past Surgical History:   Procedure Laterality Date     SECTION      X3       Family History:   Family History   Problem Relation Age of Onset    Hypertension Mother     Hypertension Father     Dementia Father     Colon cancer Maternal Grandmother     Coronary artery disease Paternal Grandmother     Diabetes Paternal Grandmother     Ovarian cancer Paternal Aunt     Hypertension Other     Diabetes Other     Breast cancer Neg Hx        Social History:   Social History     Socioeconomic History    Marital status:    Tobacco Use    Smoking status: Every Day     Current packs/day: 0.25     Average packs/day: 0.2 packs/day for 43.5 years (10.9 ttl pk-yrs)     Types: Cigarettes     Start date: 4/15/1981     Passive exposure: Current    Smokeless tobacco: Never    Tobacco comments:     Down to 2-3 cigarettes a day    Vaping Use    Vaping status: Never Used   Substance and Sexual Activity    Alcohol use: Never    Drug use: Never    Sexual activity: Defer       Immunizations:    Immunization History   Administered Date(s) Administered    COVID-19 (PFIZER) Purple Cap Monovalent 08/31/2021, 10/11/2021    Flu Vaccine Quad PF >36MO 12/09/2019, 10/25/2021    Fluzone (or Fluarix & Flulaval for VFC) >6mos 12/09/2019, 10/25/2021    Fluzone Quad >6mos (Multi-dose) 11/12/2020    Influenza Injectable Mdck Pf Quad 11/08/2022, 10/31/2023    Influenza, Unspecified 11/08/2022    Pneumococcal Conjugate 13-Valent (PCV13) 12/05/2017    Pneumococcal Polysaccharide (PPSV23) 07/09/2019    Tdap 11/25/2014        Medications:     Current Outpatient Medications:     albuterol (PROVENTIL) 4 MG tablet, Take 2 tablets by mouth 2 (Two) Times a Day., Disp: , Rfl:     albuterol sulfate  (90 Base) MCG/ACT inhaler, Inhale 2 puffs Every 4 (Four) Hours As Needed for Wheezing., Disp: 8.5 g, Rfl: 1    buPROPion XL (Wellbutrin XL) 300 MG 24 hr tablet, Take 1 tablet by mouth Daily., Disp: 90 tablet, Rfl: 1    famotidine (PEPCID) 40 MG tablet, TAKE 1 TABLET BY MOUTH DAILY, Disp: 90 tablet, Rfl: 1    FLUoxetine (PROzac) 20 MG capsule, Take 1 capsule by mouth Daily., Disp: , Rfl:     LORazepam (ATIVAN) 0.5 MG tablet, TAKE 1 TABLET BY MOUTH EVERY 8 HOURS AS NEEDED FOR ANXIETY, Disp: 30 tablet, Rfl: 2    losartan (Cozaar) 100 MG tablet, Take 1 tablet by mouth Daily., Disp: 90 tablet, Rfl: 3    metoprolol succinate XL (TOPROL-XL) 50 MG 24 hr tablet, Take 1 tablet by mouth Every Night., Disp: 90 tablet, Rfl: 3    montelukast (Singulair) 10 MG tablet, Take 1 tablet by mouth Every Night., Disp: 30 tablet, Rfl: 5    rosuvastatin (CRESTOR) 20 MG tablet, Take 1 tablet by mouth Daily., Disp: 90 tablet, Rfl: 3    Spiriva Respimat 2.5 MCG/ACT aerosol solution inhaler, INHALE 2 PUFFS BY MOUTH DAILY, Disp: 4 g, Rfl: 1    Symbicort 160-4.5 MCG/ACT inhaler, Inhale 2 puffs 2 (Two) Times a Day., Disp: , Rfl:     Allergies:   Allergies   Allergen Reactions    Latex Rash    Sulfa Antibiotics Hives    Sulfamethoxazole Rash    Trimethoprim  "Rash       Objective     Vital Signs  Ht 144.8 cm (57\")   Wt 68 kg (150 lb)   SpO2 98%   BMI 32.46 kg/m²   Estimated body mass index is 32.46 kg/m² as calculated from the following:    Height as of this encounter: 144.8 cm (57\").    Weight as of this encounter: 68 kg (150 lb).            Physical Exam  Vitals and nursing note reviewed.   Constitutional:       Appearance: Normal appearance. She is obese.   HENT:      Head: Normocephalic.      Right Ear: External ear normal.      Left Ear: External ear normal.      Nose: Nose normal.   Eyes:      Pupils: Pupils are equal, round, and reactive to light.   Neck:      Vascular: No carotid bruit.   Cardiovascular:      Rate and Rhythm: Normal rate and regular rhythm.      Pulses: Normal pulses.      Heart sounds: Normal heart sounds.   Pulmonary:      Effort: Pulmonary effort is normal.      Breath sounds: Normal breath sounds.   Musculoskeletal:      Cervical back: Normal range of motion and neck supple.   Lymphadenopathy:      Cervical: No cervical adenopathy.   Skin:     General: Skin is warm and dry.   Neurological:      Mental Status: She is alert.   Psychiatric:         Mood and Affect: Mood normal.          Procedures     Assessment and Plan     1. Essential hypertension  Move up Toprol XL every morning to nightly    2. Dizzy  Auto helped with the dizziness    3. Screening for lung cancer  Due in July 28, 2024  -  CT Chest Low Dose Cancer Screening WO; Future    4. Nicotine dependence, cigarettes, in remission  4 months in remission  -  CT Chest Low Dose Cancer Screening WO; Future    5. Anxiety and depression  The patient opted to change it to 150.  Leave it at 300  - buPROPion XL (Wellbutrin XL) 300 MG 24 hr tablet; Take 1 tablet by mouth Daily.  Dispense: 90 tablet; Refill: 1       Follow Up  Return in about 4 weeks (around 11/11/2024).    @[facility]  [address] [facility] [place] " [id number] [phone]

## 2024-10-30 ENCOUNTER — OFFICE VISIT (OUTPATIENT)
Dept: FAMILY MEDICINE CLINIC | Facility: CLINIC | Age: 64
End: 2024-10-30
Payer: MEDICARE

## 2024-10-30 VITALS
OXYGEN SATURATION: 97 % | DIASTOLIC BLOOD PRESSURE: 68 MMHG | WEIGHT: 151 LBS | HEART RATE: 75 BPM | BODY MASS INDEX: 32.58 KG/M2 | HEIGHT: 57 IN | SYSTOLIC BLOOD PRESSURE: 126 MMHG

## 2024-10-30 DIAGNOSIS — I10 ESSENTIAL HYPERTENSION: ICD-10-CM

## 2024-10-30 DIAGNOSIS — E78.5 HYPERLIPIDEMIA LDL GOAL <100: ICD-10-CM

## 2024-10-30 DIAGNOSIS — R91.8 PULMONARY NODULES: ICD-10-CM

## 2024-10-30 DIAGNOSIS — F41.9 ANXIETY AND DEPRESSION: ICD-10-CM

## 2024-10-30 DIAGNOSIS — J43.2 CENTRILOBULAR EMPHYSEMA: Primary | ICD-10-CM

## 2024-10-30 DIAGNOSIS — F32.A ANXIETY AND DEPRESSION: ICD-10-CM

## 2024-10-30 PROCEDURE — 3078F DIAST BP <80 MM HG: CPT | Performed by: FAMILY MEDICINE

## 2024-10-30 PROCEDURE — 1126F AMNT PAIN NOTED NONE PRSNT: CPT | Performed by: FAMILY MEDICINE

## 2024-10-30 PROCEDURE — 99214 OFFICE O/P EST MOD 30 MIN: CPT | Performed by: FAMILY MEDICINE

## 2024-10-30 PROCEDURE — 3074F SYST BP LT 130 MM HG: CPT | Performed by: FAMILY MEDICINE

## 2024-10-30 PROCEDURE — 1159F MED LIST DOCD IN RCRD: CPT | Performed by: FAMILY MEDICINE

## 2024-10-30 PROCEDURE — 1160F RVW MEDS BY RX/DR IN RCRD: CPT | Performed by: FAMILY MEDICINE

## 2024-10-30 NOTE — PROGRESS NOTES
Follow Up Office Visit      Date of Visit:  10/30/2024   Patient Name: Skye Barlow  : 1960   MRN: [id number]     Chief Complaint:    Chief Complaint   Patient presents with    Results     Discuss ct results    Follow-up     Insomnia/ legs running when waking up?       History of Present Illness: Skye Barlow is a 64 y.o. female who is here today for follow up.    History of Present Illness  The patient presents for evaluation of multiple medical concerns.    She has been participating in pulmonary rehabilitation for the past 6 weeks, which includes exercises such as cycling and weight lifting. Despite this, she continues to experience fatigue and shortness of breath, particularly during physical activity. She has a history of smoking but quit approximately 4 to 5 months ago.    She also reports a fear of enclosed spaces, known as claustrophobia.    She is currently taking zinc supplements three times a week and is considering adding elderberry to her regimen. However, she is unsure if it is safe to combine these two supplements.    Will go over CT Chest    FAMILY HISTORY  Her mother had emphysema.      Subjective      Review of Systems:   Review of Systems    Past Medical History:   Past Medical History:   Diagnosis Date    Anxiety and depression 2024    Anxiety state     Benign essential hypertension     Chronic obstructive lung disease     COPD (chronic obstructive pulmonary disease)     Depressive disorder     Gastroesophageal reflux disease     Migraines     Psoriasis     TMJ (dislocation of temporomandibular joint)     Tobacco abuse        Past Surgical History:   Past Surgical History:   Procedure Laterality Date     SECTION      X3       Family History:   Family History   Problem Relation Age of Onset    Hypertension Mother     Hypertension Father     Dementia Father     Colon cancer Maternal Grandmother     Coronary artery disease Paternal Grandmother     Diabetes Paternal  Grandmother     Ovarian cancer Paternal Aunt     Hypertension Other     Diabetes Other     Breast cancer Neg Hx        Social History:   Social History     Socioeconomic History    Marital status:    Tobacco Use    Smoking status: Every Day     Current packs/day: 0.25     Average packs/day: 0.2 packs/day for 43.5 years (10.9 ttl pk-yrs)     Types: Cigarettes     Start date: 4/15/1981     Passive exposure: Current    Smokeless tobacco: Never    Tobacco comments:     Down to 2-3 cigarettes a day    Vaping Use    Vaping status: Never Used   Substance and Sexual Activity    Alcohol use: Never    Drug use: Never    Sexual activity: Defer       Medications:     Current Outpatient Medications:     albuterol (PROVENTIL) 4 MG tablet, Take 2 tablets by mouth 2 (Two) Times a Day., Disp: , Rfl:     albuterol sulfate  (90 Base) MCG/ACT inhaler, Inhale 2 puffs Every 4 (Four) Hours As Needed for Wheezing., Disp: 8.5 g, Rfl: 1    buPROPion XL (Wellbutrin XL) 300 MG 24 hr tablet, Take 1 tablet by mouth Daily., Disp: 90 tablet, Rfl: 1    famotidine (PEPCID) 40 MG tablet, TAKE 1 TABLET BY MOUTH DAILY, Disp: 90 tablet, Rfl: 1    FLUoxetine (PROzac) 20 MG capsule, Take 1 capsule by mouth Daily., Disp: , Rfl:     LORazepam (ATIVAN) 0.5 MG tablet, TAKE 1 TABLET BY MOUTH EVERY 8 HOURS AS NEEDED FOR ANXIETY, Disp: 30 tablet, Rfl: 2    losartan (Cozaar) 100 MG tablet, Take 1 tablet by mouth Daily., Disp: 90 tablet, Rfl: 3    metoprolol succinate XL (TOPROL-XL) 50 MG 24 hr tablet, Take 1 tablet by mouth Every Night., Disp: 90 tablet, Rfl: 3    montelukast (Singulair) 10 MG tablet, Take 1 tablet by mouth Every Night., Disp: 30 tablet, Rfl: 5    rosuvastatin (CRESTOR) 20 MG tablet, Take 1 tablet by mouth Daily., Disp: 90 tablet, Rfl: 3    Spiriva Respimat 2.5 MCG/ACT aerosol solution inhaler, INHALE 2 PUFFS BY MOUTH DAILY, Disp: 4 g, Rfl: 1    Symbicort 160-4.5 MCG/ACT inhaler, Inhale 2 puffs 2 (Two) Times a Day., Disp: , Rfl:  "    Allergies:   Allergies   Allergen Reactions    Latex Rash    Sulfa Antibiotics Hives    Sulfamethoxazole Rash    Trimethoprim Rash       Objective     Physical Exam:  Vital Signs:   Vitals:    10/30/24 1320   BP: 126/68   Pulse: 75   SpO2: 97%   Weight: 68.5 kg (151 lb)   Height: 144.8 cm (57\")     Body mass index is 32.68 kg/m².     Physical Exam  Vitals and nursing note reviewed.   Constitutional:       Appearance: Normal appearance. She is obese.   HENT:      Head: Normocephalic.      Right Ear: External ear normal.      Left Ear: External ear normal.      Nose: Nose normal.   Eyes:      Pupils: Pupils are equal, round, and reactive to light.   Neck:      Vascular: No carotid bruit.   Cardiovascular:      Rate and Rhythm: Normal rate and regular rhythm.      Pulses: Normal pulses.      Heart sounds: Normal heart sounds.   Pulmonary:      Effort: Pulmonary effort is normal.      Breath sounds: Normal breath sounds.   Musculoskeletal:      Cervical back: Normal range of motion and neck supple.   Skin:     General: Skin is warm and dry.   Neurological:      Mental Status: She is alert.   Psychiatric:         Mood and Affect: Mood normal.       Physical Exam      Results  Imaging  CT scan shows nodules of 2 to 3 mm and 7 mm. Linear scar or atelectasis observed.    Procedures      Assessment / Plan      Assessment/Plan:   Diagnoses and all orders for this visit:    1. Centrilobular emphysema (Primary)    2. Anxiety and depression    3. Essential hypertension    4. Hyperlipidemia LDL goal <100    5. Pulmonary nodules       Repeat on 4.24.25     Assessment & Plan  1. Emphysema.  The presence of linear scarring or atelectasis was noted. She has been experiencing shortness of breath, especially during physical activities. She is currently undergoing pulmonary rehab, which includes exercises like using a ski machine and a bicycle, as well as lifting weights. She has been advised to continue these exercises to " strengthen her lungs. A repeat CT scan was ordered for 04/23/2024.    2. Medication Management.  She was advised to continue with her elderberry supplement. Additionally, she was recommended to take vitamin C 500 mg, vitamin E 400 IU, and zinc 50 mg.    Follow-up  Return in 6 months for follow up.    Follow Up:   Return in about 4 months (around 2/28/2025).    Patient or patient representative verbalized consent for the use of Ambient Listening during the visit with  Rod Stone MD for chart documentation. 10/30/2024  14:00 EDT     @[facility] [place]

## 2024-10-31 RX ORDER — TIOTROPIUM BROMIDE INHALATION SPRAY 3.12 UG/1
SPRAY, METERED RESPIRATORY (INHALATION)
Qty: 4 G | Refills: 1 | Status: SHIPPED | OUTPATIENT
Start: 2024-10-31

## 2024-11-04 ENCOUNTER — OFFICE VISIT (OUTPATIENT)
Dept: FAMILY MEDICINE CLINIC | Facility: CLINIC | Age: 64
End: 2024-11-04
Payer: MEDICARE

## 2024-11-04 VITALS
OXYGEN SATURATION: 98 % | WEIGHT: 152 LBS | BODY MASS INDEX: 32.79 KG/M2 | HEIGHT: 57 IN | HEART RATE: 77 BPM | SYSTOLIC BLOOD PRESSURE: 138 MMHG | DIASTOLIC BLOOD PRESSURE: 84 MMHG

## 2024-11-04 DIAGNOSIS — J06.9 ACUTE URI: ICD-10-CM

## 2024-11-04 DIAGNOSIS — J43.2 CENTRILOBULAR EMPHYSEMA: Primary | ICD-10-CM

## 2024-11-04 PROCEDURE — 99214 OFFICE O/P EST MOD 30 MIN: CPT | Performed by: NURSE PRACTITIONER

## 2024-11-04 PROCEDURE — 3079F DIAST BP 80-89 MM HG: CPT | Performed by: NURSE PRACTITIONER

## 2024-11-04 PROCEDURE — 3075F SYST BP GE 130 - 139MM HG: CPT | Performed by: NURSE PRACTITIONER

## 2024-11-04 PROCEDURE — 87428 SARSCOV & INF VIR A&B AG IA: CPT | Performed by: NURSE PRACTITIONER

## 2024-11-04 PROCEDURE — 1160F RVW MEDS BY RX/DR IN RCRD: CPT | Performed by: NURSE PRACTITIONER

## 2024-11-04 PROCEDURE — 1159F MED LIST DOCD IN RCRD: CPT | Performed by: NURSE PRACTITIONER

## 2024-11-04 PROCEDURE — 1126F AMNT PAIN NOTED NONE PRSNT: CPT | Performed by: NURSE PRACTITIONER

## 2024-11-04 RX ORDER — DOXYCYCLINE 100 MG/1
100 CAPSULE ORAL 2 TIMES DAILY
Qty: 20 CAPSULE | Refills: 0 | Status: SHIPPED | OUTPATIENT
Start: 2024-11-04

## 2024-11-04 NOTE — PROGRESS NOTES
"Chief Complaint  Cough, Generalized Body Aches, and Nasal Congestion    Subjective          Skye Barlow presents to [facility]  History of Present Illness  Pt has had cough and congestion x 2 days. She denies fever, chills, or myalgias. She denies worsened dyspnea or wheezing.   Cough  Associated symptoms include rhinorrhea. Pertinent negatives include no chills, fever or shortness of breath.       History of Present Illness      Objective   Vital Signs:   /84   Pulse 77   Ht 144.8 cm (57\")   Wt 68.9 kg (152 lb)   SpO2 98%   BMI 32.89 kg/m²     Body mass index is 32.89 kg/m².    Review of Systems   Constitutional:  Negative for chills and fever.   HENT:  Positive for congestion and rhinorrhea.    Respiratory:  Positive for cough. Negative for shortness of breath.    Gastrointestinal:  Negative for diarrhea, nausea and vomiting.          Current Outpatient Medications:     albuterol (PROVENTIL) 4 MG tablet, Take 2 tablets by mouth 2 (Two) Times a Day., Disp: , Rfl:     albuterol sulfate  (90 Base) MCG/ACT inhaler, Inhale 2 puffs Every 4 (Four) Hours As Needed for Wheezing., Disp: 8.5 g, Rfl: 1    buPROPion XL (Wellbutrin XL) 300 MG 24 hr tablet, Take 1 tablet by mouth Daily., Disp: 90 tablet, Rfl: 1    famotidine (PEPCID) 40 MG tablet, TAKE 1 TABLET BY MOUTH DAILY, Disp: 90 tablet, Rfl: 1    FLUoxetine (PROzac) 20 MG capsule, Take 1 capsule by mouth Daily., Disp: , Rfl:     LORazepam (ATIVAN) 0.5 MG tablet, TAKE 1 TABLET BY MOUTH EVERY 8 HOURS AS NEEDED FOR ANXIETY, Disp: 30 tablet, Rfl: 2    losartan (Cozaar) 100 MG tablet, Take 1 tablet by mouth Daily., Disp: 90 tablet, Rfl: 3    metoprolol succinate XL (TOPROL-XL) 50 MG 24 hr tablet, Take 1 tablet by mouth Every Night., Disp: 90 tablet, Rfl: 3    montelukast (Singulair) 10 MG tablet, Take 1 tablet by mouth Every Night., Disp: 30 tablet, Rfl: 5    rosuvastatin (CRESTOR) 20 MG tablet, Take 1 tablet by mouth " Daily., Disp: 90 tablet, Rfl: 3    Spiriva Respimat 2.5 MCG/ACT aerosol solution inhaler, INHALE 2 PUFFS BY MOUTH DAILY, Disp: 4 g, Rfl: 1    Symbicort 160-4.5 MCG/ACT inhaler, Inhale 2 puffs 2 (Two) Times a Day., Disp: , Rfl:     doxycycline (VIBRAMYCIN) 100 MG capsule, Take 1 capsule by mouth 2 (Two) Times a Day., Disp: 20 capsule, Rfl: 0      Allergies: Latex, Sulfa antibiotics, Sulfamethoxazole, and Trimethoprim    Physical Exam  Constitutional:       Appearance: Normal appearance.   HENT:      Head: Normocephalic.   Eyes:      Conjunctiva/sclera: Conjunctivae normal.      Pupils: Pupils are equal, round, and reactive to light.   Cardiovascular:      Rate and Rhythm: Normal rate and regular rhythm.      Heart sounds: Normal heart sounds.   Pulmonary:      Effort: Pulmonary effort is normal.      Breath sounds: Normal breath sounds.   Abdominal:      Tenderness: There is no abdominal tenderness.   Musculoskeletal:         General: Normal range of motion.   Skin:     General: Skin is warm and dry.      Capillary Refill: Capillary refill takes less than 2 seconds.   Neurological:      General: No focal deficit present.      Mental Status: She is alert and oriented to person, place, and time.   Psychiatric:         Mood and Affect: Mood normal.         Behavior: Behavior normal.         Thought Content: Thought content normal.         Judgment: Judgment normal.          Physical Exam         Result Review :                Results            Assessment and Plan    Diagnoses and all orders for this visit:    1. Centrilobular emphysema (Primary)  Comments:  Continue current meds. Albuterol PRN. Cover with antibiotics if not improving in 3-4 days.  Orders:  -     doxycycline (VIBRAMYCIN) 100 MG capsule; Take 1 capsule by mouth 2 (Two) Times a Day.  Dispense: 20 capsule; Refill: 0    2. Acute URI  Comments:  Increase fluids. Mucinex, Benadryl, Albuterol, and Robitussin PRN. Fill antibiotics if not improving in 3-4  days.  Orders:  -     doxycycline (VIBRAMYCIN) 100 MG capsule; Take 1 capsule by mouth 2 (Two) Times a Day.  Dispense: 20 capsule; Refill: 0  -     POCT SARS-CoV-2 Antigen MARCUS + Flu                  Follow Up   Return in about 1 week (around 11/11/2024) for if not improving or sooner if symptoms worsen.  Patient was given instructions and counseling regarding her condition or for health maintenance advice. Please see specific information pulled into the AVS if appropriate.     RICKEY Lion

## 2024-11-07 DIAGNOSIS — F06.4 ANXIETY DISORDER DUE TO GENERAL MEDICAL CONDITION: ICD-10-CM

## 2024-11-07 RX ORDER — FAMOTIDINE 40 MG/1
40 TABLET, FILM COATED ORAL DAILY
Qty: 90 TABLET | Refills: 1 | OUTPATIENT
Start: 2024-11-07

## 2024-11-07 RX ORDER — LORAZEPAM 0.5 MG/1
TABLET ORAL
Qty: 30 TABLET | Refills: 2 | Status: SHIPPED | OUTPATIENT
Start: 2024-11-07

## 2024-11-13 ENCOUNTER — OFFICE VISIT (OUTPATIENT)
Dept: FAMILY MEDICINE CLINIC | Facility: CLINIC | Age: 64
End: 2024-11-13
Payer: MEDICARE

## 2024-11-13 VITALS
WEIGHT: 153 LBS | BODY MASS INDEX: 33.01 KG/M2 | HEART RATE: 78 BPM | SYSTOLIC BLOOD PRESSURE: 128 MMHG | HEIGHT: 57 IN | DIASTOLIC BLOOD PRESSURE: 78 MMHG | OXYGEN SATURATION: 97 %

## 2024-11-13 DIAGNOSIS — F41.9 ANXIETY: Primary | ICD-10-CM

## 2024-11-13 DIAGNOSIS — I10 ESSENTIAL HYPERTENSION: ICD-10-CM

## 2024-11-13 DIAGNOSIS — S46.002S OSTEOARTHRITIS OF SHOULDER DUE TO ROTATOR CUFF INJURY, LEFT: ICD-10-CM

## 2024-11-13 DIAGNOSIS — M19.112 OSTEOARTHRITIS OF SHOULDER DUE TO ROTATOR CUFF INJURY, LEFT: ICD-10-CM

## 2024-11-13 DIAGNOSIS — J43.2 CENTRILOBULAR EMPHYSEMA: ICD-10-CM

## 2024-11-13 PROCEDURE — 99213 OFFICE O/P EST LOW 20 MIN: CPT | Performed by: FAMILY MEDICINE

## 2024-11-13 PROCEDURE — 1160F RVW MEDS BY RX/DR IN RCRD: CPT | Performed by: FAMILY MEDICINE

## 2024-11-13 PROCEDURE — 1159F MED LIST DOCD IN RCRD: CPT | Performed by: FAMILY MEDICINE

## 2024-11-13 PROCEDURE — 1126F AMNT PAIN NOTED NONE PRSNT: CPT | Performed by: FAMILY MEDICINE

## 2024-11-13 PROCEDURE — 3074F SYST BP LT 130 MM HG: CPT | Performed by: FAMILY MEDICINE

## 2024-11-13 PROCEDURE — 3078F DIAST BP <80 MM HG: CPT | Performed by: FAMILY MEDICINE

## 2024-11-13 PROCEDURE — 20610 DRAIN/INJ JOINT/BURSA W/O US: CPT | Performed by: FAMILY MEDICINE

## 2024-11-13 RX ORDER — TRIAMCINOLONE ACETONIDE 40 MG/ML
40 INJECTION, SUSPENSION INTRA-ARTICULAR; INTRAMUSCULAR
Status: COMPLETED | OUTPATIENT
Start: 2024-11-13 | End: 2024-11-13

## 2024-11-13 RX ORDER — LIDOCAINE HYDROCHLORIDE 10 MG/ML
1 INJECTION, SOLUTION INFILTRATION; PERINEURAL
Status: COMPLETED | OUTPATIENT
Start: 2024-11-13 | End: 2024-11-13

## 2024-11-13 RX ADMIN — TRIAMCINOLONE ACETONIDE 40 MG: 40 INJECTION, SUSPENSION INTRA-ARTICULAR; INTRAMUSCULAR at 12:10

## 2024-11-13 RX ADMIN — LIDOCAINE HYDROCHLORIDE 1 ML: 10 INJECTION, SOLUTION INFILTRATION; PERINEURAL at 12:10

## 2024-11-13 NOTE — PROGRESS NOTES
Follow Up Office Visit      Date of Visit:  2024   Patient Name: Skye Barlow  : 1960   MRN: [id number]     Chief Complaint:    Chief Complaint   Patient presents with    med recheck     Follow up       History of Present Illness: Skye Barlow is a 64 y.o. female who is here today for follow up.    History of Present Illness  The patient presents for evaluation of shoulder pain.    She reports a long-standing issue with her arm, which was exacerbated a few Sundays ago when she accidentally applied pressure to it, causing severe pain. She suspects the problem may be related to her rotator cuff. She recalls an incident at [employer] where she experienced intense arm pain while fanning herself, to the point where she felt nauseous. Happened 2  months ago/.    Despite her efforts to avoid sleeping on the affected shoulder, she occasionally does so. She also mentions a persistent bruise on her shoulder.    Additionally, she has had three falls in the past two months, which have resulted in significant pain. She also notes that her nerves are still not functioning properly.      Subjective      Review of Systems:   Review of Systems    Past Medical History:   Past Medical History:   Diagnosis Date    Anxiety and depression 2024    Anxiety state     Benign essential hypertension     Chronic obstructive lung disease     COPD (chronic obstructive pulmonary disease)     Depressive disorder     Gastroesophageal reflux disease     Migraines     Psoriasis     TMJ (dislocation of temporomandibular joint)     Tobacco abuse        Past Surgical History:   Past Surgical History:   Procedure Laterality Date     SECTION      X3       Family History:   Family History   Problem Relation Age of Onset    Hypertension Mother     Hypertension Father     Dementia Father     Colon cancer Maternal Grandmother     Coronary artery disease Paternal Grandmother     Diabetes Paternal Grandmother     Ovarian  cancer Paternal Aunt     Hypertension Other     Diabetes Other     Breast cancer Neg Hx        Social History:   Social History     Socioeconomic History    Marital status:    Tobacco Use    Smoking status: Former     Current packs/day: 0.00     Average packs/day: 0.3 packs/day for 43.2 years (10.8 ttl pk-yrs)     Types: Cigarettes     Start date: 4/15/1981     Quit date: 2024     Years since quittin.3     Passive exposure: Current    Smokeless tobacco: Never    Tobacco comments:     Down to 2-3 cigarettes a day    Vaping Use    Vaping status: Never Used   Substance and Sexual Activity    Alcohol use: Never    Drug use: Never    Sexual activity: Defer       Medications:     Current Outpatient Medications:     albuterol (PROVENTIL) 4 MG tablet, Take 2 tablets by mouth 2 (Two) Times a Day., Disp: , Rfl:     albuterol sulfate  (90 Base) MCG/ACT inhaler, Inhale 2 puffs Every 4 (Four) Hours As Needed for Wheezing., Disp: 8.5 g, Rfl: 1    buPROPion XL (Wellbutrin XL) 300 MG 24 hr tablet, Take 1 tablet by mouth Daily., Disp: 90 tablet, Rfl: 1    doxycycline (VIBRAMYCIN) 100 MG capsule, Take 1 capsule by mouth 2 (Two) Times a Day., Disp: 20 capsule, Rfl: 0    famotidine (PEPCID) 40 MG tablet, TAKE 1 TABLET BY MOUTH DAILY, Disp: 90 tablet, Rfl: 1    FLUoxetine (PROzac) 20 MG capsule, Take 1 capsule by mouth Daily., Disp: , Rfl:     LORazepam (ATIVAN) 0.5 MG tablet, TAKE 1 TABLET BY MOUTH EVERY 8 HOURS AS NEEDED FOR ANXIETY, Disp: 30 tablet, Rfl: 2    losartan (Cozaar) 100 MG tablet, Take 1 tablet by mouth Daily., Disp: 90 tablet, Rfl: 3    metoprolol succinate XL (TOPROL-XL) 50 MG 24 hr tablet, Take 1 tablet by mouth Every Night., Disp: 90 tablet, Rfl: 3    montelukast (Singulair) 10 MG tablet, Take 1 tablet by mouth Every Night., Disp: 30 tablet, Rfl: 5    rosuvastatin (CRESTOR) 20 MG tablet, Take 1 tablet by mouth Daily., Disp: 90 tablet, Rfl: 3    Spiriva Respimat 2.5 MCG/ACT aerosol solution  "inhaler, INHALE 2 PUFFS BY MOUTH DAILY, Disp: 4 g, Rfl: 1    Symbicort 160-4.5 MCG/ACT inhaler, Inhale 2 puffs 2 (Two) Times a Day., Disp: , Rfl:     Allergies:   Allergies   Allergen Reactions    Latex Rash    Sulfa Antibiotics Hives    Sulfamethoxazole Rash    Trimethoprim Rash       Objective     Physical Exam:  Vital Signs:   Vitals:    11/13/24 1100   BP: 128/78   Pulse: 78   SpO2: 97%   Weight: 69.4 kg (153 lb)   Height: 144.8 cm (57\")     Body mass index is 33.11 kg/m².     Physical Exam  Physical Exam      Results      Arthrocentesis    Date/Time: 11/13/2024 12:10 PM    Performed by: Rod Stone MD  Authorized by: Rod Stone MD  Indications: pain   Body area: shoulder  Joint: left shoulder  Local anesthesia used: no    Anesthesia:  Local anesthesia used: no    Sedation:  Patient sedated: no    Preparation: Patient was prepped and draped in the usual sterile fashion.  Needle size: 22 G  Ultrasound guidance: no  Approach: posterior  Meds administered: 1 mL lidocaine 1 %; 40 mg triamcinolone acetonide 40 MG/ML  Patient tolerance: patient tolerated the procedure well with no immediate complications            Assessment / Plan      Assessment/Plan:   Diagnoses and all orders for this visit:    1. Anxiety (Primary)    2. Essential hypertension    3. Centrilobular emphysema    4. Osteoarthritis of shoulder due to rotator cuff injury, left       Assessment & Plan  1. Shoulder pain.  The patient reports ongoing shoulder pain, exacerbated by certain movements and activities. The pain is likely related to the rotator cuff. She has experienced significant discomfort, including an incident where pressing on the shoulder caused severe pain. Previous falls and a bruise on the shoulder were noted. A cortisone injection will be administered today to help alleviate the pain.        Follow Up:   Return in about 3 months (around 2/13/2025).    Patient or patient representative verbalized consent for the use of Ambient " Listening during the visit with  Rod Stone MD for chart documentation. 11/13/2024  12:12 EST     @[facility]

## 2024-12-18 ENCOUNTER — OFFICE VISIT (OUTPATIENT)
Dept: FAMILY MEDICINE CLINIC | Facility: CLINIC | Age: 64
End: 2024-12-18
Payer: MEDICARE

## 2024-12-18 VITALS
SYSTOLIC BLOOD PRESSURE: 154 MMHG | WEIGHT: 159 LBS | BODY MASS INDEX: 34.3 KG/M2 | OXYGEN SATURATION: 96 % | DIASTOLIC BLOOD PRESSURE: 76 MMHG | HEART RATE: 83 BPM | HEIGHT: 57 IN

## 2024-12-18 DIAGNOSIS — F32.A ANXIETY AND DEPRESSION: ICD-10-CM

## 2024-12-18 DIAGNOSIS — F41.9 ANXIETY AND DEPRESSION: ICD-10-CM

## 2024-12-18 DIAGNOSIS — R05.1 ACUTE COUGH: ICD-10-CM

## 2024-12-18 DIAGNOSIS — J44.1 COPD WITH EXACERBATION: Primary | ICD-10-CM

## 2024-12-18 PROCEDURE — 87428 SARSCOV & INF VIR A&B AG IA: CPT | Performed by: PHYSICIAN ASSISTANT

## 2024-12-18 PROCEDURE — 1126F AMNT PAIN NOTED NONE PRSNT: CPT | Performed by: PHYSICIAN ASSISTANT

## 2024-12-18 PROCEDURE — 3078F DIAST BP <80 MM HG: CPT | Performed by: PHYSICIAN ASSISTANT

## 2024-12-18 PROCEDURE — 3077F SYST BP >= 140 MM HG: CPT | Performed by: PHYSICIAN ASSISTANT

## 2024-12-18 PROCEDURE — 99214 OFFICE O/P EST MOD 30 MIN: CPT | Performed by: PHYSICIAN ASSISTANT

## 2024-12-18 RX ORDER — AZITHROMYCIN 250 MG/1
TABLET, FILM COATED ORAL
Qty: 6 TABLET | Refills: 0 | Status: SHIPPED | OUTPATIENT
Start: 2024-12-18

## 2024-12-18 RX ORDER — BUPROPION HYDROCHLORIDE 150 MG/1
150 TABLET ORAL DAILY
Qty: 30 TABLET | Refills: 0 | OUTPATIENT
Start: 2024-12-18

## 2024-12-18 RX ORDER — PREDNISONE 20 MG/1
TABLET ORAL
Qty: 9 TABLET | Refills: 0 | Status: SHIPPED | OUTPATIENT
Start: 2024-12-18

## 2024-12-18 NOTE — PROGRESS NOTES
".Chief Complaint  Earache (Rt sided earache, started approx 1.5 week ago), Headache (With eye pressure), Cough (Productive cough with green and yellow sputum), and Wheezing    Subjective          History of Present Illness  Skye Balrow is here today with her  History of Present Illness  The patient presents for evaluation of cough, right ear pain, and COPD.    Approximately 1.5 weeks ago, she began experiencing severe pain in her right ear, which was accompanied by ocular discomfort and a headache. She also developed chest pain and a productive cough with green sputum, which transitions to clear and yellowish upon waking. The cough and congestion have persisted, but she reports no nasal discharge. She does not have any associated sore throat or nighttime coughing. She has not sought any pharmacological intervention for these symptoms due to uncertainty about the appropriate medication. She is not allergic to azithromycin.    She continues her regimen of Spiriva and Symbicort but has noticed an increased need for albuterol, which provides occasional relief. She expresses concern about a potential need for antibiotic therapy. She has been experiencing increased shortness of breath and fatigue. She is currently enrolled in a pulmonary rehabilitation program. She has received the RSV vaccine and is inquiring about the timing for her influenza vaccine.    ALLERGIES  The patient has no known allergies.    MEDICATIONS  Spiriva, Symbicort, albuterol    IMMUNIZATIONS  She has received the RSV vaccine.    Objective   Vital Signs:   /76   Pulse 83   Ht 144.8 cm (57\")   Wt 72.1 kg (159 lb)   SpO2 96%   BMI 34.41 kg/m²     Body mass index is 34.41 kg/m².  BMI is >= 30 and <35. (Class 1 Obesity). The following options were offered after discussion;: information on healthy weight added to patient's after visit summary       Review of Systems      Current Outpatient Medications:   •  albuterol (PROVENTIL) 4 MG " tablet, Take 2 tablets by mouth 2 (Two) Times a Day., Disp: , Rfl:   •  albuterol sulfate  (90 Base) MCG/ACT inhaler, Inhale 2 puffs Every 4 (Four) Hours As Needed for Wheezing., Disp: 8.5 g, Rfl: 1  •  buPROPion XL (Wellbutrin XL) 300 MG 24 hr tablet, Take 1 tablet by mouth Daily., Disp: 90 tablet, Rfl: 1  •  doxycycline (VIBRAMYCIN) 100 MG capsule, Take 1 capsule by mouth 2 (Two) Times a Day., Disp: 20 capsule, Rfl: 0  •  famotidine (PEPCID) 40 MG tablet, TAKE 1 TABLET BY MOUTH DAILY, Disp: 90 tablet, Rfl: 1  •  FLUoxetine (PROzac) 20 MG capsule, Take 1 capsule by mouth Daily., Disp: , Rfl:   •  LORazepam (ATIVAN) 0.5 MG tablet, TAKE 1 TABLET BY MOUTH EVERY 8 HOURS AS NEEDED FOR ANXIETY, Disp: 30 tablet, Rfl: 2  •  losartan (Cozaar) 100 MG tablet, Take 1 tablet by mouth Daily., Disp: 90 tablet, Rfl: 3  •  metoprolol succinate XL (TOPROL-XL) 50 MG 24 hr tablet, Take 1 tablet by mouth Every Night., Disp: 90 tablet, Rfl: 3  •  montelukast (Singulair) 10 MG tablet, Take 1 tablet by mouth Every Night., Disp: 30 tablet, Rfl: 5  •  rosuvastatin (CRESTOR) 20 MG tablet, Take 1 tablet by mouth Daily., Disp: 90 tablet, Rfl: 3  •  Spiriva Respimat 2.5 MCG/ACT aerosol solution inhaler, INHALE 2 PUFFS BY MOUTH DAILY, Disp: 4 g, Rfl: 1  •  Symbicort 160-4.5 MCG/ACT inhaler, Inhale 2 puffs 2 (Two) Times a Day., Disp: , Rfl:   •  azithromycin (ZITHROMAX) 250 MG tablet, Take 2 tablets the first day, then 1 tablet daily for 4 days., Disp: 6 tablet, Rfl: 0  •  predniSONE (DELTASONE) 20 MG tablet, 2 tab po qd x 3 days then 1 tab po qd x 3 days, Disp: 9 tablet, Rfl: 0    Allergies: Latex, Sulfa antibiotics, Sulfamethoxazole, and Trimethoprim    Physical Exam  Vitals and nursing note reviewed.   Constitutional:       General: She is not in acute distress.     Appearance: Normal appearance. She is not ill-appearing, toxic-appearing or diaphoretic.   HENT:      Head: Normocephalic and atraumatic.      Right Ear: Tympanic membrane,  ear canal and external ear normal.      Left Ear: Tympanic membrane, ear canal and external ear normal.      Nose: Congestion present.      Mouth/Throat:      Mouth: Mucous membranes are moist.      Comments: Clear post nasal drainage  Eyes:      Pupils: Pupils are equal, round, and reactive to light.   Cardiovascular:      Rate and Rhythm: Normal rate and regular rhythm.      Heart sounds: Normal heart sounds.   Pulmonary:      Effort: Pulmonary effort is normal. No respiratory distress.      Breath sounds: Wheezing and rhonchi present.   Neurological:      General: No focal deficit present.      Mental Status: She is alert.   Psychiatric:         Mood and Affect: Mood normal.         Behavior: Behavior normal.      Physical Exam      Result Review :          Results  Laboratory Studies  Swab test was negative for Covid and flu.             Assessment and Plan    Diagnoses and all orders for this visit:    1. COPD with exacerbation (Primary)  -     predniSONE (DELTASONE) 20 MG tablet; 2 tab po qd x 3 days then 1 tab po qd x 3 days  Dispense: 9 tablet; Refill: 0  -     azithromycin (ZITHROMAX) 250 MG tablet; Take 2 tablets the first day, then 1 tablet daily for 4 days.  Dispense: 6 tablet; Refill: 0  The viral infection has exacerbated her COPD, leading to increased shortness of breath and the need for more albuterol. She is advised to continue using Spiriva and Symbicort. The prednisone and Z-Colin prescribed for the viral infection will also help manage the COPD exacerbation.If symptoms worsen, she should seek immediate medical attention at the emergency room.  2. Acute cough  -     POCT SARS-CoV-2 + Flu Antigen MARCUS      Assessment & Plan    She is advised to receive her influenza vaccine as soon as possible, preferably the week after [place], provided she does not have a fever. Her next pneumonia shot is due in April 2025.      Follow Up   No follow-ups on file.  Patient was given instructions and counseling  regarding her condition or for health maintenance advice. Please see specific information pulled into the AVS if appropriate.     Patient or patient representative verbalized consent for the use of Ambient Listening during the visit with  NAN Carrillo for chart documentation. 12/18/2024  15:00 NAN Malin  12/18/2024

## 2024-12-20 ENCOUNTER — OFFICE VISIT (OUTPATIENT)
Dept: CARDIOLOGY | Facility: CLINIC | Age: 64
End: 2024-12-20
Payer: MEDICARE

## 2024-12-20 VITALS
WEIGHT: 163 LBS | BODY MASS INDEX: 35.17 KG/M2 | HEART RATE: 86 BPM | HEIGHT: 57 IN | RESPIRATION RATE: 18 BRPM | DIASTOLIC BLOOD PRESSURE: 74 MMHG | SYSTOLIC BLOOD PRESSURE: 128 MMHG | OXYGEN SATURATION: 98 %

## 2024-12-20 DIAGNOSIS — I10 ESSENTIAL HYPERTENSION: Primary | ICD-10-CM

## 2024-12-20 DIAGNOSIS — J43.1 PANLOBULAR EMPHYSEMA: ICD-10-CM

## 2024-12-20 DIAGNOSIS — E78.5 HYPERLIPIDEMIA LDL GOAL <100: ICD-10-CM

## 2024-12-20 RX ORDER — THEOPHYLLINE 400 MG/1
400 TABLET, EXTENDED RELEASE ORAL DAILY
COMMUNITY
Start: 2024-11-21

## 2024-12-21 DIAGNOSIS — F06.4 ANXIETY DISORDER DUE TO GENERAL MEDICAL CONDITION: ICD-10-CM

## 2024-12-21 NOTE — PROGRESS NOTES
[facility]  [facility]  [address] [place] [address]  Dept: [phone]  Dept Fax: [phone]    Skye Barlow  1960    Follow Up Office Visit Note    History of Present Illness:  Skye aBrlow is a 64 y.o. female who presents to the clinic for. Hypertension- The BP is 135.80, denies any complaints on Losartan 100 mg and also Toprol xl 50 mg EKG sinus HR 88 has a CT lungs with multiples nodules and faint calcifications of the coronary arteries, no CP    The following portions of the patient's history were reviewed and updated as appropriate: allergies, current medications, past family history, past medical history, past social history, past surgical history, and problem list.    Medications:  albuterol  albuterol sulfate HFA  azithromycin  buPROPion XL  doxycycline  famotidine  FLUoxetine  LORazepam  losartan  metoprolol succinate XL  montelukast  predniSONE  rosuvastatin  Spiriva Respimat aerosol solution  Symbicort aerosol  theophylline    Subjective  Allergies   Allergen Reactions    Latex Rash    Sulfa Antibiotics Hives    Sulfamethoxazole Rash    Trimethoprim Rash        Past Medical History:   Diagnosis Date    Anxiety and depression 2024    Anxiety state     Benign essential hypertension     Chronic obstructive lung disease     COPD (chronic obstructive pulmonary disease)     Depressive disorder     Gastroesophageal reflux disease     Migraines     Psoriasis     TMJ (dislocation of temporomandibular joint)     Tobacco abuse        Past Surgical History:   Procedure Laterality Date     SECTION      X3       Family History   Problem Relation Age of Onset    Hypertension Mother     Hypertension Father     Dementia Father     Colon cancer Maternal Grandmother     Coronary artery disease Paternal Grandmother     Diabetes Paternal Grandmother     Ovarian cancer Paternal Aunt     Hypertension Other     Diabetes Other     Breast cancer Neg  "Hx         Social History     Socioeconomic History    Marital status:    Tobacco Use    Smoking status: Former     Current packs/day: 0.00     Average packs/day: 0.3 packs/day for 43.2 years (10.8 ttl pk-yrs)     Types: Cigarettes     Start date: 4/15/1981     Quit date: 2024     Years since quittin.4     Passive exposure: Current    Smokeless tobacco: Never    Tobacco comments:     Down to 2-3 cigarettes a day    Vaping Use    Vaping status: Never Used   Substance and Sexual Activity    Alcohol use: Never    Drug use: Never    Sexual activity: Defer       Review of Systems   Constitutional: Negative.    HENT: Negative.     Respiratory: Negative.     Cardiovascular: Negative.    Endocrine: Negative.    Genitourinary: Negative.    Musculoskeletal: Negative.    Skin: Negative.    Allergic/Immunologic: Negative.    Neurological: Negative.    Hematological: Negative.    Psychiatric/Behavioral: Negative.       Cardiovascular Procedures    ECHO/MUGA:  STRESS TESTS:   CARDIAC CATH:   DEVICES:   HOLTER:   CT/MRI:   VASCULAR:   CARDIOTHORACIC:     Objective  Vitals:    24 1037   BP: 128/74   Pulse: 86   Resp: 18   SpO2: 98%   Weight: 73.9 kg (163 lb)   Height: 144.8 cm (57\")   PainSc: 0-No pain     Body mass index is 35.27 kg/m².     Physical Exam  Vitals reviewed.   Constitutional:       Appearance: Healthy appearance. Not in distress.   Neck:      Vascular: No JVR. JVD normal.   Pulmonary:      Effort: Pulmonary effort is normal.      Breath sounds: Normal breath sounds. No wheezing. No rhonchi. No rales.   Chest:      Chest wall: Not tender to palpatation.   Cardiovascular:      PMI at left midclavicular line. Normal rate. Regular rhythm. Normal S1. Normal S2.       Murmurs: There is no murmur.      No gallop.  No click. No rub.   Pulses:     Intact distal pulses.   Edema:     Peripheral edema absent.   Abdominal:      General: Bowel sounds are normal.      Palpations: Abdomen is soft.      " Tenderness: There is no abdominal tenderness.   Musculoskeletal: Normal range of motion.         General: No tenderness. Skin:     General: Skin is warm and dry.   Neurological:      General: No focal deficit present.      Mental Status: Alert and oriented to person, place and time.        Diagnostic Data    ECG 12 Lead    Date/Time: 12/20/2024 8:49 PM  Performed by: Andre Burt MD    Authorized by: Andre Burt MD  Comparison: compared with previous ECG from 6/21/2024  Similar to previous ECG  Rhythm: sinus rhythm  Rate: normal  BPM: 88  QRS axis: normal    Clinical impression: normal ECG        Assessment and Plan  Diagnoses and all orders for this visit:    Essential hypertension- BP is 135.80, on Losartan 100 mg and also Toprol xl 50 mg    Hyperlipidemia LDL goal <100- On Crestor 20 mg, Tolerates well, advised we need a lipid profile    Panlobular emphysema    Other orders  -     theophylline (UNIPHYL) 400 MG 24 hr tablet; Take 1 tablet by mouth Daily.         Return in about 6 months (around 6/20/2025) for Recheck with Dr. Burt.    Andre Burt MD  12/20/2024

## 2024-12-23 NOTE — TELEPHONE ENCOUNTER
Caller: Skye Barlow Ann    Relationship: Self    Best call back number: [phone]     Requested Prescriptions:   Requested Prescriptions     Pending Prescriptions Disp Refills    buPROPion XL (WELLBUTRIN XL) 150 MG 24 hr tablet [Pharmacy Med Name: BUPROPION XL 150MG TABLETS (24 H)] 30 tablet 5     Sig: TAKE 1 TABLET BY MOUTH DAILY    LORazepam (ATIVAN) 0.5 MG tablet 30 tablet 2     Sig: TAKE 1 TABLET BY MOUTH EVERY 8 HOURS AS NEEDED FOR ANXIETY        Pharmacy where request should be sent: [id number] [place], [place] - [address] AT [facility] [address] [facility] - [phone] [facility] [phone]      Last office visit with prescribing clinician: 11/13/2024   Last telemedicine visit with prescribing clinician: Visit date not found   Next office visit with prescribing clinician: Visit date not found     Does the patient have less than a 3 day supply:  [x] Yes  [] No    Would you like a call back once the refill request has been completed: [] Yes [x] No    If the office needs to give you a call back, can they leave a voicemail: [] Yes [x] No    Meli Stahl Rep   12/23/24 10:48 EST

## 2024-12-26 RX ORDER — LORAZEPAM 0.5 MG/1
TABLET ORAL
Qty: 30 TABLET | Refills: 2 | Status: SHIPPED | OUTPATIENT
Start: 2024-12-26

## 2024-12-26 RX ORDER — BUPROPION HYDROCHLORIDE 150 MG/1
150 TABLET ORAL DAILY
Qty: 30 TABLET | Refills: 5 | Status: SHIPPED | OUTPATIENT
Start: 2024-12-26

## 2025-01-07 ENCOUNTER — OFFICE VISIT (OUTPATIENT)
Dept: FAMILY MEDICINE CLINIC | Facility: CLINIC | Age: 65
End: 2025-01-07
Payer: MEDICARE

## 2025-01-07 VITALS
WEIGHT: 159.06 LBS | HEART RATE: 80 BPM | HEIGHT: 57 IN | DIASTOLIC BLOOD PRESSURE: 80 MMHG | OXYGEN SATURATION: 97 % | SYSTOLIC BLOOD PRESSURE: 136 MMHG | BODY MASS INDEX: 34.32 KG/M2

## 2025-01-07 DIAGNOSIS — H93.8X3 EAR FULLNESS, BILATERAL: ICD-10-CM

## 2025-01-07 DIAGNOSIS — R73.09 ELEVATED GLUCOSE: ICD-10-CM

## 2025-01-07 DIAGNOSIS — R21 RASH: Primary | ICD-10-CM

## 2025-01-07 PROCEDURE — 1126F AMNT PAIN NOTED NONE PRSNT: CPT | Performed by: NURSE PRACTITIONER

## 2025-01-07 PROCEDURE — 1159F MED LIST DOCD IN RCRD: CPT | Performed by: NURSE PRACTITIONER

## 2025-01-07 PROCEDURE — 3075F SYST BP GE 130 - 139MM HG: CPT | Performed by: NURSE PRACTITIONER

## 2025-01-07 PROCEDURE — 1160F RVW MEDS BY RX/DR IN RCRD: CPT | Performed by: NURSE PRACTITIONER

## 2025-01-07 PROCEDURE — 99213 OFFICE O/P EST LOW 20 MIN: CPT | Performed by: NURSE PRACTITIONER

## 2025-01-07 PROCEDURE — 3079F DIAST BP 80-89 MM HG: CPT | Performed by: NURSE PRACTITIONER

## 2025-01-07 RX ORDER — FLUTICASONE PROPIONATE 50 MCG
2 SPRAY, SUSPENSION (ML) NASAL DAILY
Qty: 16 G | Refills: 2 | Status: SHIPPED | OUTPATIENT
Start: 2025-01-07

## 2025-01-07 RX ORDER — TRIAMCINOLONE ACETONIDE 1 MG/G
1 CREAM TOPICAL 2 TIMES DAILY PRN
Qty: 80 G | Refills: 0 | Status: SHIPPED | OUTPATIENT
Start: 2025-01-07

## 2025-01-07 RX ORDER — LORATADINE 10 MG/1
10 TABLET ORAL DAILY
Qty: 30 TABLET | Refills: 2 | Status: SHIPPED | OUTPATIENT
Start: 2025-01-07

## 2025-01-07 RX ORDER — PREDNISONE 20 MG/1
TABLET ORAL
Qty: 12 TABLET | Refills: 0 | Status: SHIPPED | OUTPATIENT
Start: 2025-01-07

## 2025-01-07 NOTE — PROGRESS NOTES
"Chief Complaint  Bilateral ear Pain    Subjective          Skye Barlow presents to [facility]  History of Present Illness  History of Present Illness  The patient is a 64-year-old female who presents for evaluation of ear pain.    She has been experiencing intermittent ear pain for approximately 2-3 weeks, with the right ear being more affected than the left. The pain is described as a pressure-like sensation. She reports no ear drainage or muffled hearing. She does not report any systemic symptoms such as fever, chills, or cough. She has no history of ear tube placement. She expresses concern about the potential for eardrum rupture. She typically avoids ibuprofen but has taken Tylenol and ibuprofen concurrently in the past during a mild COVID-19 infection. A recent consultation revealed the presence of fluid in her ears.    She has developed a rash over the past two weeks, which is associated with itching. The rash is localized to one area, and she has not noticed any similar rashes elsewhere on her body. She has not introduced any new lotions or detergents recently.  No pain.  States rash is right-sided neck.  No trouble swallowing.  No trouble breathing.    MEDICATIONS  Current: Tylenol  Past: Claritin    Objective   Vital Signs:   /80   Pulse 80   Ht 144.8 cm (57\")   Wt 72.2 kg (159 lb 1 oz)   SpO2 97%   BMI 34.42 kg/m²     Body mass index is 34.42 kg/m².    Review of Systems   Constitutional:  Negative for chills, fatigue and fever.   HENT:  Negative for congestion, ear discharge, rhinorrhea, sinus pressure, sneezing, sore throat, swollen glands and trouble swallowing.    Eyes:  Negative for visual disturbance.   Respiratory:  Negative for cough, shortness of breath and wheezing.    Cardiovascular:  Negative for chest pain.   Gastrointestinal:  Negative for abdominal pain, constipation, diarrhea, nausea and vomiting.   Genitourinary:  Negative for dysuria, " frequency and hematuria.   Musculoskeletal:  Negative for arthralgias and neck pain.   Skin:  Positive for rash.   Neurological:  Negative for headache.       Past History:  Medical History: has a past medical history of Anxiety and depression (2024), Anxiety state, Benign essential hypertension, Chronic obstructive lung disease, COPD (chronic obstructive pulmonary disease), Depressive disorder, Gastroesophageal reflux disease, Migraines, Psoriasis, TMJ (dislocation of temporomandibular joint), and Tobacco abuse.   Surgical History: has a past surgical history that includes  section.   Family History: family history includes Colon cancer in her maternal grandmother; Coronary artery disease in her paternal grandmother; Dementia in her father; Diabetes in her paternal grandmother and another family member; Hypertension in her father, mother, and another family member; Ovarian cancer in her paternal aunt.   Social History: reports that she quit smoking about 6 months ago. Her smoking use included cigarettes. She started smoking about 43 years ago. She has a 10.8 pack-year smoking history. She has been exposed to tobacco smoke. She has never used smokeless tobacco. She reports that she does not drink alcohol and does not use drugs.    PHQ-2 Depression Screening  Little interest or pleasure in doing things? Not at all   Feeling down, depressed, or hopeless? Not at all   PHQ-2 Total Score 0       PHQ-9 Depression Screening  Little interest or pleasure in doing things? Not at all   Feeling down, depressed, or hopeless? Not at all   PHQ-2 Total Score 0   Trouble falling or staying asleep, or sleeping too much?     Feeling tired or having little energy?     Poor appetite or overeating?     Feeling bad about yourself - or that you are a failure or have let yourself or your family down?     Trouble concentrating on things, such as reading the newspaper or watching television?     Moving or speaking so slowly that  other people could have noticed? Or the opposite - being so fidgety or restless that you have been moving around a lot more than usual?     Thoughts that you would be better off dead, or of hurting yourself in some way?     PHQ-9 Total Score     If you checked off any problems, how difficult have these problems made it for you to do your work, take care of things at home, or get along with other people? Not difficult at all        PHQ-9 Total Score:        Patient screened positive for depression based on a PHQ-9 score of  on . Follow-up recommendations include:          Current Outpatient Medications:     albuterol (PROVENTIL) 4 MG tablet, Take 2 tablets by mouth 2 (Two) Times a Day., Disp: , Rfl:     albuterol sulfate  (90 Base) MCG/ACT inhaler, Inhale 2 puffs Every 4 (Four) Hours As Needed for Wheezing., Disp: 8.5 g, Rfl: 1    buPROPion XL (WELLBUTRIN XL) 150 MG 24 hr tablet, TAKE 1 TABLET BY MOUTH DAILY, Disp: 30 tablet, Rfl: 5    famotidine (PEPCID) 40 MG tablet, TAKE 1 TABLET BY MOUTH DAILY, Disp: 90 tablet, Rfl: 1    FLUoxetine (PROzac) 20 MG capsule, Take 1 capsule by mouth Daily., Disp: , Rfl:     LORazepam (ATIVAN) 0.5 MG tablet, TAKE 1 TABLET BY MOUTH EVERY 8 HOURS AS NEEDED FOR ANXIETY, Disp: 30 tablet, Rfl: 2    losartan (Cozaar) 100 MG tablet, Take 1 tablet by mouth Daily., Disp: 90 tablet, Rfl: 3    metoprolol succinate XL (TOPROL-XL) 50 MG 24 hr tablet, Take 1 tablet by mouth Every Night., Disp: 90 tablet, Rfl: 3    montelukast (Singulair) 10 MG tablet, Take 1 tablet by mouth Every Night., Disp: 30 tablet, Rfl: 5    rosuvastatin (CRESTOR) 20 MG tablet, Take 1 tablet by mouth Daily., Disp: 90 tablet, Rfl: 3    Spiriva Respimat 2.5 MCG/ACT aerosol solution inhaler, INHALE 2 PUFFS BY MOUTH DAILY, Disp: 4 g, Rfl: 1    Symbicort 160-4.5 MCG/ACT inhaler, Inhale 2 puffs 2 (Two) Times a Day., Disp: , Rfl:     fluticasone (FLONASE) 50 MCG/ACT nasal spray, Administer 2 sprays into the nostril(s) as  directed by provider Daily., Disp: 16 g, Rfl: 2    loratadine (Claritin) 10 MG tablet, Take 1 tablet by mouth Daily., Disp: 30 tablet, Rfl: 2    predniSONE (DELTASONE) 20 MG tablet, Take 2 tabs PO qd x 4 days then 1 tab PO qd x 3 days then 0.5 tab Po qd x 2 days, Disp: 12 tablet, Rfl: 0    triamcinolone (KENALOG) 0.1 % cream, Apply 1 Application topically to the appropriate area as directed 2 (Two) Times a Day As Needed for Rash., Disp: 80 g, Rfl: 0   (Not in a hospital admission)     Allergies: Latex, Sulfa antibiotics, Sulfamethoxazole, and Trimethoprim    Physical Exam  Constitutional:       Appearance: Normal appearance.   HENT:      Right Ear: Ear canal and external ear normal.      Left Ear: Ear canal and external ear normal.      Ears:      Comments: Bilateral bulging tympanic membranes.  No redness.     Nose: Nose normal.      Mouth/Throat:      Mouth: Mucous membranes are moist.      Pharynx: Oropharynx is clear.   Cardiovascular:      Rate and Rhythm: Normal rate and regular rhythm.      Heart sounds: Normal heart sounds.   Pulmonary:      Effort: Pulmonary effort is normal.      Breath sounds: Normal breath sounds.   Skin:     General: Skin is warm.      Findings: Rash present.             Comments: Erythematous papular type rash present to right side of the neck as marked above.  Nontender.  No open skin.  Patient states itches.  No discharge.  No lip swelling.  No tongue swelling.  No trouble swallowing.   Neurological:      General: No focal deficit present.      Mental Status: She is alert and oriented to person, place, and time. Mental status is at baseline.   Psychiatric:         Mood and Affect: Mood normal.         Behavior: Behavior normal.         Thought Content: Thought content normal.         Judgment: Judgment normal.        Physical Exam  Fluid is present in both ears, with the right ear having more fluid than the left. No infection noted.    Result Review :          Results                Assessment and Plan    Diagnoses and all orders for this visit:    1. Rash (Primary)  -     predniSONE (DELTASONE) 20 MG tablet; Take 2 tabs PO qd x 4 days then 1 tab PO qd x 3 days then 0.5 tab Po qd x 2 days  Dispense: 12 tablet; Refill: 0  -     triamcinolone (KENALOG) 0.1 % cream; Apply 1 Application topically to the appropriate area as directed 2 (Two) Times a Day As Needed for Rash.  Dispense: 80 g; Refill: 0    2. Ear fullness, bilateral  -     loratadine (Claritin) 10 MG tablet; Take 1 tablet by mouth Daily.  Dispense: 30 tablet; Refill: 2  -     fluticasone (FLONASE) 50 MCG/ACT nasal spray; Administer 2 sprays into the nostril(s) as directed by provider Daily.  Dispense: 16 g; Refill: 2  -     predniSONE (DELTASONE) 20 MG tablet; Take 2 tabs PO qd x 4 days then 1 tab PO qd x 3 days then 0.5 tab Po qd x 2 days  Dispense: 12 tablet; Refill: 0    3. Elevated glucose  -     POC Glucose; Future      Assessment & Plan  1. Otalgia.  The etiology of the otalgia is likely due to allergies causing fluid accumulation in the ears. There is no evidence of infection. A prescription for steroids will be provided to alleviate the fluid accumulation. She is advised to avoid NSAIDS such as ibuprofen or Aleve while on steroids but may continue using Tylenol PRN. A blood glucose test will be conducted today to ensure it is within normal limits before initiating another course of steroids. She is advised to take Claritin and Flonase daily to prevent recurrence. If the condition persists, the next step would be referring her to ENT so she will keep me updated.  Risk of meds discussed and understood.  Education provided.  Return to clinic or ED with any issues or concerns.    2. Rash.  The rash could be due to an environmental exposure or contact with a specific substance. The prescribed steroids should also help resolve the rash. If the rash continues to flare up and recur, a referral to a dermatologist will be considered for  further evaluation and management.  Return in 2 to 3 days if no improvement, sooner if worsens.  Return to clinic or ED with any issues or concerns.                    Follow Up   Return if symptoms worsen or fail to improve.  Patient was given instructions and counseling regarding her condition or for health maintenance advice. Please see specific information pulled into the AVS if appropriate.     Patient or patient representative verbalized consent for the use of Ambient Listening during the visit with  RIKCEY Costa for chart documentation. 1/7/2025  14:10 EST    RICKEY Costa

## 2025-01-28 RX ORDER — TIOTROPIUM BROMIDE INHALATION SPRAY 3.12 UG/1
SPRAY, METERED RESPIRATORY (INHALATION)
Qty: 4 G | Refills: 3 | Status: SHIPPED | OUTPATIENT
Start: 2025-01-28

## 2025-02-10 ENCOUNTER — CLINICAL SUPPORT (OUTPATIENT)
Dept: CARDIOLOGY | Facility: CLINIC | Age: 65
End: 2025-02-10
Payer: MEDICARE

## 2025-02-10 ENCOUNTER — OFFICE VISIT (OUTPATIENT)
Dept: CARDIOLOGY | Facility: CLINIC | Age: 65
End: 2025-02-10
Payer: MEDICARE

## 2025-02-10 ENCOUNTER — TELEPHONE (OUTPATIENT)
Dept: CARDIOLOGY | Facility: CLINIC | Age: 65
End: 2025-02-10

## 2025-02-10 VITALS
RESPIRATION RATE: 18 BRPM | BODY MASS INDEX: 34.47 KG/M2 | HEIGHT: 57 IN | OXYGEN SATURATION: 97 % | WEIGHT: 159.8 LBS | DIASTOLIC BLOOD PRESSURE: 82 MMHG | SYSTOLIC BLOOD PRESSURE: 154 MMHG

## 2025-02-10 VITALS
OXYGEN SATURATION: 97 % | SYSTOLIC BLOOD PRESSURE: 160 MMHG | HEIGHT: 57 IN | BODY MASS INDEX: 34.47 KG/M2 | WEIGHT: 159.8 LBS | HEART RATE: 71 BPM | RESPIRATION RATE: 18 BRPM | DIASTOLIC BLOOD PRESSURE: 80 MMHG

## 2025-02-10 DIAGNOSIS — E78.5 HYPERLIPIDEMIA LDL GOAL <100: Primary | ICD-10-CM

## 2025-02-10 DIAGNOSIS — E66.3 OVERWEIGHT WITH BODY MASS INDEX (BMI) 25.0-29.9: ICD-10-CM

## 2025-02-10 DIAGNOSIS — I10 ESSENTIAL HYPERTENSION: Primary | ICD-10-CM

## 2025-02-10 DIAGNOSIS — I50.31 ACUTE DIASTOLIC (CONGESTIVE) HEART FAILURE: ICD-10-CM

## 2025-02-10 DIAGNOSIS — E78.5 HYPERLIPIDEMIA LDL GOAL <100: ICD-10-CM

## 2025-02-10 DIAGNOSIS — I10 ESSENTIAL HYPERTENSION: ICD-10-CM

## 2025-02-10 DIAGNOSIS — R07.89 CHEST DISCOMFORT: ICD-10-CM

## 2025-02-10 RX ORDER — AMLODIPINE BESYLATE 5 MG/1
5 TABLET ORAL DAILY
Qty: 90 TABLET | Refills: 3 | Status: SHIPPED | OUTPATIENT
Start: 2025-02-10

## 2025-02-10 RX ORDER — FAMOTIDINE 40 MG/1
40 TABLET, FILM COATED ORAL DAILY
Qty: 90 TABLET | Refills: 0 | Status: SHIPPED | OUTPATIENT
Start: 2025-02-10

## 2025-02-10 RX ORDER — ASPIRIN 81 MG/1
81 TABLET ORAL DAILY
Qty: 90 TABLET | Refills: 3 | Status: SHIPPED | OUTPATIENT
Start: 2025-02-10

## 2025-02-10 RX ORDER — AMLODIPINE BESYLATE 5 MG/1
5 TABLET ORAL DAILY
Qty: 90 TABLET | Refills: 3 | Status: SHIPPED | OUTPATIENT
Start: 2025-02-10 | End: 2025-02-10 | Stop reason: SDUPTHER

## 2025-02-10 NOTE — PROGRESS NOTES
[facility]  [facility] C[facility]OLOG[facility]  [address] [place] [address]  Dept: [phone]  Dept Fax: [phone]    Skye Barlow  1960    Follow Up Office Visit Note    History of Present Illness:  Skye Barlow is a 64 y.o. female who presents to the clinic for Follow-up. Chest discomfort and SOB, she walk in today complaining of SOB and chest tightness. BP is 160.80,  she claims taking all her meds, she has been under emotional stress on Losartan 100 mg and  also Toprol xl 50 mg, EKG sinus HR 78 her cardiac exam is normal, she did have a stress nuclear normal in  and recent  Ct chest has faint calcifications of the coronary arteries, will control Bp first, will add Amlodipine 5mg advised to take ASA 81 mg daily, will see her in 1 month    The following portions of the patient's history were reviewed and updated as appropriate: allergies, current medications, past family history, past medical history, past social history, past surgical history, and problem list.    Medications:  albuterol  albuterol sulfate HFA  amLODIPine  aspirin  buPROPion XL  famotidine  FLUoxetine  LORazepam  losartan  metoprolol succinate XL  rosuvastatin  Spiriva Respimat aerosol solution  Symbicort aerosol  triamcinolone    Subjective  Allergies   Allergen Reactions    Latex Rash    Sulfa Antibiotics Hives    Sulfamethoxazole Rash    Trimethoprim Rash        Past Medical History:   Diagnosis Date    Anxiety and depression 2024    Anxiety state     Benign essential hypertension     Chronic obstructive lung disease     COPD (chronic obstructive pulmonary disease)     Depressive disorder     Gastroesophageal reflux disease     Migraines     Psoriasis     TMJ (dislocation of temporomandibular joint)     Tobacco abuse        Past Surgical History:   Procedure Laterality Date     SECTION      X3       Family History   Problem Relation Age of Onset    Hypertension Mother      "Hypertension Father     Dementia Father     Colon cancer Maternal Grandmother     Coronary artery disease Paternal Grandmother     Diabetes Paternal Grandmother     Ovarian cancer Paternal Aunt     Hypertension Other     Diabetes Other     Breast cancer Neg Hx         Social History     Socioeconomic History    Marital status:    Tobacco Use    Smoking status: Former     Current packs/day: 0.00     Average packs/day: 0.3 packs/day for 43.2 years (10.8 ttl pk-yrs)     Types: Cigarettes     Start date: 4/15/1981     Quit date: 2024     Years since quittin.6     Passive exposure: Current    Smokeless tobacco: Never    Tobacco comments:     Down to 2-3 cigarettes a day    Vaping Use    Vaping status: Never Used   Substance and Sexual Activity    Alcohol use: Never    Drug use: Never    Sexual activity: Defer       Review of Systems   Constitutional: Negative.    HENT: Negative.     Respiratory:  Positive for shortness of breath.    Cardiovascular:  Positive for chest pain.   Endocrine: Negative.    Genitourinary: Negative.    Musculoskeletal: Negative.    Skin: Negative.    Allergic/Immunologic: Negative.    Neurological: Negative.    Hematological: Negative.    Psychiatric/Behavioral: Negative.       Cardiovascular Procedures    ECHO/MUGA:  STRESS TESTS:   CARDIAC CATH:   DEVICES:   HOLTER:   CT/MRI:   VASCULAR:   CARDIOTHORACIC:     Objective  Vitals:    02/10/25 1039 02/10/25 1049   BP: 154/79 160/80   BP Location: Right arm    Patient Position: Sitting    Cuff Size: Adult    Pulse: 71    Resp: 18    SpO2: 97%    Weight: 72.5 kg (159 lb 12.8 oz)    Height: 144.8 cm (57\")    PainSc: 0-No pain      Body mass index is 34.58 kg/m².     Physical Exam  Vitals reviewed.   Constitutional:       Appearance: Healthy appearance. Not in distress.   Neck:      Vascular: No JVR. JVD normal.   Pulmonary:      Effort: Pulmonary effort is normal.      Breath sounds: Normal breath sounds. No wheezing. No rhonchi. No " rales.   Chest:      Chest wall: Not tender to palpatation.   Cardiovascular:      PMI at left midclavicular line. Normal rate. Regular rhythm. Normal S1. Normal S2.       Murmurs: There is no murmur.      No gallop.  No click. No rub.   Pulses:     Intact distal pulses.   Edema:     Peripheral edema absent.   Abdominal:      General: Bowel sounds are normal.      Palpations: Abdomen is soft.      Tenderness: There is no abdominal tenderness.   Musculoskeletal: Normal range of motion.         General: No tenderness. Skin:     General: Skin is warm and dry.   Neurological:      General: No focal deficit present.      Mental Status: Alert and oriented to person, place and time.        Diagnostic Data    ECG 12 Lead    Date/Time: 2/10/2025 11:15 AM  Performed by: Andre Burt MD    Authorized by: Andre Burt MD  Comparison: compared with previous ECG from 12/20/2024  Similar to previous ECG  Rhythm: sinus rhythm  Rate: normal  BPM: 78  QRS axis: normal    Clinical impression: normal ECG      Assessment and Plan  Diagnoses and all orders for this visit:    Essential hypertension- BP is 160.80 will add Amlodipine 5 mg, will keep Losartan 100 mg and Toprol xl 50 mg EKG sinus     Hyperlipidemia LDL goal <100- on Crestor will get lipids  -     CBC & Differential  -     Comprehensive Metabolic Panel  -     Lipid Panel  -     proBNP  -     Troponin T  -     Lipoprotein A (LPA)  -     Apolipoprotein B    Acute diastolic (congestive) heart failure- she complaints of SOB, she has COPd, no edema, will get a BNP and troponin  -     proBNP.  Chest discomfort- EKG no changes, stress nuclear 2020 normal has mild Ct calcifications of the coronary arteries, , will observe possible related to elevated BP    Other orders  -     amLODIPine (NORVASC) 5 MG tablet; Take 1 tablet by mouth Daily.  -     aspirin 81 MG EC tablet; Take 1 tablet by mouth Daily.         Return in about 1 month (around 3/10/2025) for Recheck  with Dr. Burt.    Andre Burt MD  02/10/2025

## 2025-02-10 NOTE — PROGRESS NOTES
Venipuncture Blood Specimen Collection  Venipuncture performed in clinic by Abbey Lund RN with good hemostasis. Patient tolerated the procedure well without complications.   02/10/25   Abbey Lund RN

## 2025-02-10 NOTE — TELEPHONE ENCOUNTER
[facility] IN [place]     PT IS TRYING TO GET HER AMPLODPINE FILLED AND CAN'T TILL TOMORROW     REQUESTING LAUREN IN [place]     PLEASE ADVISE PT

## 2025-02-11 LAB
ALBUMIN SERPL-MCNC: 4.4 G/DL (ref 3.9–4.9)
ALP SERPL-CCNC: 84 IU/L (ref 44–121)
ALT SERPL-CCNC: 16 IU/L (ref 0–32)
AST SERPL-CCNC: 19 IU/L (ref 0–40)
BASOPHILS # BLD AUTO: 0 X10E3/UL (ref 0–0.2)
BASOPHILS NFR BLD AUTO: 1 %
BILIRUB SERPL-MCNC: 0.5 MG/DL (ref 0–1.2)
BUN SERPL-MCNC: 8 MG/DL (ref 8–27)
BUN/CREAT SERPL: 10 (ref 12–28)
CALCIUM SERPL-MCNC: 9.2 MG/DL (ref 8.7–10.3)
CHLORIDE SERPL-SCNC: 98 MMOL/L (ref 96–106)
CHOLEST SERPL-MCNC: 158 MG/DL (ref 100–199)
CO2 SERPL-SCNC: 24 MMOL/L (ref 20–29)
CREAT SERPL-MCNC: 0.83 MG/DL (ref 0.57–1)
EGFRCR SERPLBLD CKD-EPI 2021: 79 ML/MIN/1.73
EOSINOPHIL # BLD AUTO: 0.1 X10E3/UL (ref 0–0.4)
EOSINOPHIL NFR BLD AUTO: 2 %
ERYTHROCYTE [DISTWIDTH] IN BLOOD BY AUTOMATED COUNT: 12.1 % (ref 11.7–15.4)
GLOBULIN SER CALC-MCNC: 2 G/DL (ref 1.5–4.5)
GLUCOSE SERPL-MCNC: 93 MG/DL (ref 70–99)
HCT VFR BLD AUTO: 38.4 % (ref 34–46.6)
HDLC SERPL-MCNC: 78 MG/DL
HGB BLD-MCNC: 12.8 G/DL (ref 11.1–15.9)
IMM GRANULOCYTES # BLD AUTO: 0 X10E3/UL (ref 0–0.1)
IMM GRANULOCYTES NFR BLD AUTO: 0 %
LDLC SERPL CALC-MCNC: 67 MG/DL (ref 0–99)
LPA SERPL-SCNC: 121.6 NMOL/L
LYMPHOCYTES # BLD AUTO: 0.8 X10E3/UL (ref 0.7–3.1)
LYMPHOCYTES NFR BLD AUTO: 17 %
MCH RBC QN AUTO: 31.2 PG (ref 26.6–33)
MCHC RBC AUTO-ENTMCNC: 33.3 G/DL (ref 31.5–35.7)
MCV RBC AUTO: 94 FL (ref 79–97)
MONOCYTES # BLD AUTO: 0.5 X10E3/UL (ref 0.1–0.9)
MONOCYTES NFR BLD AUTO: 12 %
NEUTROPHILS # BLD AUTO: 3 X10E3/UL (ref 1.4–7)
NEUTROPHILS NFR BLD AUTO: 68 %
NT-PROBNP SERPL-MCNC: 344 PG/ML (ref 0–287)
PLATELET # BLD AUTO: 400 X10E3/UL (ref 150–450)
POTASSIUM SERPL-SCNC: 4.7 MMOL/L (ref 3.5–5.2)
PROT SERPL-MCNC: 6.4 G/DL (ref 6–8.5)
RBC # BLD AUTO: 4.1 X10E6/UL (ref 3.77–5.28)
SODIUM SERPL-SCNC: 134 MMOL/L (ref 134–144)
TRIGL SERPL-MCNC: 67 MG/DL (ref 0–149)
TROPONIN T SERPL HS-MCNC: 26 NG/L (ref 0–14)
VLDLC SERPL CALC-MCNC: 13 MG/DL (ref 5–40)
WBC # BLD AUTO: 4.5 X10E3/UL (ref 3.4–10.8)

## 2025-02-12 ENCOUNTER — TELEPHONE (OUTPATIENT)
Dept: CARDIOLOGY | Facility: CLINIC | Age: 65
End: 2025-02-12
Payer: MEDICARE

## 2025-02-12 DIAGNOSIS — G89.29 CHRONIC CHEST PAIN WITH LOW TO MODERATE RISK FOR CAD: Primary | ICD-10-CM

## 2025-02-12 DIAGNOSIS — Z91.89 CHRONIC CHEST PAIN WITH LOW TO MODERATE RISK FOR CAD: Primary | ICD-10-CM

## 2025-02-12 DIAGNOSIS — R07.9 CHRONIC CHEST PAIN WITH LOW TO MODERATE RISK FOR CAD: Primary | ICD-10-CM

## 2025-02-12 DIAGNOSIS — R79.89 ELEVATED TROPONIN: ICD-10-CM

## 2025-02-12 LAB — APO B SERPL-MCNC: 56 MG/DL

## 2025-02-12 NOTE — TELEPHONE ENCOUNTER
Unable to leave message. HUB to transfer call to the office. Called patient to schedule nuclear stress test in the office ordered by Dr Burt.

## 2025-02-12 NOTE — TELEPHONE ENCOUNTER
----- Message from Andre Burt sent at 2/11/2025  3:13 PM EST -----  BNP and troponin mildly elevated, please set her for a stress nuclear, no sure if she will be able to walk on the treadmill, make sure she takes asa 81 mg daily, her Bp was high and also can cause the SOB and chest discomfort, but we need to look into the coronary arteries specially the troponin is mildly elevated

## 2025-02-12 NOTE — TELEPHONE ENCOUNTER
Spoke with Ms Barlow and went over all her lab results:  BNP and troponin mildly elevated, please set her for a stress nuclear, no sure if she will be able to walk on the treadmill, make sure she takes asa 81 mg daily, her Bp was high and also can cause the SOB and chest discomfort, but we need to look into the coronary arteries specially the troponin is mildly elevated   She is agreeable to the nuclear stress.

## 2025-02-14 NOTE — PROGRESS NOTES
Venipuncture Blood Specimen Collection  Venipuncture performed in clinic with good hemostasis. Patient tolerated the procedure well without complications.   02/14/25   Rosa Moreno RN

## 2025-02-24 ENCOUNTER — OFFICE VISIT (OUTPATIENT)
Dept: FAMILY MEDICINE CLINIC | Facility: CLINIC | Age: 65
End: 2025-02-24
Payer: MEDICARE

## 2025-02-24 VITALS
BODY MASS INDEX: 34.47 KG/M2 | OXYGEN SATURATION: 94 % | WEIGHT: 159.8 LBS | TEMPERATURE: 98.1 F | HEIGHT: 57 IN | SYSTOLIC BLOOD PRESSURE: 142 MMHG | HEART RATE: 110 BPM | DIASTOLIC BLOOD PRESSURE: 76 MMHG

## 2025-02-24 DIAGNOSIS — L03.115 CELLULITIS OF RIGHT LOWER EXTREMITY: ICD-10-CM

## 2025-02-24 DIAGNOSIS — R21 RASH: Primary | ICD-10-CM

## 2025-02-24 PROCEDURE — 99214 OFFICE O/P EST MOD 30 MIN: CPT | Performed by: NURSE PRACTITIONER

## 2025-02-24 PROCEDURE — 1160F RVW MEDS BY RX/DR IN RCRD: CPT | Performed by: NURSE PRACTITIONER

## 2025-02-24 PROCEDURE — 3077F SYST BP >= 140 MM HG: CPT | Performed by: NURSE PRACTITIONER

## 2025-02-24 PROCEDURE — 1126F AMNT PAIN NOTED NONE PRSNT: CPT | Performed by: NURSE PRACTITIONER

## 2025-02-24 PROCEDURE — 1159F MED LIST DOCD IN RCRD: CPT | Performed by: NURSE PRACTITIONER

## 2025-02-24 PROCEDURE — 3078F DIAST BP <80 MM HG: CPT | Performed by: NURSE PRACTITIONER

## 2025-02-24 RX ORDER — TRIAMCINOLONE ACETONIDE 1 MG/G
1 CREAM TOPICAL 2 TIMES DAILY
Qty: 30 G | Refills: 0 | Status: SHIPPED | OUTPATIENT
Start: 2025-02-24

## 2025-02-24 RX ORDER — CLOBETASOL PROPIONATE 0.5 MG/ML
SOLUTION TOPICAL 2 TIMES DAILY
Qty: 50 ML | Refills: 1 | Status: SHIPPED | OUTPATIENT
Start: 2025-02-24

## 2025-02-24 RX ORDER — CEPHALEXIN 500 MG/1
500 CAPSULE ORAL 3 TIMES DAILY
Qty: 30 CAPSULE | Refills: 0 | Status: SHIPPED | OUTPATIENT
Start: 2025-02-24

## 2025-02-24 NOTE — PROGRESS NOTES
"Chief Complaint  Leg Pain (C/o rash on the right leg onset 2 weeks. ) and Rash (C/o rash in head. )    Subjective          Skye Barlow presents to [facility]  History of Present Illness  Pt has had a rash on her right lower leg for the past 2 weeks. She has been diagnosed with psoriasis in the past. She has a few areas behind her ears.  Leg Pain     Rash  Pertinent negatives include no fatigue, fever or shortness of breath.       History of Present Illness      Objective   Vital Signs:   /76   Pulse 110   Temp 98.1 °F (36.7 °C) (Oral)   Ht 144.8 cm (57\")   Wt 72.5 kg (159 lb 12.8 oz)   SpO2 94%   BMI 34.58 kg/m²     Body mass index is 34.58 kg/m².    Review of Systems   Constitutional:  Negative for fatigue and fever.   Respiratory:  Negative for shortness of breath.    Cardiovascular:  Negative for chest pain, palpitations and leg swelling.   Skin:  Positive for rash.   Neurological:  Negative for syncope.   Psychiatric/Behavioral:  The patient is not nervous/anxious.           Current Outpatient Medications:     albuterol (PROVENTIL) 4 MG tablet, Take 2 tablets by mouth 2 (Two) Times a Day., Disp: , Rfl:     albuterol sulfate  (90 Base) MCG/ACT inhaler, Inhale 2 puffs Every 4 (Four) Hours As Needed for Wheezing., Disp: 8.5 g, Rfl: 1    amLODIPine (NORVASC) 5 MG tablet, Take 1 tablet by mouth Daily., Disp: 90 tablet, Rfl: 3    aspirin 81 MG EC tablet, Take 1 tablet by mouth Daily., Disp: 90 tablet, Rfl: 3    buPROPion XL (WELLBUTRIN XL) 150 MG 24 hr tablet, TAKE 1 TABLET BY MOUTH DAILY, Disp: 30 tablet, Rfl: 5    famotidine (PEPCID) 40 MG tablet, TAKE 1 TABLET BY MOUTH DAILY, Disp: 90 tablet, Rfl: 0    FLUoxetine (PROzac) 20 MG capsule, Take 1 capsule by mouth Daily., Disp: 90 capsule, Rfl: 3    LORazepam (ATIVAN) 0.5 MG tablet, TAKE 1 TABLET BY MOUTH EVERY 8 HOURS AS NEEDED FOR ANXIETY, Disp: 30 tablet, Rfl: 2    losartan (Cozaar) 100 MG tablet, Take 1 tablet " by mouth Daily., Disp: 90 tablet, Rfl: 3    metoprolol succinate XL (TOPROL-XL) 50 MG 24 hr tablet, Take 1 tablet by mouth Every Night., Disp: 90 tablet, Rfl: 3    rosuvastatin (CRESTOR) 20 MG tablet, Take 1 tablet by mouth Daily., Disp: 90 tablet, Rfl: 3    Symbicort 160-4.5 MCG/ACT inhaler, Inhale 2 puffs 2 (Two) Times a Day., Disp: , Rfl:     tiotropium bromide monohydrate (Spiriva Respimat) 2.5 MCG/ACT aerosol solution inhaler, INHALE 2 PUFFS BY MOUTH DAILY, Disp: 4 g, Rfl: 3    cephalexin (Keflex) 500 MG capsule, Take 1 capsule by mouth 3 (Three) Times a Day., Disp: 30 capsule, Rfl: 0    clobetasol (TEMOVATE) 0.05 % external solution, Apply  topically to the appropriate area as directed 2 (Two) Times a Day., Disp: 50 mL, Rfl: 1    triamcinolone (KENALOG) 0.1 % cream, Apply 1 Application topically to the appropriate area as directed 2 (Two) Times a Day., Disp: 30 g, Rfl: 0      Allergies: Latex, Sulfa antibiotics, Sulfamethoxazole, and Trimethoprim    Physical Exam  Constitutional:       Appearance: Normal appearance.   HENT:      Head: Normocephalic.   Eyes:      Conjunctiva/sclera: Conjunctivae normal.      Pupils: Pupils are equal, round, and reactive to light.   Cardiovascular:      Rate and Rhythm: Normal rate and regular rhythm.      Heart sounds: Normal heart sounds.   Pulmonary:      Effort: Pulmonary effort is normal.      Breath sounds: Normal breath sounds.   Abdominal:      Tenderness: There is no abdominal tenderness.   Musculoskeletal:         General: Normal range of motion.   Skin:     General: Skin is warm and dry.      Capillary Refill: Capillary refill takes less than 2 seconds.      Comments: Scaling erythematous area on right medial lower leg   Neurological:      General: No focal deficit present.      Mental Status: She is alert and oriented to person, place, and time.   Psychiatric:         Mood and Affect: Mood normal.         Behavior: Behavior normal.         Thought Content: Thought  content normal.         Judgment: Judgment normal.          Physical Exam         Result Review :                Results            Assessment and Plan    Diagnoses and all orders for this visit:    1. Rash (Primary)  Comments:  Apply Triamcinolone cream. Cover with Keflex. Clobetasol scalp solution. F/U with dermatology if not improving.  Orders:  -     clobetasol (TEMOVATE) 0.05 % external solution; Apply  topically to the appropriate area as directed 2 (Two) Times a Day.  Dispense: 50 mL; Refill: 1  -     triamcinolone (KENALOG) 0.1 % cream; Apply 1 Application topically to the appropriate area as directed 2 (Two) Times a Day.  Dispense: 30 g; Refill: 0    2. Cellulitis of right lower extremity  Comments:  Finish antibiotics. RTC for worsened sx and if not improving in 1 week.  Orders:  -     cephalexin (Keflex) 500 MG capsule; Take 1 capsule by mouth 3 (Three) Times a Day.  Dispense: 30 capsule; Refill: 0                  Follow Up   Return in about 1 week (around 3/3/2025) for if not improving or sooner if symptoms worsen.  Patient was given instructions and counseling regarding her condition or for health maintenance advice. Please see specific information pulled into the AVS if appropriate.     RICKEY Lion

## 2025-02-25 ENCOUNTER — TELEPHONE (OUTPATIENT)
Dept: CARDIOLOGY | Facility: CLINIC | Age: 65
End: 2025-02-25
Payer: MEDICARE

## 2025-02-26 NOTE — TELEPHONE ENCOUNTER
BP was high during stress test, she is on Toprol and Losartan and recently we added the Amlodipine, has she been taking all the 3 meds, per Dr trinh     Spoke with pt she is doing ok and she is taking all medication

## 2025-03-03 ENCOUNTER — OFFICE VISIT (OUTPATIENT)
Dept: FAMILY MEDICINE CLINIC | Facility: CLINIC | Age: 65
End: 2025-03-03
Payer: MEDICARE

## 2025-03-03 VITALS
BODY MASS INDEX: 36.46 KG/M2 | HEIGHT: 57 IN | HEART RATE: 73 BPM | OXYGEN SATURATION: 98 % | DIASTOLIC BLOOD PRESSURE: 80 MMHG | SYSTOLIC BLOOD PRESSURE: 124 MMHG | WEIGHT: 169 LBS

## 2025-03-03 DIAGNOSIS — F06.4 ANXIETY DISORDER DUE TO GENERAL MEDICAL CONDITION: ICD-10-CM

## 2025-03-03 DIAGNOSIS — J43.2 CENTRILOBULAR EMPHYSEMA: ICD-10-CM

## 2025-03-03 DIAGNOSIS — I10 ESSENTIAL HYPERTENSION: Primary | ICD-10-CM

## 2025-03-03 RX ORDER — FAMOTIDINE 40 MG/1
40 TABLET, FILM COATED ORAL DAILY
Qty: 90 TABLET | Refills: 3 | Status: SHIPPED | OUTPATIENT
Start: 2025-03-03

## 2025-03-04 NOTE — PROGRESS NOTES
Office Note     Name: Skye Barlow    : 1960     MRN: [id number]     Chief Complaint  Med Refill    Subjective     History of Present Illness:  Skye Barlow is a 64 y.o. female who presents today for anxiety, COPD had not smoked in 7 months.  She is sleeping well she talked some about her granddaughter    Review of Systems:   Review of Systems    Past Medical History:   Past Medical History:   Diagnosis Date    Anxiety and depression 2024    Anxiety state     Benign essential hypertension     Chronic obstructive lung disease     COPD (chronic obstructive pulmonary disease)     Depressive disorder     Gastroesophageal reflux disease     Migraines     Psoriasis     TMJ (dislocation of temporomandibular joint)     Tobacco abuse        Past Surgical History:   Past Surgical History:   Procedure Laterality Date     SECTION      X3       Family History:   Family History   Problem Relation Age of Onset    Hypertension Mother     Hypertension Father     Dementia Father     Colon cancer Maternal Grandmother     Coronary artery disease Paternal Grandmother     Diabetes Paternal Grandmother     Ovarian cancer Paternal Aunt     Hypertension Other     Diabetes Other     Breast cancer Neg Hx        Social History:   Social History     Socioeconomic History    Marital status:    Tobacco Use    Smoking status: Former     Current packs/day: 0.00     Average packs/day: 0.3 packs/day for 43.2 years (10.8 ttl pk-yrs)     Types: Cigarettes     Start date: 4/15/1981     Quit date: 2024     Years since quittin.6     Passive exposure: Past    Smokeless tobacco: Never    Tobacco comments:     Down to 2-3 cigarettes a day    Vaping Use    Vaping status: Never Used   Substance and Sexual Activity    Alcohol use: Never    Drug use: Never    Sexual activity: Defer       Immunizations:   Immunization History   Administered Date(s) Administered    COVID-19 (PFIZER) Purple Cap Monovalent 2021,  10/11/2021    Flu Vaccine Quad PF >36MO 12/09/2019, 10/25/2021    Fluzone (or Fluarix & Flulaval for VFC) >6mos 12/09/2019, 10/25/2021    Fluzone Quad >6mos (Multi-dose) 11/12/2020    Influenza Inj MDCK Preserative Free 12/30/2024    Influenza Injectable Mdck Pf Quad 11/08/2022, 10/31/2023    Influenza, Unspecified 11/08/2022    Pneumococcal Conjugate 13-Valent (PCV13) 12/05/2017    Pneumococcal Polysaccharide (PPSV23) 07/09/2019    Tdap 11/25/2014        Medications:     Current Outpatient Medications:     albuterol (PROVENTIL) 4 MG tablet, Take 2 tablets by mouth 2 (Two) Times a Day., Disp: , Rfl:     albuterol sulfate  (90 Base) MCG/ACT inhaler, Inhale 2 puffs Every 4 (Four) Hours As Needed for Wheezing., Disp: 8.5 g, Rfl: 1    amLODIPine (NORVASC) 5 MG tablet, Take 1 tablet by mouth Daily., Disp: 90 tablet, Rfl: 3    aspirin 81 MG EC tablet, Take 1 tablet by mouth Daily., Disp: 90 tablet, Rfl: 3    buPROPion XL (WELLBUTRIN XL) 150 MG 24 hr tablet, TAKE 1 TABLET BY MOUTH DAILY, Disp: 30 tablet, Rfl: 5    cephalexin (Keflex) 500 MG capsule, Take 1 capsule by mouth 3 (Three) Times a Day., Disp: 30 capsule, Rfl: 0    clobetasol (TEMOVATE) 0.05 % external solution, Apply  topically to the appropriate area as directed 2 (Two) Times a Day., Disp: 50 mL, Rfl: 1    famotidine (PEPCID) 40 MG tablet, Take 1 tablet by mouth Daily., Disp: 90 tablet, Rfl: 3    FLUoxetine (PROzac) 20 MG capsule, Take 1 capsule by mouth Daily., Disp: 90 capsule, Rfl: 3    LORazepam (ATIVAN) 0.5 MG tablet, TAKE 1 TABLET BY MOUTH EVERY 8 HOURS AS NEEDED FOR ANXIETY, Disp: 30 tablet, Rfl: 2    losartan (Cozaar) 100 MG tablet, Take 1 tablet by mouth Daily., Disp: 90 tablet, Rfl: 3    metoprolol succinate XL (TOPROL-XL) 50 MG 24 hr tablet, Take 1 tablet by mouth Every Night., Disp: 90 tablet, Rfl: 3    rosuvastatin (CRESTOR) 20 MG tablet, Take 1 tablet by mouth Daily., Disp: 90 tablet, Rfl: 3    Symbicort 160-4.5 MCG/ACT inhaler, Inhale 2  "puffs 2 (Two) Times a Day., Disp: , Rfl:     tiotropium bromide monohydrate (Spiriva Respimat) 2.5 MCG/ACT aerosol solution inhaler, INHALE 2 PUFFS BY MOUTH DAILY, Disp: 4 g, Rfl: 3    triamcinolone (KENALOG) 0.1 % cream, Apply 1 Application topically to the appropriate area as directed 2 (Two) Times a Day., Disp: 30 g, Rfl: 0    Allergies:   Allergies   Allergen Reactions    Latex Rash    Sulfa Antibiotics Hives    Sulfamethoxazole Rash    Trimethoprim Rash       Objective     Vital Signs  /80   Pulse 73   Ht 144.8 cm (57\")   Wt 76.7 kg (169 lb)   SpO2 98%   BMI 36.57 kg/m²   Estimated body mass index is 36.57 kg/m² as calculated from the following:    Height as of this encounter: 144.8 cm (57\").    Weight as of this encounter: 76.7 kg (169 lb).            Physical Exam     Procedures     Assessment and Plan     1. Anxiety disorder due to general medical condition  Follow-up with a refill    2. Essential hypertension  Controlled    3. Centrilobular emphysema  Controlled       Follow Up  Return in about 4 weeks (around 3/31/2025).    @[facility]  [address] [place] [id number] [phone]  "

## 2025-03-12 ENCOUNTER — OFFICE VISIT (OUTPATIENT)
Dept: CARDIOLOGY | Facility: CLINIC | Age: 65
End: 2025-03-12
Payer: MEDICARE

## 2025-03-12 VITALS
TEMPERATURE: 97 F | HEIGHT: 57 IN | WEIGHT: 169 LBS | DIASTOLIC BLOOD PRESSURE: 74 MMHG | SYSTOLIC BLOOD PRESSURE: 130 MMHG | RESPIRATION RATE: 18 BRPM | OXYGEN SATURATION: 99 % | BODY MASS INDEX: 36.46 KG/M2 | HEART RATE: 90 BPM

## 2025-03-12 DIAGNOSIS — E78.5 HYPERLIPIDEMIA LDL GOAL <100: ICD-10-CM

## 2025-03-12 DIAGNOSIS — I25.10 CORONARY ARTERY DISEASE INVOLVING NATIVE CORONARY ARTERY OF NATIVE HEART WITHOUT ANGINA PECTORIS: ICD-10-CM

## 2025-03-12 DIAGNOSIS — I10 ESSENTIAL HYPERTENSION: Primary | ICD-10-CM

## 2025-03-12 PROBLEM — E78.41 ELEVATED LIPOPROTEIN(A): Status: ACTIVE | Noted: 2025-03-12

## 2025-03-12 RX ORDER — METOPROLOL SUCCINATE 100 MG/1
100 TABLET, EXTENDED RELEASE ORAL NIGHTLY
Qty: 90 TABLET | Refills: 3 | Status: SHIPPED | OUTPATIENT
Start: 2025-03-12

## 2025-03-12 RX ORDER — CANDESARTAN 32 MG/1
32 TABLET ORAL DAILY
Qty: 90 TABLET | Refills: 3 | Status: SHIPPED | OUTPATIENT
Start: 2025-03-12

## 2025-03-12 NOTE — PROGRESS NOTES
[facility]  [facility]  [address] [place] [address]  Dept: [phone]  Dept Fax: [phone]    Skye Barolw  1960    Follow Up Office Visit Note    History of Present Illness:  Skye Barlow is a 64 y.o. female who presents to the clinic for Follow-up.Hypertension- BP is better still high 130 to 140 at home, here is 135.80 she is not longer having any CP, her stress nuclear was negative, her BNP and also troponin were likely related to uncontrol BP< now no chest pain, to better improved BP control, will change Losartan to candesartan 32 mg and also increase Toprol xl to 100 mg from 50 mg, keep taking Amlodipine 5 mg    The following portions of the patient's history were reviewed and updated as appropriate: allergies, current medications, past family history, past medical history, past social history, past surgical history, and problem list.    Medications:  albuterol  albuterol sulfate HFA  amLODIPine  aspirin  buPROPion XL  candesartan  cephalexin  clobetasol  Evolocumab solution auto-injector  famotidine  FLUoxetine  LORazepam  metoprolol succinate XL  rosuvastatin  Spiriva Respimat aerosol solution  Symbicort aerosol  triamcinolone    Subjective  Allergies   Allergen Reactions    Latex Rash    Sulfa Antibiotics Hives    Sulfamethoxazole Rash    Trimethoprim Rash        Past Medical History:   Diagnosis Date    Anxiety and depression 2024    Anxiety state     Benign essential hypertension     Chronic obstructive lung disease     COPD (chronic obstructive pulmonary disease)     Depressive disorder     Gastroesophageal reflux disease     Migraines     Psoriasis     TMJ (dislocation of temporomandibular joint)     Tobacco abuse        Past Surgical History:   Procedure Laterality Date     SECTION      X3       Family History   Problem Relation Age of Onset    Hypertension Mother     Hypertension Father     Dementia Father     Colon  "cancer Maternal Grandmother     Coronary artery disease Paternal Grandmother     Diabetes Paternal Grandmother     Ovarian cancer Paternal Aunt     Hypertension Other     Diabetes Other     Breast cancer Neg Hx         Social History     Socioeconomic History    Marital status:    Tobacco Use    Smoking status: Former     Current packs/day: 0.00     Average packs/day: 0.3 packs/day for 43.2 years (10.8 ttl pk-yrs)     Types: Cigarettes     Start date: 4/15/1981     Quit date: 2024     Years since quittin.6     Passive exposure: Past    Smokeless tobacco: Never    Tobacco comments:     Down to 2-3 cigarettes a day    Vaping Use    Vaping status: Never Used   Substance and Sexual Activity    Alcohol use: Never    Drug use: Never    Sexual activity: Defer       Review of Systems   Constitutional: Negative.    HENT: Negative.     Respiratory: Negative.     Cardiovascular: Negative.    Endocrine: Negative.    Genitourinary: Negative.    Musculoskeletal: Negative.    Skin: Negative.    Allergic/Immunologic: Negative.    Neurological: Negative.    Hematological: Negative.    Psychiatric/Behavioral: Negative.       Cardiovascular Procedures    ECHO/MUGA:  STRESS TESTS:   CARDIAC CATH:   DEVICES:   HOLTER:   CT/MRI:   VASCULAR:   CARDIOTHORACIC:     Objective  Vitals:    25 1000   BP: 130/74   BP Location: Right arm   Patient Position: Lying   Cuff Size: Adult   Pulse: 90   Resp: 18   Temp: 97 °F (36.1 °C)   TempSrc: Infrared   SpO2: 99%   Weight: 76.7 kg (169 lb)   Height: 144.8 cm (57\")   PainSc: 0-No pain     Body mass index is 36.57 kg/m².     Physical Exam  Vitals reviewed.   Constitutional:       Appearance: Healthy appearance. Not in distress.   Neck:      Vascular: No JVR. JVD normal.   Pulmonary:      Effort: Pulmonary effort is normal.      Breath sounds: Normal breath sounds. No wheezing. No rhonchi. No rales.   Chest:      Chest wall: Not tender to palpatation.   Cardiovascular:      PMI at " left midclavicular line. Normal rate. Regular rhythm. Normal S1. Normal S2.       Murmurs: There is no murmur.      No gallop.  No click. No rub.   Pulses:     Intact distal pulses.   Edema:     Peripheral edema absent.   Abdominal:      General: Bowel sounds are normal.      Palpations: Abdomen is soft.      Tenderness: There is no abdominal tenderness.   Musculoskeletal: Normal range of motion.         General: No tenderness. Skin:     General: Skin is warm and dry.   Neurological:      General: No focal deficit present.      Mental Status: Alert and oriented to person, place and time.        Diagnostic Data  Procedures    Assessment and Plan  Diagnoses and all orders for this visit:    Essential hypertension- BP is better still we can do better we can change the Losartan to Candesartan 32 mg, keep Amlodipine 5 mg and increase Toprol xl to 100 mg    Hyperlipidemia LDL goal <100- On Crestor , Lipids are good  on Crestor 20 mg but has Lpa elevated, will use Repatha    Coronary artery disease involving native coronary artery of native heart without angina pectoris- calcifications by CT with negative stress test nuclear   Elevated Lpa- will use repatha    Other orders  -     candesartan (ATACAND) 32 MG tablet; Take 1 tablet by mouth Daily.  -     metoprolol succinate XL (TOPROL-XL) 100 MG 24 hr tablet; Take 1 tablet by mouth Every Night.  -     Evolocumab (REPATHA) solution auto-injector SureClick injection; Inject 1 mL under the skin into the appropriate area as directed Every 14 (Fourteen) Days.         Return in about 6 months (around 9/12/2025) for Recheck with Dr. Burt.    Andre Burt MD  03/12/2025

## 2025-03-14 ENCOUNTER — TELEPHONE (OUTPATIENT)
Dept: CARDIOLOGY | Facility: CLINIC | Age: 65
End: 2025-03-14
Payer: MEDICARE

## 2025-03-14 NOTE — TELEPHONE ENCOUNTER
Repatha sureclick   Outcome  [facility]  [place] Case: [id number], Status: Approved, Coverage Starts on: 1/1/2025 12:00:00 AM, Coverage Ends on: 12/31/2025 12:00:00 AM. Questions? Contact [phone].  Effective Date: 1/1/2025  Authorization Expiration Date: 12/31/2025

## 2025-03-21 ENCOUNTER — CLINICAL SUPPORT (OUTPATIENT)
Dept: CARDIOLOGY | Facility: CLINIC | Age: 65
End: 2025-03-21
Payer: MEDICARE

## 2025-03-31 ENCOUNTER — OFFICE VISIT (OUTPATIENT)
Dept: FAMILY MEDICINE CLINIC | Facility: CLINIC | Age: 65
End: 2025-03-31
Payer: MEDICARE

## 2025-03-31 VITALS
HEIGHT: 57 IN | BODY MASS INDEX: 34.52 KG/M2 | HEART RATE: 69 BPM | OXYGEN SATURATION: 98 % | SYSTOLIC BLOOD PRESSURE: 122 MMHG | WEIGHT: 160 LBS | DIASTOLIC BLOOD PRESSURE: 82 MMHG

## 2025-03-31 DIAGNOSIS — J43.8 OTHER EMPHYSEMA: ICD-10-CM

## 2025-03-31 DIAGNOSIS — I10 ESSENTIAL HYPERTENSION: Primary | ICD-10-CM

## 2025-03-31 DIAGNOSIS — F41.9 ANXIETY AND DEPRESSION: ICD-10-CM

## 2025-03-31 DIAGNOSIS — F32.A ANXIETY AND DEPRESSION: ICD-10-CM

## 2025-03-31 DIAGNOSIS — F06.4 ANXIETY DISORDER DUE TO GENERAL MEDICAL CONDITION: ICD-10-CM

## 2025-03-31 RX ORDER — LORAZEPAM 0.5 MG/1
TABLET ORAL
Qty: 30 TABLET | Refills: 2 | Status: SHIPPED | OUTPATIENT
Start: 2025-03-31

## 2025-03-31 NOTE — PROGRESS NOTES
Follow Up Office Visit      Date of Visit:  2025   Patient Name: Skye Barlow  : 1960   MRN: [id number]     Chief Complaint:    Chief Complaint   Patient presents with    Medicare Wellness-subsequent     physical   Didn't have time for that    History of Present Illness: Skye Barlow is a 64 y.o. female who is here today for follow up.    History of Present Illness  The patient presents for evaluation of COPD, anxiety, depression, left shoulder pain, and medication management.    She reports an improvement in her respiratory function, attributing it to the pulmonary rehabilitation program she has been attending. Despite receiving a letter from the program, she has not yet contacted Dr. Christopher's office. She expresses discomfort with gym attendance but acknowledges the benefits of the program, including enhanced self-esteem and physical capabilities. The program includes treadmill exercises, ski and bicycle activities, 15 consecutive sit-to-stands, 3-pound weight lifting, and balance exercises, which have significantly improved her balance. She also mentions a weight loss of 6 pounds. She is allergic to CIGARETTE SMOKE.    She is seeking refills for her Xanax and Prozac prescriptions. She is considering discontinuing either Prozac or Wellbutrin due to concerns about potential seizures, even though she has no history of seizures. She describes experiencing episodes of immobility, which she finds distressing. She continues to struggle with depression and anxiety, occasionally requiring two doses of lorazepam.    She has a genetic predisposition to high cholesterol, which increases her risk of heart attack or stroke. Consequently, her metoprolol dosage was increased from 50 mg to 100 mg, and she was transitioned from losartan to candesartan, with instructions to complete her losartan course before starting candesartan. She was also advised to take two 50 mg tablets of metoprolol daily until  the supply is exhausted. Additionally, she administers Repatha injections biweekly, a task occasionally performed by her daughter.    She reports recurrent bruising in the same area, which appears and disappears intermittently.    She is experiencing arm pain, possibly related to a rotator cuff injury or a previous fall. She has undergone an MRI of her left shoulder and was recommended for surgery, but she is currently reluctant to proceed with this option.    FAMILY HISTORY  Her daughter had a seizure in the past.    ALLERGIES  She is allergic to CIGARETTE SMOKE.    MEDICATIONS  Current: Xanax, Prozac, Wellbutrin, metoprolol, candesartan, losartan (to be finished before starting candesartan), Repatha, lorazepam.      Subjective      Review of Systems:   Review of Systems    Past Medical History:   Past Medical History:   Diagnosis Date    Anxiety and depression 2024    Anxiety state     Benign essential hypertension     Chronic obstructive lung disease     COPD (chronic obstructive pulmonary disease)     Depressive disorder     Gastroesophageal reflux disease     Migraines     Psoriasis     TMJ (dislocation of temporomandibular joint)     Tobacco abuse        Past Surgical History:   Past Surgical History:   Procedure Laterality Date     SECTION      X3       Family History:   Family History   Problem Relation Age of Onset    Hypertension Mother     Hypertension Father     Dementia Father     Colon cancer Maternal Grandmother     Coronary artery disease Paternal Grandmother     Diabetes Paternal Grandmother     Ovarian cancer Paternal Aunt     Hypertension Other     Diabetes Other     Breast cancer Neg Hx        Social History:   Social History     Socioeconomic History    Marital status:    Tobacco Use    Smoking status: Former     Current packs/day: 0.00     Average packs/day: 0.3 packs/day for 43.2 years (10.8 ttl pk-yrs)     Types: Cigarettes     Start date: 4/15/1981     Quit date: 2024      Years since quittin.7     Passive exposure: Past    Smokeless tobacco: Never    Tobacco comments:     Down to 2-3 cigarettes a day    Vaping Use    Vaping status: Never Used   Substance and Sexual Activity    Alcohol use: Never    Drug use: Never    Sexual activity: Defer       Medications:     Current Outpatient Medications:     albuterol (PROVENTIL) 4 MG tablet, Take 2 tablets by mouth 2 (Two) Times a Day., Disp: , Rfl:     albuterol sulfate  (90 Base) MCG/ACT inhaler, Inhale 2 puffs Every 4 (Four) Hours As Needed for Wheezing., Disp: 8.5 g, Rfl: 1    amLODIPine (NORVASC) 5 MG tablet, Take 1 tablet by mouth Daily., Disp: 90 tablet, Rfl: 3    aspirin 81 MG EC tablet, Take 1 tablet by mouth Daily., Disp: 90 tablet, Rfl: 3    buPROPion XL (WELLBUTRIN XL) 150 MG 24 hr tablet, TAKE 1 TABLET BY MOUTH DAILY, Disp: 30 tablet, Rfl: 5    candesartan (ATACAND) 32 MG tablet, Take 1 tablet by mouth Daily., Disp: 90 tablet, Rfl: 3    clobetasol (TEMOVATE) 0.05 % external solution, Apply  topically to the appropriate area as directed 2 (Two) Times a Day., Disp: 50 mL, Rfl: 1    Evolocumab (REPATHA) solution auto-injector SureClick injection, Inject 1 mL under the skin into the appropriate area as directed Every 14 (Fourteen) Days., Disp: 6 mL, Rfl: 3    famotidine (PEPCID) 40 MG tablet, Take 1 tablet by mouth Daily., Disp: 90 tablet, Rfl: 3    FLUoxetine (PROzac) 20 MG capsule, Take 1 capsule by mouth Daily., Disp: 90 capsule, Rfl: 3    LORazepam (ATIVAN) 0.5 MG tablet, TAKE 1 TABLET BY MOUTH EVERY 8 HOURS AS NEEDED FOR ANXIETY, Disp: 30 tablet, Rfl: 2    metoprolol succinate XL (TOPROL-XL) 100 MG 24 hr tablet, Take 1 tablet by mouth Every Night., Disp: 90 tablet, Rfl: 3    rosuvastatin (CRESTOR) 20 MG tablet, Take 1 tablet by mouth Daily., Disp: 90 tablet, Rfl: 3    Symbicort 160-4.5 MCG/ACT inhaler, Inhale 2 puffs 2 (Two) Times a Day., Disp: , Rfl:     tiotropium bromide monohydrate (Spiriva Respimat) 2.5  "MCG/ACT aerosol solution inhaler, INHALE 2 PUFFS BY MOUTH DAILY, Disp: 4 g, Rfl: 3    triamcinolone (KENALOG) 0.1 % cream, Apply 1 Application topically to the appropriate area as directed 2 (Two) Times a Day., Disp: 30 g, Rfl: 0    Allergies:   Allergies   Allergen Reactions    Latex Rash    Sulfa Antibiotics Hives    Sulfamethoxazole Rash    Trimethoprim Rash       Objective     Physical Exam:  Vital Signs:   Vitals:    03/31/25 1442   BP: 122/82   Pulse: 69   SpO2: 98%   Weight: 72.6 kg (160 lb)   Height: 144.8 cm (57\")   PainSc: 0-No pain     Body mass index is 34.62 kg/m².     Physical Exam  Vitals and nursing note reviewed.   Constitutional:       Appearance: Normal appearance. She is normal weight.   HENT:      Head: Normocephalic.      Right Ear: External ear normal.      Left Ear: External ear normal.      Nose: Nose normal.   Eyes:      Pupils: Pupils are equal, round, and reactive to light.   Neck:      Vascular: No carotid bruit.   Cardiovascular:      Rate and Rhythm: Normal rate and regular rhythm.      Pulses: Normal pulses.      Heart sounds: Normal heart sounds.   Pulmonary:      Effort: Pulmonary effort is normal.      Breath sounds: Normal breath sounds.   Musculoskeletal:      Cervical back: Normal range of motion and neck supple.   Skin:     General: Skin is warm and dry.   Neurological:      Mental Status: She is alert.   Psychiatric:         Mood and Affect: Mood normal.       Physical Exam  Lungs are clear.    Vital Signs  Weight is 169 pounds.    Results      Procedures      Assessment / Plan      Assessment/Plan:   Diagnoses and all orders for this visit:    1. Essential hypertension (Primary)    2. Anxiety and depression    3. Other emphysema    4. Anxiety disorder due to general medical condition  -     LORazepam (ATIVAN) 0.5 MG tablet; TAKE 1 TABLET BY MOUTH EVERY 8 HOURS AS NEEDED FOR ANXIETY  Dispense: 30 tablet; Refill: 2       Assessment & Plan  1. Chronic Obstructive Pulmonary " Disease (COPD).  She reports improved breathing and has been participating in pulmonary rehab. She is advised to continue her current medications and pulmonary rehabilitation exercises. She is encouraged to consider joining the gym or the [employer] program for additional physical activity.    2. Anxiety.  She reports occasional use of lorazepam for anxiety, sometimes requiring two doses on particularly stressful days. She is advised to continue using lorazepam as needed but to avoid daily use to prevent dependency. A refill for Xanax will be provided.    3. Depression.  She is currently on Prozac and Wellbutrin. She is considering weaning off one of these medications due to difficulty managing multiple medications. It is recommended to gradually reduce Wellbutrin  mg by taking it every other day and monitoring for any changes in mood or withdrawal symptoms. Prozac should be continued as it has been effective without side effects.    4. Hypercholesterolemia.  She has a genetic predisposition to high cholesterol. Her metoprolol dosage has been increased from 50 mg to 100 mg. She has been switched from losartan to candesartan and is instructed to finish her current supply of losartan before starting candesartan. She is also on Repatha injections every two weeks, administered by her daughter.    5. Left Shoulder Pain.  She reports persistent pain in her left shoulder, possibly related to a previous injury or rotator cuff issue. An MRI has been done previously, and surgery was suggested, but she is not ready for surgery at this time. She is advised to follow up with her orthopedic specialist if the pain persists or worsens.    6. Medication Management.  A refill for Xanax will be provided. She is advised to continue her current medications, including Prozac and Wellbutrin, with a gradual reduction in Wellbutrin as discussed.    Follow Up:   No follow-ups on file.    Patient or patient representative verbalized  consent for the use of Ambient Listening during the visit with  Rod Stone MD for chart documentation. 3/31/2025  16:24 EDT     @[facility] [place]

## 2025-05-13 ENCOUNTER — OFFICE VISIT (OUTPATIENT)
Dept: FAMILY MEDICINE CLINIC | Facility: CLINIC | Age: 65
End: 2025-05-13
Payer: MEDICARE

## 2025-05-13 VITALS
BODY MASS INDEX: 36.24 KG/M2 | HEIGHT: 57 IN | DIASTOLIC BLOOD PRESSURE: 74 MMHG | OXYGEN SATURATION: 94 % | HEART RATE: 76 BPM | WEIGHT: 168 LBS | SYSTOLIC BLOOD PRESSURE: 120 MMHG

## 2025-05-13 DIAGNOSIS — I25.10 CORONARY ARTERY DISEASE INVOLVING NATIVE CORONARY ARTERY OF NATIVE HEART WITHOUT ANGINA PECTORIS: ICD-10-CM

## 2025-05-13 DIAGNOSIS — H93.8X3 EAR FULLNESS, BILATERAL: ICD-10-CM

## 2025-05-13 DIAGNOSIS — J44.1 COPD WITH ACUTE EXACERBATION: ICD-10-CM

## 2025-05-13 DIAGNOSIS — R06.2 WHEEZING: Primary | ICD-10-CM

## 2025-05-13 DIAGNOSIS — E78.5 HYPERLIPIDEMIA LDL GOAL <100: ICD-10-CM

## 2025-05-13 DIAGNOSIS — J44.1 COPD WITH EXACERBATION: ICD-10-CM

## 2025-05-13 PROCEDURE — 99214 OFFICE O/P EST MOD 30 MIN: CPT | Performed by: FAMILY MEDICINE

## 2025-05-13 PROCEDURE — 1160F RVW MEDS BY RX/DR IN RCRD: CPT | Performed by: FAMILY MEDICINE

## 2025-05-13 PROCEDURE — 1126F AMNT PAIN NOTED NONE PRSNT: CPT | Performed by: FAMILY MEDICINE

## 2025-05-13 PROCEDURE — 3074F SYST BP LT 130 MM HG: CPT | Performed by: FAMILY MEDICINE

## 2025-05-13 PROCEDURE — 3078F DIAST BP <80 MM HG: CPT | Performed by: FAMILY MEDICINE

## 2025-05-13 PROCEDURE — 1159F MED LIST DOCD IN RCRD: CPT | Performed by: FAMILY MEDICINE

## 2025-05-13 RX ORDER — BUDESONIDE AND FORMOTEROL FUMARATE DIHYDRATE 160; 4.5 UG/1; UG/1
2 AEROSOL RESPIRATORY (INHALATION) 2 TIMES DAILY
Qty: 10.2 G | Refills: 1 | Status: SHIPPED | OUTPATIENT
Start: 2025-05-13

## 2025-05-13 RX ORDER — BUPROPION HYDROCHLORIDE 150 MG/1
150 TABLET ORAL DAILY
Qty: 30 TABLET | Refills: 5 | Status: SHIPPED | OUTPATIENT
Start: 2025-05-13

## 2025-05-13 RX ORDER — ALBUTEROL SULFATE 0.83 MG/ML
2.5 SOLUTION RESPIRATORY (INHALATION) EVERY 4 HOURS PRN
Qty: 120 ML | Refills: 12 | Status: SHIPPED | OUTPATIENT
Start: 2025-05-13

## 2025-05-13 RX ORDER — PREDNISONE 20 MG/1
TABLET ORAL
Qty: 20 TABLET | Refills: 0 | Status: SHIPPED | OUTPATIENT
Start: 2025-05-13

## 2025-05-13 NOTE — PROGRESS NOTES
Follow Up Office Visit      Date of Visit:  2025   Patient Name: Skye Barlow  : 1960   MRN: [id number]     Chief Complaint:    Chief Complaint   Patient presents with    Foot Swelling     And legs  Off and on. Has gotten worse last 2 weeks       History of Present Illness: Skye Barlow is a 65 y.o. female who is here today for follow up.    History of Present Illness  The patient presents for evaluation of COPD, edema, and hyperlipidemia.    She reports experiencing fatigue, which she attributes to her recent travel schedule. Over the past week, she has been waking up at 4:00 AM and retiring to bed between 11:00 PM and 12:00 AM without any daytime naps. She has not had a COPD exacerbation for a long time. She has been using Wellbutrin on an alternate day basis. She has successfully completed a rehab test and is considering re-enrollment in the program. She has been smoking less than half a pack a day. She has been using albuterol tablets but is nearing the end of her Symbicort supply. She has not used her nebulizer for over 6 months.    She also reports significant edema in her lower extremities, specifically her legs and feet, which she suspects may be due to congestive heart failure or psoriasis. The swelling was going down and never got that bad. She has gained 8 pounds despite a decreased appetite and minimal salt intake at home. She recalls consuming sandwiches during her recent trip.    SOCIAL HISTORY  The patient admits to smoking less than half a pack a day.      Subjective      Review of Systems:   Review of Systems    Past Medical History:   Past Medical History:   Diagnosis Date    Anxiety and depression 2024    Anxiety state     Benign essential hypertension     Chronic obstructive lung disease     COPD (chronic obstructive pulmonary disease)     Depressive disorder     Gastroesophageal reflux disease     Migraines     Psoriasis     TMJ (dislocation of temporomandibular  joint)     Tobacco abuse        Past Surgical History:   Past Surgical History:   Procedure Laterality Date     SECTION      X3       Family History:   Family History   Problem Relation Age of Onset    Hypertension Mother     Hypertension Father     Dementia Father     Colon cancer Maternal Grandmother     Coronary artery disease Paternal Grandmother     Diabetes Paternal Grandmother     Ovarian cancer Paternal Aunt     Hypertension Other     Diabetes Other     Breast cancer Neg Hx        Social History:   Social History     Socioeconomic History    Marital status:    Tobacco Use    Smoking status: Former     Current packs/day: 0.00     Average packs/day: 0.3 packs/day for 43.2 years (10.8 ttl pk-yrs)     Types: Cigarettes     Start date: 4/15/1981     Quit date: 2024     Years since quittin.8     Passive exposure: Past    Smokeless tobacco: Never    Tobacco comments:     Down to 2-3 cigarettes a day    Vaping Use    Vaping status: Never Used   Substance and Sexual Activity    Alcohol use: Never    Drug use: Never    Sexual activity: Defer       Medications:     Current Outpatient Medications:     albuterol (PROVENTIL) 4 MG tablet, Take 2 tablets by mouth 2 (Two) Times a Day., Disp: , Rfl:     albuterol sulfate  (90 Base) MCG/ACT inhaler, Inhale 2 puffs Every 4 (Four) Hours As Needed for Wheezing., Disp: 8.5 g, Rfl: 1    amLODIPine (NORVASC) 5 MG tablet, Take 1 tablet by mouth Daily., Disp: 90 tablet, Rfl: 3    aspirin 81 MG EC tablet, Take 1 tablet by mouth Daily., Disp: 90 tablet, Rfl: 3    buPROPion XL (WELLBUTRIN XL) 150 MG 24 hr tablet, Take 1 tablet by mouth Daily., Disp: 30 tablet, Rfl: 5    candesartan (ATACAND) 32 MG tablet, Take 1 tablet by mouth Daily., Disp: 90 tablet, Rfl: 3    clobetasol (TEMOVATE) 0.05 % external solution, Apply  topically to the appropriate area as directed 2 (Two) Times a Day., Disp: 50 mL, Rfl: 1    Evolocumab (REPATHA) solution auto-injector SureClick  "injection, Inject 1 mL under the skin into the appropriate area as directed Every 14 (Fourteen) Days., Disp: 6 mL, Rfl: 3    famotidine (PEPCID) 40 MG tablet, Take 1 tablet by mouth Daily., Disp: 90 tablet, Rfl: 3    FLUoxetine (PROzac) 20 MG capsule, Take 1 capsule by mouth Daily., Disp: 90 capsule, Rfl: 3    LORazepam (ATIVAN) 0.5 MG tablet, TAKE 1 TABLET BY MOUTH EVERY 8 HOURS AS NEEDED FOR ANXIETY, Disp: 30 tablet, Rfl: 2    metoprolol succinate XL (TOPROL-XL) 100 MG 24 hr tablet, Take 1 tablet by mouth Every Night., Disp: 90 tablet, Rfl: 3    rosuvastatin (CRESTOR) 20 MG tablet, Take 1 tablet by mouth Daily., Disp: 90 tablet, Rfl: 3    Symbicort 160-4.5 MCG/ACT inhaler, Inhale 2 puffs 2 (Two) Times a Day., Disp: 10.2 g, Rfl: 1    tiotropium bromide monohydrate (Spiriva Respimat) 2.5 MCG/ACT aerosol solution inhaler, INHALE 2 PUFFS BY MOUTH DAILY, Disp: 4 g, Rfl: 3    triamcinolone (KENALOG) 0.1 % cream, Apply 1 Application topically to the appropriate area as directed 2 (Two) Times a Day., Disp: 30 g, Rfl: 0    albuterol (PROVENTIL) (2.5 MG/3ML) 0.083% nebulizer solution, Take 2.5 mg by nebulization Every 4 (Four) Hours As Needed for Wheezing., Disp: 120 mL, Rfl: 12    predniSONE (DELTASONE) 20 MG tablet, Use 2 day, Disp: 20 tablet, Rfl: 0    Allergies:   Allergies   Allergen Reactions    Latex Rash    Sulfa Antibiotics Hives    Sulfamethoxazole Rash    Trimethoprim Rash       Objective     Physical Exam:  Vital Signs:   Vitals:    05/13/25 1129   BP: 120/74   Pulse: 76   SpO2: 94%   Weight: 76.2 kg (168 lb)   Height: 144.8 cm (57\")     Body mass index is 36.35 kg/m².     Physical Exam  Vitals and nursing note reviewed.   Constitutional:       Appearance: Normal appearance. She is normal weight.   HENT:      Head: Normocephalic.      Right Ear: External ear normal.      Left Ear: External ear normal.      Nose: Nose normal.   Eyes:      Pupils: Pupils are equal, round, and reactive to light.   Neck:      " Vascular: No carotid bruit.   Cardiovascular:      Rate and Rhythm: Normal rate and regular rhythm.      Pulses: Normal pulses.      Heart sounds: Normal heart sounds.   Pulmonary:      Effort: Pulmonary effort is normal.      Breath sounds: Wheezing and rhonchi present.   Musculoskeletal:      Cervical back: Normal range of motion and neck supple.      Right lower leg: Edema present.      Left lower leg: Edema present.   Skin:     General: Skin is warm and dry.   Neurological:      Mental Status: She is alert.   Psychiatric:         Mood and Affect: Mood normal.       Physical Exam  Cardiovascular: Regular rate and rhythm, no murmurs, rubs, or gallops  Extremities: Bilateral leg and foot swelling  Skin: Psoriasis on leg    Results      Procedures      Assessment / Plan      Assessment/Plan:   Diagnoses and all orders for this visit:    1. Wheezing (Primary)    2. COPD with exacerbation    3. Hyperlipidemia LDL goal <100    4. Ear fullness, bilateral    5. Coronary artery disease involving native coronary artery of native heart without angina pectoris    6. COPD with acute exacerbation  -     Symbicort 160-4.5 MCG/ACT inhaler; Inhale 2 puffs 2 (Two) Times a Day.  Dispense: 10.2 g; Refill: 1    Other orders  -     predniSONE (DELTASONE) 20 MG tablet; Use 2 day  Dispense: 20 tablet; Refill: 0  -     buPROPion XL (WELLBUTRIN XL) 150 MG 24 hr tablet; Take 1 tablet by mouth Daily.  Dispense: 30 tablet; Refill: 5  -     albuterol (PROVENTIL) (2.5 MG/3ML) 0.083% nebulizer solution; Take 2.5 mg by nebulization Every 4 (Four) Hours As Needed for Wheezing.  Dispense: 120 mL; Refill: 12       Assessment & Plan  1. Chronic Obstructive Pulmonary Disease (COPD).  - She has not had a COPD exacerbation for a long time.  - Advised to quit smoking and reduce salt intake.  - Prescription for prednisone 20 mg, to be taken twice daily for 10 days, has been provided.  - Prescriptions for Symbicort, albuterol sulfate nebulizer solution,  and albuterol tablets have been renewed. Instructed to continue using Wellbutrin every other day.    2. Edema.  - Swelling in legs and feet may be related to COPD or congestive heart failure.  - Gained 8 pounds, likely due to fluid retention.  - Advised to reduce salt intake and monitor symptoms.  - Further evaluation will be necessary if swelling persists or worsens.    3. Hyperlipidemia.  - Currently on Repatha for cholesterol management.  - No recent blood work has been done.  - Advised to continue with Repatha and follow up with primary care physician for cholesterol monitoring.    Follow Up:   Return in about 3 months (around 8/13/2025).    Patient or patient representative verbalized consent for the use of Ambient Listening during the visit with  Rod Stone MD for chart documentation. 5/13/2025  12:10 EDT     @[email or web address] [place]

## 2025-05-28 RX ORDER — TIOTROPIUM BROMIDE INHALATION SPRAY 3.12 UG/1
2 SPRAY, METERED RESPIRATORY (INHALATION) DAILY
Qty: 4 G | Refills: 0 | Status: SHIPPED | OUTPATIENT
Start: 2025-05-28

## 2025-05-29 RX ORDER — METOPROLOL SUCCINATE 50 MG/1
50 TABLET, EXTENDED RELEASE ORAL DAILY
Qty: 90 TABLET | Refills: 3 | Status: SHIPPED | OUTPATIENT
Start: 2025-05-29 | End: 2025-05-30 | Stop reason: ALTCHOICE

## 2025-05-30 ENCOUNTER — OFFICE VISIT (OUTPATIENT)
Dept: CARDIOLOGY | Facility: CLINIC | Age: 65
End: 2025-05-30
Payer: MEDICARE

## 2025-05-30 VITALS
SYSTOLIC BLOOD PRESSURE: 134 MMHG | OXYGEN SATURATION: 99 % | DIASTOLIC BLOOD PRESSURE: 74 MMHG | BODY MASS INDEX: 34.73 KG/M2 | HEIGHT: 57 IN | HEART RATE: 92 BPM | WEIGHT: 161 LBS | RESPIRATION RATE: 18 BRPM

## 2025-05-30 DIAGNOSIS — E78.41 ELEVATED LIPOPROTEIN(A): ICD-10-CM

## 2025-05-30 DIAGNOSIS — E78.2 MIXED HYPERLIPIDEMIA: ICD-10-CM

## 2025-05-30 DIAGNOSIS — I10 ESSENTIAL HYPERTENSION: ICD-10-CM

## 2025-05-30 DIAGNOSIS — I25.10 CORONARY ARTERY DISEASE INVOLVING NATIVE CORONARY ARTERY OF NATIVE HEART WITHOUT ANGINA PECTORIS: Primary | ICD-10-CM

## 2025-05-30 PROBLEM — E66.3 OVERWEIGHT WITH BODY MASS INDEX (BMI) 25.0-29.9: Status: RESOLVED | Noted: 2021-12-15 | Resolved: 2025-05-30

## 2025-05-30 PROCEDURE — 99214 OFFICE O/P EST MOD 30 MIN: CPT | Performed by: INTERNAL MEDICINE

## 2025-05-30 PROCEDURE — 3075F SYST BP GE 130 - 139MM HG: CPT | Performed by: INTERNAL MEDICINE

## 2025-05-30 PROCEDURE — 3078F DIAST BP <80 MM HG: CPT | Performed by: INTERNAL MEDICINE

## 2025-05-30 PROCEDURE — 1159F MED LIST DOCD IN RCRD: CPT | Performed by: INTERNAL MEDICINE

## 2025-05-30 PROCEDURE — 1160F RVW MEDS BY RX/DR IN RCRD: CPT | Performed by: INTERNAL MEDICINE

## 2025-05-30 PROCEDURE — 93000 ELECTROCARDIOGRAM COMPLETE: CPT | Performed by: INTERNAL MEDICINE

## 2025-05-30 RX ORDER — CANDESARTAN 32 MG/1
32 TABLET ORAL DAILY
Qty: 90 TABLET | Refills: 3 | Status: SHIPPED | OUTPATIENT
Start: 2025-05-30

## 2025-05-30 RX ORDER — AMLODIPINE BESYLATE 5 MG/1
5 TABLET ORAL DAILY
Qty: 90 TABLET | Refills: 3 | Status: SHIPPED | OUTPATIENT
Start: 2025-05-30

## 2025-05-30 RX ORDER — ROSUVASTATIN CALCIUM 20 MG/1
20 TABLET, COATED ORAL DAILY
Qty: 90 TABLET | Refills: 3 | Status: SHIPPED | OUTPATIENT
Start: 2025-05-30

## 2025-05-30 RX ORDER — METOPROLOL SUCCINATE 100 MG/1
100 TABLET, EXTENDED RELEASE ORAL NIGHTLY
Qty: 90 TABLET | Refills: 3 | Status: SHIPPED | OUTPATIENT
Start: 2025-05-30

## 2025-05-30 NOTE — PROGRESS NOTES
[facility] [place]  [facility] CARDIOLOG[facility]  [address] [place] [address]  Dept: [phone]  Dept Fax: [phone]    Skye Barlow  1960    Follow Up Office Visit Note    History of Present Illness:  Skye Barlow is a 65 y.o. female who presents to the clinic for  edema has noticed mild edema, no SOB, no chest pain, BP is 130.80 likely related to Amlodipine, she just started Candesartan 32 mg today and also toprol xl 100 mg, will watch she was advised to let us know if the swelling get worse to change Amlodipine    The following portions of the patient's history were reviewed and updated as appropriate: allergies, current medications, past family history, past medical history, past social history, past surgical history, and problem list.    Medications:  albuterol  albuterol sulfate HFA  amLODIPine  aspirin  buPROPion XL  candesartan  clobetasol  Evolocumab solution auto-injector  famotidine  FLUoxetine  LORazepam  metoprolol succinate XL  predniSONE  rosuvastatin  Spiriva Respimat aerosol solution  Symbicort aerosol  triamcinolone    Subjective  Allergies   Allergen Reactions   • Latex Rash   • Sulfa Antibiotics Hives   • Sulfamethoxazole Rash   • Trimethoprim Rash        Past Medical History:   Diagnosis Date   • Anxiety and depression 2024   • Anxiety state    • Benign essential hypertension    • Chronic obstructive lung disease    • COPD (chronic obstructive pulmonary disease)    • Depressive disorder    • Gastroesophageal reflux disease    • Migraines    • Psoriasis    • TMJ (dislocation of temporomandibular joint)    • Tobacco abuse        Past Surgical History:   Procedure Laterality Date   •  SECTION      X3       Family History   Problem Relation Age of Onset   • Hypertension Mother    • Hypertension Father    • Dementia Father    • Colon cancer Maternal Grandmother    • Coronary artery disease Paternal Grandmother    • Diabetes Paternal Grandmother   "  • Ovarian cancer Paternal Aunt    • Hypertension Other    • Diabetes Other    • Breast cancer Neg Hx         Social History     Socioeconomic History   • Marital status:    Tobacco Use   • Smoking status: Former     Current packs/day: 0.00     Average packs/day: 0.3 packs/day for 43.2 years (10.8 ttl pk-yrs)     Types: Cigarettes     Start date: 4/15/1981     Quit date: 2024     Years since quittin.9     Passive exposure: Past   • Smokeless tobacco: Never   • Tobacco comments:     Down to 2-3 cigarettes a day    Vaping Use   • Vaping status: Never Used   Substance and Sexual Activity   • Alcohol use: Never   • Drug use: Never   • Sexual activity: Defer       Review of Systems   Constitutional: Negative.    HENT: Negative.     Respiratory: Negative.     Cardiovascular:  Positive for leg swelling.   Endocrine: Negative.    Genitourinary: Negative.    Musculoskeletal: Negative.    Skin: Negative.    Allergic/Immunologic: Negative.    Neurological: Negative.    Hematological: Negative.    Psychiatric/Behavioral: Negative.       Cardiovascular Procedures    ECHO/MUGA:  STRESS TESTS:   CARDIAC CATH:   DEVICES:   HOLTER:   CT/MRI:   VASCULAR:   CARDIOTHORACIC:     Objective  Vitals:    25 1400   BP: 134/74   BP Location: Right arm   Patient Position: Lying   Cuff Size: Adult   Pulse: 92   Resp: 18   SpO2: 99%   Weight: 73 kg (161 lb)   Height: 144.8 cm (57\")   PainSc: 0-No pain     Body mass index is 34.84 kg/m².     Physical Exam  Vitals reviewed.   Constitutional:       Appearance: Healthy appearance. Not in distress.   Neck:      Vascular: No JVR. JVD normal.   Pulmonary:      Effort: Pulmonary effort is normal.      Breath sounds: Normal breath sounds. No wheezing. No rhonchi. No rales.   Chest:      Chest wall: Not tender to palpatation.   Cardiovascular:      PMI at left midclavicular line. Normal rate. Regular rhythm. Normal S1. Normal S2.       Murmurs: There is no murmur.      No gallop.  No " click. No rub.   Pulses:     Intact distal pulses.   Edema:     Peripheral edema absent.   Abdominal:      General: Bowel sounds are normal.      Palpations: Abdomen is soft.      Tenderness: There is no abdominal tenderness.   Musculoskeletal: Normal range of motion.         General: No tenderness. Skin:     General: Skin is warm and dry.   Neurological:      General: No focal deficit present.      Mental Status: Alert and oriented to person, place and time.        Diagnostic Data    ECG 12 Lead    Date/Time: 5/30/2025 2:36 PM  Performed by: Andre Burt MD    Authorized by: Andre Burt MD  Comparison: compared with previous ECG from 2/10/2025  Similar to previous ECG  Rhythm: sinus rhythm  Rate: normal  BPM: 74  QRS axis: normal    Clinical impression: normal ECG        Assessment and Plan  Diagnoses and all orders for this visit:    Coronary artery disease involving native coronary artery of native heart without angina pectoris- Mil calcifications by Ct, no CP    Essential hypertension- BP is 130.80 will keep same meds, has mild edema likely from Amlodipine, we discuss about changing the meds  or watch and she decided now to watch on Amlodipine 5mg, Candesartan 32 mg and also Toprol xl 100 mg    Elevated lipoprotein(a)- on Repatha tolerates well    Mixed hyperlipidemia- On Crestor 20 mg,   -     rosuvastatin (CRESTOR) 20 MG tablet; Take 1 tablet by mouth Daily.    Other orders  -     amLODIPine (NORVASC) 5 MG tablet; Take 1 tablet by mouth Daily.  -     candesartan (ATACAND) 32 MG tablet; Take 1 tablet by mouth Daily.  -     Evolocumab (REPATHA) solution auto-injector SureClick injection; Inject 1 mL under the skin into the appropriate area as directed Every 14 (Fourteen) Days.  -     metoprolol succinate XL (TOPROL-XL) 100 MG 24 hr tablet; Take 1 tablet by mouth Every Night.         Return in about 6 months (around 11/30/2025) for Recheck with Dr. Burt.    Andre Burt MD  05/30/2025

## 2025-06-02 ENCOUNTER — OFFICE VISIT (OUTPATIENT)
Dept: FAMILY MEDICINE CLINIC | Facility: CLINIC | Age: 65
End: 2025-06-02
Payer: MEDICARE

## 2025-06-02 VITALS
BODY MASS INDEX: 33.58 KG/M2 | DIASTOLIC BLOOD PRESSURE: 78 MMHG | WEIGHT: 160 LBS | HEART RATE: 79 BPM | SYSTOLIC BLOOD PRESSURE: 116 MMHG | HEIGHT: 58 IN | OXYGEN SATURATION: 97 % | TEMPERATURE: 97.7 F

## 2025-06-02 DIAGNOSIS — J43.2 CENTRILOBULAR EMPHYSEMA: ICD-10-CM

## 2025-06-02 DIAGNOSIS — J40 BRONCHITIS: Primary | ICD-10-CM

## 2025-06-02 DIAGNOSIS — R05.1 ACUTE COUGH: ICD-10-CM

## 2025-06-02 DIAGNOSIS — J30.1 SEASONAL ALLERGIC RHINITIS DUE TO POLLEN: ICD-10-CM

## 2025-06-02 PROCEDURE — 87428 SARSCOV & INF VIR A&B AG IA: CPT | Performed by: NURSE PRACTITIONER

## 2025-06-02 PROCEDURE — 3074F SYST BP LT 130 MM HG: CPT | Performed by: NURSE PRACTITIONER

## 2025-06-02 PROCEDURE — 1160F RVW MEDS BY RX/DR IN RCRD: CPT | Performed by: NURSE PRACTITIONER

## 2025-06-02 PROCEDURE — 1159F MED LIST DOCD IN RCRD: CPT | Performed by: NURSE PRACTITIONER

## 2025-06-02 PROCEDURE — 1126F AMNT PAIN NOTED NONE PRSNT: CPT | Performed by: NURSE PRACTITIONER

## 2025-06-02 PROCEDURE — 3078F DIAST BP <80 MM HG: CPT | Performed by: NURSE PRACTITIONER

## 2025-06-02 PROCEDURE — 99214 OFFICE O/P EST MOD 30 MIN: CPT | Performed by: NURSE PRACTITIONER

## 2025-06-02 RX ORDER — LORATADINE 10 MG/1
10 TABLET ORAL DAILY
Qty: 90 TABLET | Refills: 1 | Status: SHIPPED | OUTPATIENT
Start: 2025-06-02

## 2025-06-02 RX ORDER — AMOXICILLIN 875 MG/1
875 TABLET, COATED ORAL 2 TIMES DAILY
Qty: 20 TABLET | Refills: 0 | Status: SHIPPED | OUTPATIENT
Start: 2025-06-02

## 2025-06-02 RX ORDER — DEXTROMETHORPHAN HYDROBROMIDE AND PROMETHAZINE HYDROCHLORIDE 15; 6.25 MG/5ML; MG/5ML
5 SYRUP ORAL 4 TIMES DAILY PRN
Qty: 120 ML | Refills: 0 | Status: SHIPPED | OUTPATIENT
Start: 2025-06-02

## 2025-06-02 RX ORDER — PREDNISONE 20 MG/1
40 TABLET ORAL DAILY
Qty: 10 TABLET | Refills: 0 | Status: SHIPPED | OUTPATIENT
Start: 2025-06-02 | End: 2025-06-06

## 2025-06-02 NOTE — PROGRESS NOTES
"Chief Complaint  URI (Pt is here for uri sxs. )    Subjective          Skye Barlow presents to [facility]  History of Present Illness  Pt has had cough, congestion, wheezing, and shortness of breath since yesterday. She ran out of her Loratadine and feels this flared up her allergies which flared up her COPD. She denies fever or known sick contacts.   URI   Associated symptoms include congestion, coughing and wheezing. Pertinent negatives include no chest pain.       History of Present Illness      Objective   Vital Signs:   /78   Pulse 79   Temp 97.7 °F (36.5 °C) (Oral)   Ht 147.3 cm (58\")   Wt 72.6 kg (160 lb)   SpO2 97%   BMI 33.44 kg/m²     Body mass index is 33.44 kg/m².    Review of Systems   Constitutional:  Negative for chills, fatigue and fever.   HENT:  Positive for congestion.    Respiratory:  Positive for cough, shortness of breath and wheezing.    Cardiovascular:  Negative for chest pain, palpitations and leg swelling.   Neurological:  Negative for syncope.   Psychiatric/Behavioral:  The patient is not nervous/anxious.           Current Outpatient Medications:     albuterol (PROVENTIL) (2.5 MG/3ML) 0.083% nebulizer solution, Take 2.5 mg by nebulization Every 4 (Four) Hours As Needed for Wheezing., Disp: 120 mL, Rfl: 12    albuterol (PROVENTIL) 4 MG tablet, Take 2 tablets by mouth 2 (Two) Times a Day., Disp: , Rfl:     albuterol sulfate  (90 Base) MCG/ACT inhaler, Inhale 2 puffs Every 4 (Four) Hours As Needed for Wheezing., Disp: 8.5 g, Rfl: 1    amLODIPine (NORVASC) 5 MG tablet, Take 1 tablet by mouth Daily., Disp: 90 tablet, Rfl: 3    aspirin 81 MG EC tablet, Take 1 tablet by mouth Daily., Disp: 90 tablet, Rfl: 3    buPROPion XL (WELLBUTRIN XL) 150 MG 24 hr tablet, Take 1 tablet by mouth Daily., Disp: 30 tablet, Rfl: 5    candesartan (ATACAND) 32 MG tablet, Take 1 tablet by mouth Daily., Disp: 90 tablet, Rfl: 3    clobetasol (TEMOVATE) 0.05 % " external solution, Apply  topically to the appropriate area as directed 2 (Two) Times a Day., Disp: 50 mL, Rfl: 1    Evolocumab (REPATHA) solution auto-injector SureClick injection, Inject 1 mL under the skin into the appropriate area as directed Every 14 (Fourteen) Days., Disp: 6 mL, Rfl: 3    famotidine (PEPCID) 40 MG tablet, Take 1 tablet by mouth Daily., Disp: 90 tablet, Rfl: 3    FLUoxetine (PROzac) 20 MG capsule, Take 1 capsule by mouth Daily., Disp: 90 capsule, Rfl: 3    LORazepam (ATIVAN) 0.5 MG tablet, TAKE 1 TABLET BY MOUTH EVERY 8 HOURS AS NEEDED FOR ANXIETY, Disp: 30 tablet, Rfl: 2    metoprolol succinate XL (TOPROL-XL) 100 MG 24 hr tablet, Take 1 tablet by mouth Every Night., Disp: 90 tablet, Rfl: 3    rosuvastatin (CRESTOR) 20 MG tablet, Take 1 tablet by mouth Daily., Disp: 90 tablet, Rfl: 3    Symbicort 160-4.5 MCG/ACT inhaler, Inhale 2 puffs 2 (Two) Times a Day., Disp: 10.2 g, Rfl: 1    tiotropium bromide monohydrate (Spiriva Respimat) 2.5 MCG/ACT aerosol solution inhaler, INHALE 2 PUFFS BY MOUTH DAILY, Disp: 4 g, Rfl: 0    triamcinolone (KENALOG) 0.1 % cream, Apply 1 Application topically to the appropriate area as directed 2 (Two) Times a Day., Disp: 30 g, Rfl: 0    amoxicillin (AMOXIL) 875 MG tablet, Take 1 tablet by mouth 2 (Two) Times a Day., Disp: 20 tablet, Rfl: 0    loratadine (Claritin) 10 MG tablet, Take 1 tablet by mouth Daily., Disp: 90 tablet, Rfl: 1    predniSONE (DELTASONE) 20 MG tablet, Take 2 tablets by mouth Daily., Disp: 10 tablet, Rfl: 0    promethazine-dextromethorphan (PROMETHAZINE-DM) 6.25-15 MG/5ML syrup, Take 5 mL by mouth 4 (Four) Times a Day As Needed for Cough., Disp: 120 mL, Rfl: 0      Allergies: Latex, Sulfa antibiotics, Sulfamethoxazole, and Trimethoprim    Physical Exam  Constitutional:       Appearance: Normal appearance.   HENT:      Head: Normocephalic.   Eyes:      Conjunctiva/sclera: Conjunctivae normal.      Pupils: Pupils are equal, round, and reactive to  light.   Cardiovascular:      Rate and Rhythm: Normal rate and regular rhythm.      Heart sounds: Normal heart sounds.   Pulmonary:      Effort: Pulmonary effort is normal.      Breath sounds: Wheezing present.   Abdominal:      Tenderness: There is no abdominal tenderness.   Musculoskeletal:         General: Normal range of motion.   Skin:     General: Skin is warm and dry.      Capillary Refill: Capillary refill takes less than 2 seconds.   Neurological:      General: No focal deficit present.      Mental Status: She is alert and oriented to person, place, and time.   Psychiatric:         Mood and Affect: Mood normal.         Behavior: Behavior normal.         Thought Content: Thought content normal.         Judgment: Judgment normal.          Physical Exam         Result Review :                Results            Assessment and Plan    Diagnoses and all orders for this visit:    1. Bronchitis (Primary)  Comments:  Increase fluids. Mucinex and Phen DM PRN. Albuterol PRN and finish prednisone. Finish antibiotics. RTC for worsened sx and if not improving in 1 week.    2. Acute cough  -     POCT SARS-CoV-2 Antigen MARCUS + Flu    3. Centrilobular emphysema  Comments:  Albuterol PRN and finish prednisone. F/U with pulm as scheduled. Go to ER for worsened sx.    4. Seasonal allergic rhinitis due to pollen  Comments:  Restart Claritin.    Other orders  -     amoxicillin (AMOXIL) 875 MG tablet; Take 1 tablet by mouth 2 (Two) Times a Day.  Dispense: 20 tablet; Refill: 0  -     predniSONE (DELTASONE) 20 MG tablet; Take 2 tablets by mouth Daily.  Dispense: 10 tablet; Refill: 0  -     promethazine-dextromethorphan (PROMETHAZINE-DM) 6.25-15 MG/5ML syrup; Take 5 mL by mouth 4 (Four) Times a Day As Needed for Cough.  Dispense: 120 mL; Refill: 0  -     loratadine (Claritin) 10 MG tablet; Take 1 tablet by mouth Daily.  Dispense: 90 tablet; Refill: 1                  Follow Up   Return in about 1 week (around 6/9/2025) for if not  improving or sooner if symptoms worsen.  Patient was given instructions and counseling regarding her condition or for health maintenance advice. Please see specific information pulled into the AVS if appropriate.     RICKEY Lion

## 2025-06-06 ENCOUNTER — OFFICE VISIT (OUTPATIENT)
Dept: FAMILY MEDICINE CLINIC | Facility: CLINIC | Age: 65
End: 2025-06-06
Payer: MEDICARE

## 2025-06-06 VITALS
OXYGEN SATURATION: 96 % | TEMPERATURE: 97.6 F | WEIGHT: 163.19 LBS | DIASTOLIC BLOOD PRESSURE: 86 MMHG | HEIGHT: 58 IN | HEART RATE: 81 BPM | SYSTOLIC BLOOD PRESSURE: 116 MMHG | BODY MASS INDEX: 34.25 KG/M2

## 2025-06-06 DIAGNOSIS — J44.1 CHRONIC OBSTRUCTIVE PULMONARY DISEASE WITH ACUTE EXACERBATION: Primary | ICD-10-CM

## 2025-06-06 PROCEDURE — 1160F RVW MEDS BY RX/DR IN RCRD: CPT | Performed by: NURSE PRACTITIONER

## 2025-06-06 PROCEDURE — 3079F DIAST BP 80-89 MM HG: CPT | Performed by: NURSE PRACTITIONER

## 2025-06-06 PROCEDURE — 1159F MED LIST DOCD IN RCRD: CPT | Performed by: NURSE PRACTITIONER

## 2025-06-06 PROCEDURE — 99213 OFFICE O/P EST LOW 20 MIN: CPT | Performed by: NURSE PRACTITIONER

## 2025-06-06 PROCEDURE — 1126F AMNT PAIN NOTED NONE PRSNT: CPT | Performed by: NURSE PRACTITIONER

## 2025-06-06 PROCEDURE — 3074F SYST BP LT 130 MM HG: CPT | Performed by: NURSE PRACTITIONER

## 2025-06-06 RX ORDER — AZITHROMYCIN 250 MG/1
TABLET, FILM COATED ORAL
Qty: 6 TABLET | Refills: 0 | Status: SHIPPED | OUTPATIENT
Start: 2025-06-06 | End: 2025-06-11

## 2025-06-06 RX ORDER — PREDNISONE 20 MG/1
TABLET ORAL
Qty: 13 TABLET | Refills: 0 | Status: SHIPPED | OUTPATIENT
Start: 2025-06-06

## 2025-06-06 NOTE — PROGRESS NOTES
"Chief Complaint  Nasal Congestion    Subjective          Skye Barlow presents to [facility]  History of Present Illness  History of Present Illness  The patient is a 65-year-old female who presents for evaluation of a COPD exacerbation.    She was evaluated on 06/02/2025 by Rupal for this issue and was prescribed prednisone and amoxicillin. Initially, she responded to the treatment, but currently, she is experiencing cough, wheezing, and difficulty expectorating. She has been using her inhalers and nebulizer as part of her treatment regimen. She has a history of smoking, with her last cigarette consumed a few days ago. She has previously attempted to quit smoking twice. She has a known diagnosis of COPD and is under the care of a pulmonologist, Dr. Tubbs. She reports wheezing, which has worsened over time. She also reports sinus issues, which she attributes to blooming trees in her neighborhood and exposure to secondhand smoke from her . She has a history of pneumonia, but it was not severe enough to require hospitalization.    SOCIAL HISTORY  The patient recently stopped smoking a couple of days ago.    Patient Care Team:  Rod Stone MD as PCP - General (Family Medicine)  Andre Burt MD as Consulting Physician (Cardiology)     Objective   Vital Signs:   /86   Pulse 81   Temp 97.6 °F (36.4 °C)   Ht 147.3 cm (58\")   Wt 74 kg (163 lb 3 oz)   SpO2 96%   BMI 34.11 kg/m²     Body mass index is 34.11 kg/m².    Review of Systems   Constitutional:  Negative for chills, fatigue and fever.   HENT:  Positive for congestion. Negative for ear pain, postnasal drip, rhinorrhea, sinus pressure, sneezing, sore throat, swollen glands and trouble swallowing.    Eyes:  Negative for visual disturbance.   Respiratory:  Positive for cough and wheezing. Negative for shortness of breath.    Cardiovascular:  Negative for chest pain and palpitations. "   Gastrointestinal:  Negative for abdominal pain, diarrhea, nausea and vomiting.   Genitourinary:  Negative for decreased urine volume, dysuria, frequency, hematuria and urgency.   Musculoskeletal:  Negative for back pain.   Skin:  Negative for rash.   Neurological:  Negative for headache.       Past History:  Medical History: has a past medical history of Anxiety and depression (2024), Anxiety state, Benign essential hypertension, Chronic obstructive lung disease, COPD (chronic obstructive pulmonary disease), Depressive disorder, Gastroesophageal reflux disease, Migraines, Psoriasis, TMJ (dislocation of temporomandibular joint), and Tobacco abuse.   Surgical History: has a past surgical history that includes  section.   Family History: family history includes Colon cancer in her maternal grandmother; Coronary artery disease in her paternal grandmother; Dementia in her father; Diabetes in her paternal grandmother and another family member; Hypertension in her father, mother, and another family member; Ovarian cancer in her paternal aunt.   Social History: reports that she quit smoking about 11 months ago. Her smoking use included cigarettes. She started smoking about 44 years ago. She has a 10.8 pack-year smoking history. She has been exposed to tobacco smoke. She has never used smokeless tobacco. She reports that she does not drink alcohol and does not use drugs.    PHQ-2 Depression Screening  Little interest or pleasure in doing things?     Feeling down, depressed, or hopeless?     PHQ-2 Total Score         PHQ-9 Depression Screening  Little interest or pleasure in doing things?     Feeling down, depressed, or hopeless?     PHQ-2 Total Score     Trouble falling or staying asleep, or sleeping too much?     Feeling tired or having little energy?     Poor appetite or overeating?     Feeling bad about yourself - or that you are a failure or have let yourself or your family down?     Trouble concentrating  on things, such as reading the newspaper or watching television?     Moving or speaking so slowly that other people could have noticed? Or the opposite - being so fidgety or restless that you have been moving around a lot more than usual?     Thoughts that you would be better off dead, or of hurting yourself in some way?     PHQ-9 Total Score     If you checked off any problems, how difficult have these problems made it for you to do your work, take care of things at home, or get along with other people?          PHQ-9 Total Score:        Patient screened positive for depression based on a PHQ-9 score of  on . Follow-up recommendations include:          Current Outpatient Medications:     albuterol (PROVENTIL) (2.5 MG/3ML) 0.083% nebulizer solution, Take 2.5 mg by nebulization Every 4 (Four) Hours As Needed for Wheezing., Disp: 120 mL, Rfl: 12    albuterol (PROVENTIL) 4 MG tablet, Take 2 tablets by mouth 2 (Two) Times a Day., Disp: , Rfl:     albuterol sulfate  (90 Base) MCG/ACT inhaler, Inhale 2 puffs Every 4 (Four) Hours As Needed for Wheezing., Disp: 8.5 g, Rfl: 1    amLODIPine (NORVASC) 5 MG tablet, Take 1 tablet by mouth Daily., Disp: 90 tablet, Rfl: 3    amoxicillin (AMOXIL) 875 MG tablet, Take 1 tablet by mouth 2 (Two) Times a Day., Disp: 20 tablet, Rfl: 0    aspirin 81 MG EC tablet, Take 1 tablet by mouth Daily., Disp: 90 tablet, Rfl: 3    buPROPion XL (WELLBUTRIN XL) 150 MG 24 hr tablet, Take 1 tablet by mouth Daily., Disp: 30 tablet, Rfl: 5    candesartan (ATACAND) 32 MG tablet, Take 1 tablet by mouth Daily., Disp: 90 tablet, Rfl: 3    clobetasol (TEMOVATE) 0.05 % external solution, Apply  topically to the appropriate area as directed 2 (Two) Times a Day., Disp: 50 mL, Rfl: 1    Evolocumab (REPATHA) solution auto-injector SureClick injection, Inject 1 mL under the skin into the appropriate area as directed Every 14 (Fourteen) Days., Disp: 6 mL, Rfl: 3    famotidine (PEPCID) 40 MG tablet, Take 1  tablet by mouth Daily., Disp: 90 tablet, Rfl: 3    FLUoxetine (PROzac) 20 MG capsule, Take 1 capsule by mouth Daily., Disp: 90 capsule, Rfl: 3    loratadine (Claritin) 10 MG tablet, Take 1 tablet by mouth Daily., Disp: 90 tablet, Rfl: 1    LORazepam (ATIVAN) 0.5 MG tablet, TAKE 1 TABLET BY MOUTH EVERY 8 HOURS AS NEEDED FOR ANXIETY, Disp: 30 tablet, Rfl: 2    metoprolol succinate XL (TOPROL-XL) 100 MG 24 hr tablet, Take 1 tablet by mouth Every Night., Disp: 90 tablet, Rfl: 3    promethazine-dextromethorphan (PROMETHAZINE-DM) 6.25-15 MG/5ML syrup, Take 5 mL by mouth 4 (Four) Times a Day As Needed for Cough., Disp: 120 mL, Rfl: 0    rosuvastatin (CRESTOR) 20 MG tablet, Take 1 tablet by mouth Daily., Disp: 90 tablet, Rfl: 3    Symbicort 160-4.5 MCG/ACT inhaler, Inhale 2 puffs 2 (Two) Times a Day., Disp: 10.2 g, Rfl: 1    tiotropium bromide monohydrate (Spiriva Respimat) 2.5 MCG/ACT aerosol solution inhaler, INHALE 2 PUFFS BY MOUTH DAILY, Disp: 4 g, Rfl: 0    triamcinolone (KENALOG) 0.1 % cream, Apply 1 Application topically to the appropriate area as directed 2 (Two) Times a Day., Disp: 30 g, Rfl: 0    azithromycin (Zithromax Z-Colin) 250 MG tablet, Take 2 tablets by mouth Daily for 1 day, THEN 1 tablet Daily for 4 days., Disp: 6 tablet, Rfl: 0    predniSONE (DELTASONE) 20 MG tablet, Take 3 tablets by mouth once daily for 2 days, then 2 tablets by mouth once daily for 2 days, then 1 tablet by mouth daily for 2 day, then half tablet by mouth once daily for 2 days., Disp: 13 tablet, Rfl: 0   (Not in a hospital admission)     Allergies: Latex, Sulfa antibiotics, Sulfamethoxazole, and Trimethoprim    Physical Exam  Constitutional:       Appearance: Normal appearance.   HENT:      Right Ear: Tympanic membrane, ear canal and external ear normal.      Left Ear: Tympanic membrane, ear canal and external ear normal.      Nose: Nose normal.      Mouth/Throat:      Mouth: Mucous membranes are moist.      Pharynx: Oropharynx is  clear.   Eyes:      Conjunctiva/sclera: Conjunctivae normal.      Pupils: Pupils are equal, round, and reactive to light.   Cardiovascular:      Rate and Rhythm: Normal rate and regular rhythm.      Heart sounds: Normal heart sounds.   Pulmonary:      Effort: Pulmonary effort is normal. No respiratory distress.      Breath sounds: Wheezing present. No rhonchi or rales.   Skin:     General: Skin is warm.      Findings: No erythema.   Neurological:      General: No focal deficit present.      Mental Status: She is alert and oriented to person, place, and time. Mental status is at baseline.   Psychiatric:         Mood and Affect: Mood normal.         Behavior: Behavior normal.         Thought Content: Thought content normal.         Judgment: Judgment normal.        Physical Exam      Result Review :          Results               Assessment and Plan    Diagnoses and all orders for this visit:    1. Chronic obstructive pulmonary disease with acute exacerbation (Primary)  -     XR Chest PA & Lateral (In Office)  -     predniSONE (DELTASONE) 20 MG tablet; Take 3 tablets by mouth once daily for 2 days, then 2 tablets by mouth once daily for 2 days, then 1 tablet by mouth daily for 2 day, then half tablet by mouth once daily for 2 days.  Dispense: 13 tablet; Refill: 0  -     azithromycin (Zithromax Z-Colin) 250 MG tablet; Take 2 tablets by mouth Daily for 1 day, THEN 1 tablet Daily for 4 days.  Dispense: 6 tablet; Refill: 0      Assessment & Plan  1. Chronic Obstructive Pulmonary Disease (COPD) exacerbation.  - Seen on 06/02/2025 and prescribed prednisone and amoxicillin.  - Physical exam reveals wheezing; patient has been using inhalers and nebulizer.  - Chest x-ray ordered to rule out pneumonia.  Will stop amoxicillin and switch to a Z-Colin.  She will stop her current dose of prednisone and we will prescribe a higher taper dose.  Continue with inhalers and nebulizer as needed.  Encouraged her to continue to not smoke.  Risk  of meds discussed and understood.  Education provided.  Return in 2 days if no improvement, go to ED if worsens.  She knows to avoid all NSAIDs while on prednisone.  Return to clinic or ED with any issues or concerns.                        Follow Up   Return if symptoms worsen or fail to improve.  Patient was given instructions and counseling regarding her condition or for health maintenance advice. Please see specific information pulled into the AVS if appropriate.     Patient or patient representative verbalized consent for the use of Ambient Listening during the visit with  RICKEY Costa for chart documentation. 6/6/2025  11:56 EDT    RICKEY Costa

## 2025-06-27 RX ORDER — TIOTROPIUM BROMIDE INHALATION SPRAY 3.12 UG/1
2 SPRAY, METERED RESPIRATORY (INHALATION) DAILY
Qty: 4 G | Refills: 0 | Status: SHIPPED | OUTPATIENT
Start: 2025-06-27

## 2025-06-30 ENCOUNTER — OFFICE VISIT (OUTPATIENT)
Dept: FAMILY MEDICINE CLINIC | Facility: CLINIC | Age: 65
End: 2025-06-30
Payer: MEDICARE

## 2025-06-30 VITALS
WEIGHT: 160 LBS | BODY MASS INDEX: 33.58 KG/M2 | HEART RATE: 89 BPM | OXYGEN SATURATION: 98 % | SYSTOLIC BLOOD PRESSURE: 124 MMHG | DIASTOLIC BLOOD PRESSURE: 70 MMHG | HEIGHT: 58 IN

## 2025-06-30 DIAGNOSIS — B37.0 THRUSH, ORAL: ICD-10-CM

## 2025-06-30 DIAGNOSIS — J43.2 CENTRILOBULAR EMPHYSEMA: ICD-10-CM

## 2025-06-30 DIAGNOSIS — J44.1 COPD WITH EXACERBATION: Primary | ICD-10-CM

## 2025-06-30 DIAGNOSIS — J01.10 ACUTE NON-RECURRENT FRONTAL SINUSITIS: ICD-10-CM

## 2025-06-30 PROCEDURE — 1126F AMNT PAIN NOTED NONE PRSNT: CPT | Performed by: PHYSICIAN ASSISTANT

## 2025-06-30 PROCEDURE — 3074F SYST BP LT 130 MM HG: CPT | Performed by: PHYSICIAN ASSISTANT

## 2025-06-30 PROCEDURE — 3078F DIAST BP <80 MM HG: CPT | Performed by: PHYSICIAN ASSISTANT

## 2025-06-30 PROCEDURE — G2211 COMPLEX E/M VISIT ADD ON: HCPCS | Performed by: PHYSICIAN ASSISTANT

## 2025-06-30 PROCEDURE — 99214 OFFICE O/P EST MOD 30 MIN: CPT | Performed by: PHYSICIAN ASSISTANT

## 2025-06-30 RX ORDER — DOXYCYCLINE 100 MG/1
100 CAPSULE ORAL 2 TIMES DAILY WITH MEALS
COMMUNITY
Start: 2025-06-29

## 2025-06-30 RX ORDER — ALBUTEROL SULFATE 90 UG/1
2 INHALANT RESPIRATORY (INHALATION) EVERY 4 HOURS PRN
Qty: 8.5 G | Refills: 1 | Status: SHIPPED | OUTPATIENT
Start: 2025-06-30

## 2025-06-30 RX ORDER — NYSTATIN 100000 [USP'U]/ML
500000 SUSPENSION ORAL 4 TIMES DAILY
Qty: 60 ML | Refills: 0 | Status: SHIPPED | OUTPATIENT
Start: 2025-06-30 | End: 2025-07-07

## 2025-06-30 RX ORDER — BROMPHENIRAMINE MALEATE, PSEUDOEPHEDRINE HYDROCHLORIDE, AND DEXTROMETHORPHAN HYDROBROMIDE 2; 30; 10 MG/5ML; MG/5ML; MG/5ML
SYRUP ORAL
COMMUNITY
Start: 2025-06-29

## 2025-06-30 NOTE — PROGRESS NOTES
".Chief Complaint  COPD (Pt reports having breathing difficulty and COPD flare up, seen in [facility] Sunday, has been taking breathing treatments every 4 hours. )    Subjective          History of Present Illness  Skye Barlow is here today with her  History of Present Illness  The patient presents for evaluation of a suspected COPD flare-up.    A few weeks ago, she sought medical attention due to a suspected COPD exacerbation, which she believes was triggered by allergies and sinus issues. Despite completing a 5-day course of antibiotics and steroids, her symptoms persisted. She was advised to discontinue the initial antibiotic and was prescribed an alternative along with additional steroids. Her condition appeared to improve temporarily before deteriorating again. She reports experiencing both head and chest congestion, which she attributes to postnasal drainage. She describes a sensation of a tickle in her throat when eating, leading to coughing and expectoration of thick mucus from her head and nose. Certain smells can also trigger coughing and mucus production. She does not report any throat discomfort but mentions ear pain, particularly in the right ear, which has now become bilateral.    She has a history of seasonal allergies and is currently taking Claritin but is not using any nasal sprays. She requires supplemental oxygen during ambulation. She sought urgent care on 06/29/2025 where she was prescribed doxycycline and steroids, with a warning that hospitalization may be necessary if these treatments prove ineffective.She has not seen her pulmonologist recently. She reports tongue soreness but has no history of oral thrush.    Objective   Vital Signs:   /70   Pulse 89   Ht 147.3 cm (58\")   Wt 72.6 kg (160 lb)   SpO2 98%   BMI 33.44 kg/m²     Body mass index is 33.44 kg/m².         Review of Systems      Current Outpatient Medications:   •  albuterol (PROVENTIL) (2.5 MG/3ML) 0.083% nebulizer " solution, Take 2.5 mg by nebulization Every 4 (Four) Hours As Needed for Wheezing., Disp: 120 mL, Rfl: 12  •  albuterol (PROVENTIL) 4 MG tablet, Take 2 tablets by mouth 2 (Two) Times a Day., Disp: , Rfl:   •  albuterol sulfate  (90 Base) MCG/ACT inhaler, Inhale 2 puffs Every 4 (Four) Hours As Needed for Wheezing., Disp: 8.5 g, Rfl: 1  •  amLODIPine (NORVASC) 5 MG tablet, Take 1 tablet by mouth Daily., Disp: 90 tablet, Rfl: 3  •  aspirin 81 MG EC tablet, Take 1 tablet by mouth Daily., Disp: 90 tablet, Rfl: 3  •  brompheniramine-pseudoephedrine-DM 30-2-10 MG/5ML syrup, TAKE 5 ML BY MOUTH EVERY 4 HOURS AS NEEDED FOR COUGH AND CONGESTION, Disp: , Rfl:   •  buPROPion XL (WELLBUTRIN XL) 150 MG 24 hr tablet, Take 1 tablet by mouth Daily., Disp: 30 tablet, Rfl: 5  •  candesartan (ATACAND) 32 MG tablet, Take 1 tablet by mouth Daily., Disp: 90 tablet, Rfl: 3  •  clobetasol (TEMOVATE) 0.05 % external solution, Apply  topically to the appropriate area as directed 2 (Two) Times a Day., Disp: 50 mL, Rfl: 1  •  doxycycline (VIBRAMYCIN) 100 MG capsule, Take 1 capsule by mouth 2 (Two) Times a Day With Meals., Disp: , Rfl:   •  Evolocumab (REPATHA) solution auto-injector SureClick injection, Inject 1 mL under the skin into the appropriate area as directed Every 14 (Fourteen) Days., Disp: 6 mL, Rfl: 3  •  famotidine (PEPCID) 40 MG tablet, Take 1 tablet by mouth Daily., Disp: 90 tablet, Rfl: 3  •  FLUoxetine (PROzac) 20 MG capsule, Take 1 capsule by mouth Daily., Disp: 90 capsule, Rfl: 3  •  loratadine (Claritin) 10 MG tablet, Take 1 tablet by mouth Daily., Disp: 90 tablet, Rfl: 1  •  LORazepam (ATIVAN) 0.5 MG tablet, TAKE 1 TABLET BY MOUTH EVERY 8 HOURS AS NEEDED FOR ANXIETY, Disp: 30 tablet, Rfl: 2  •  metoprolol succinate XL (TOPROL-XL) 100 MG 24 hr tablet, Take 1 tablet by mouth Every Night., Disp: 90 tablet, Rfl: 3  •  promethazine-dextromethorphan (PROMETHAZINE-DM) 6.25-15 MG/5ML syrup, Take 5 mL by mouth 4 (Four) Times a  Day As Needed for Cough., Disp: 120 mL, Rfl: 0  •  rosuvastatin (CRESTOR) 20 MG tablet, Take 1 tablet by mouth Daily., Disp: 90 tablet, Rfl: 3  •  Spiriva Respimat 2.5 MCG/ACT aerosol solution inhaler, INHALE 2 PUFFS BY MOUTH DAILY, Disp: 4 g, Rfl: 0  •  Symbicort 160-4.5 MCG/ACT inhaler, Inhale 2 puffs 2 (Two) Times a Day., Disp: 10.2 g, Rfl: 1  •  triamcinolone (KENALOG) 0.1 % cream, Apply 1 Application topically to the appropriate area as directed 2 (Two) Times a Day., Disp: 30 g, Rfl: 0  •  nystatin (MYCOSTATIN) 100,000 unit/mL suspension, Swish and swallow 5 mL 4 (Four) Times a Day for 7 days., Disp: 60 mL, Rfl: 0    Allergies: Latex, Sulfa antibiotics, Sulfamethoxazole, and Trimethoprim    Physical Exam  Vitals and nursing note reviewed.   Constitutional:       General: She is not in acute distress.     Appearance: Normal appearance. She is not ill-appearing, toxic-appearing or diaphoretic.   HENT:      Head: Normocephalic and atraumatic.      Right Ear: Tympanic membrane, ear canal and external ear normal.      Left Ear: Tympanic membrane, ear canal and external ear normal.      Nose: Congestion present.      Mouth/Throat:      Mouth: Mucous membranes are moist.      Comments: Discolored post nasal drainage  Eyes:      Pupils: Pupils are equal, round, and reactive to light.   Cardiovascular:      Rate and Rhythm: Normal rate and regular rhythm.      Heart sounds: Normal heart sounds.   Pulmonary:      Effort: Pulmonary effort is normal. No respiratory distress.      Breath sounds: No stridor. Wheezing and rales present. No rhonchi.   Chest:      Chest wall: No tenderness.   Neurological:      Mental Status: She is alert. Mental status is at baseline.   Psychiatric:         Mood and Affect: Mood normal.         Behavior: Behavior normal.      Physical Exam      Result Review :          Results               Assessment and Plan    Diagnoses and all orders for this visit:    1. COPD with exacerbation  (Primary)  - Oxygen saturation is currently at 98%.  - Reports worsening symptoms despite previous treatments with antibiotics and steroids.  - Advised to start the prescribed antibiotics and prednisone regimen immediately, taking 3 tablets by mouth twice daily.  - Refill for albuterol inhaler provided. Advised to contact pulmonologist for further recommendations. If condition deteriorates, seek immediate medical attention at the Emergency Room.  2. Centrilobular emphysema  -     albuterol sulfate  (90 Base) MCG/ACT inhaler; Inhale 2 puffs Every 4 (Four) Hours As Needed for Wheezing.  Dispense: 8.5 g; Refill: 1    3. Thrush, oral  -     nystatin (MYCOSTATIN) 100,000 unit/mL suspension; Swish and swallow 5 mL 4 (Four) Times a Day for 7 days.  Dispense: 60 mL; Refill: 0  - Likely has thrush due to extensive use of antibiotics and prednisone.  - Mouthwash prescribed to manage this condition.    4. Sinusitis  - Appears to have a sinus infection contributing to symptoms.  - Prescribed antibiotics should help alleviate this condition.    Assessment & Plan        Follow Up   No follow-ups on file.  Patient was given instructions and counseling regarding her condition or for health maintenance advice. Please see specific information pulled into the AVS if appropriate.     Patient or patient representative verbalized consent for the use of Ambient Listening during the visit with  NAN Carrillo for chart documentation. 6/30/2025  12:57 EDT    NAN Carrillo  06/30/2025

## 2025-07-08 ENCOUNTER — OFFICE VISIT (OUTPATIENT)
Dept: FAMILY MEDICINE CLINIC | Facility: CLINIC | Age: 65
End: 2025-07-08
Payer: MEDICARE

## 2025-07-08 VITALS
SYSTOLIC BLOOD PRESSURE: 126 MMHG | HEART RATE: 74 BPM | WEIGHT: 161 LBS | HEIGHT: 57 IN | OXYGEN SATURATION: 96 % | DIASTOLIC BLOOD PRESSURE: 68 MMHG | BODY MASS INDEX: 34.73 KG/M2

## 2025-07-08 DIAGNOSIS — K59.00 CONSTIPATION, UNSPECIFIED CONSTIPATION TYPE: ICD-10-CM

## 2025-07-08 DIAGNOSIS — B00.1 FEVER BLISTER: ICD-10-CM

## 2025-07-08 DIAGNOSIS — R10.12 INTERMITTENT LEFT UPPER QUADRANT ABDOMINAL PAIN: Primary | ICD-10-CM

## 2025-07-08 PROCEDURE — 99213 OFFICE O/P EST LOW 20 MIN: CPT | Performed by: PHYSICIAN ASSISTANT

## 2025-07-08 PROCEDURE — 1125F AMNT PAIN NOTED PAIN PRSNT: CPT | Performed by: PHYSICIAN ASSISTANT

## 2025-07-08 PROCEDURE — 3078F DIAST BP <80 MM HG: CPT | Performed by: PHYSICIAN ASSISTANT

## 2025-07-08 PROCEDURE — 3074F SYST BP LT 130 MM HG: CPT | Performed by: PHYSICIAN ASSISTANT

## 2025-07-08 RX ORDER — POLYETHYLENE GLYCOL 3350 17 G/17G
17 POWDER, FOR SOLUTION ORAL DAILY
Qty: 238 G | Refills: 1 | Status: SHIPPED | OUTPATIENT
Start: 2025-07-08

## 2025-07-08 NOTE — PROGRESS NOTES
".Chief Complaint  Back Pain, Abdominal Pain, and Oral Pain    Subjective          History of Present Illness  Skye Barlow is here today with her  History of Present Illness  The patient presents for evaluation of abdominal pain, constipation, and a canker sore.    She began experiencing abdominal pain around Friday or Saturday, which was so severe that it hindered her ability to walk and take deep breaths. The pain subsided on Sunday, but she continued to feel discomfort in her stomach. This morning, she woke up without any pain, but it later returned in her ribs. She has been having irregular bowel movements for a few days, but they have normalized in the past two days. She has not had a bowel movement today. She reports no pain during urination or defecation. She still has her gallbladder. She has been using Colace for constipation but is hesitant to use stronger medications. She used to take MiraLAX. Her diet includes oatmeal, prunes, walnuts, and a lot of fresh fruits and vegetables.    She has a painful spot in her mouth that has been bleeding intermittently for about a month. She was previously prescribed mouthwash for thrush. The last instance of bleeding was on Sunday, and the condition has remained the same since then. She suspects that the bleeding occurs when she is asleep due to pressure on the spot. She wears dentures, which she removes at night, and has two remaining teeth. She has not consulted a dentist recently. Occasionally, she experiences a burning sensation in her tongue. She has been using nystatin syrup.    She has COPD, but it has not worsened. She also reports intermittent nausea and severe pain around her eyes.    SOCIAL HISTORY  The patient is a former smoker.    Objective   Vital Signs:   /68   Pulse 74   Ht 144.8 cm (57\")   Wt 73 kg (161 lb)   SpO2 96%   BMI 34.84 kg/m²     Body mass index is 34.84 kg/m².         Review of Systems      Current Outpatient Medications:   • "  albuterol (PROVENTIL) (2.5 MG/3ML) 0.083% nebulizer solution, Take 2.5 mg by nebulization Every 4 (Four) Hours As Needed for Wheezing., Disp: 120 mL, Rfl: 12  •  albuterol (PROVENTIL) 4 MG tablet, Take 2 tablets by mouth 2 (Two) Times a Day., Disp: , Rfl:   •  albuterol sulfate  (90 Base) MCG/ACT inhaler, Inhale 2 puffs Every 4 (Four) Hours As Needed for Wheezing., Disp: 8.5 g, Rfl: 1  •  amLODIPine (NORVASC) 5 MG tablet, Take 1 tablet by mouth Daily., Disp: 90 tablet, Rfl: 3  •  aspirin 81 MG EC tablet, Take 1 tablet by mouth Daily., Disp: 90 tablet, Rfl: 3  •  buPROPion XL (WELLBUTRIN XL) 150 MG 24 hr tablet, Take 1 tablet by mouth Daily., Disp: 30 tablet, Rfl: 5  •  candesartan (ATACAND) 32 MG tablet, Take 1 tablet by mouth Daily., Disp: 90 tablet, Rfl: 3  •  clobetasol (TEMOVATE) 0.05 % external solution, Apply  topically to the appropriate area as directed 2 (Two) Times a Day., Disp: 50 mL, Rfl: 1  •  Evolocumab (REPATHA) solution auto-injector SureClick injection, Inject 1 mL under the skin into the appropriate area as directed Every 14 (Fourteen) Days., Disp: 6 mL, Rfl: 3  •  famotidine (PEPCID) 40 MG tablet, Take 1 tablet by mouth Daily., Disp: 90 tablet, Rfl: 3  •  FLUoxetine (PROzac) 20 MG capsule, Take 1 capsule by mouth Daily., Disp: 90 capsule, Rfl: 3  •  loratadine (Claritin) 10 MG tablet, Take 1 tablet by mouth Daily., Disp: 90 tablet, Rfl: 1  •  metoprolol succinate XL (TOPROL-XL) 100 MG 24 hr tablet, Take 1 tablet by mouth Every Night., Disp: 90 tablet, Rfl: 3  •  rosuvastatin (CRESTOR) 20 MG tablet, Take 1 tablet by mouth Daily., Disp: 90 tablet, Rfl: 3  •  Spiriva Respimat 2.5 MCG/ACT aerosol solution inhaler, INHALE 2 PUFFS BY MOUTH DAILY, Disp: 4 g, Rfl: 0  •  Symbicort 160-4.5 MCG/ACT inhaler, Inhale 2 puffs 2 (Two) Times a Day., Disp: 10.2 g, Rfl: 1  •  triamcinolone (KENALOG) 0.1 % cream, Apply 1 Application topically to the appropriate area as directed 2 (Two) Times a Day., Disp: 30  g, Rfl: 0  •  LORazepam (ATIVAN) 0.5 MG tablet, TAKE 1 TABLET BY MOUTH EVERY 8 HOURS AS NEEDED FOR ANXIETY, Disp: 30 tablet, Rfl: 2  •  polyethylene glycol (MIRALAX) 17 GM/SCOOP powder, Take 17 g by mouth Daily., Disp: 238 g, Rfl: 1    Allergies: Latex, Sulfa antibiotics, Sulfamethoxazole, and Trimethoprim    Physical Exam  Vitals and nursing note reviewed.   Constitutional:       General: She is not in acute distress.     Appearance: Normal appearance. She is normal weight. She is not ill-appearing, toxic-appearing or diaphoretic.   HENT:      Head: Normocephalic and atraumatic.      Right Ear: Tympanic membrane, ear canal and external ear normal.      Left Ear: Tympanic membrane, ear canal and external ear normal.      Nose: Nose normal.      Mouth/Throat:      Mouth: Mucous membranes are moist.   Eyes:      Pupils: Pupils are equal, round, and reactive to light.   Cardiovascular:      Rate and Rhythm: Normal rate and regular rhythm.      Heart sounds: Normal heart sounds.   Pulmonary:      Effort: Pulmonary effort is normal.      Breath sounds: Normal breath sounds.   Neurological:      General: No focal deficit present.      Mental Status: She is alert.   Psychiatric:         Mood and Affect: Mood normal.         Behavior: Behavior normal.      Physical Exam  Mouth/Throat: Canker sore noted in the mouth.  Gastrointestinal: Tenderness in the abdomen upon palpation.    Result Review :          Results               Assessment and Plan    Diagnoses and all orders for this visit:    1. Intermittent left upper quadrant abdominal pain (Primary)  -     XR Abdomen KUB (In Office)  - The abdominal pain is likely due to constipation, as evidenced by recent irregular bowel movements.  - Physical examination reveals tenderness in the abdominal area, particularly underneath.  - An abdominal x-ray will be ordered to further investigate the cause of her discomfort.  - A prescription for MiraLAX will be provided to help manage  constipation; if the x-ray results do not indicate constipation, an ultrasound of the gallbladder may be considered.  2. Fever blister   Presents with a canker sore in the mouth that has been present for about a month and has bled once.  - Physical examination confirms the presence of a canker sore.  - Advised to continue using nystatin syrup as previously prescribed.  - Further treatment options will be considered if the sore does not improve.  3. Constipation, unspecified constipation type  -     polyethylene glycol (MIRALAX) 17 GM/SCOOP powder; Take 17 g by mouth Daily.  Dispense: 238 g; Refill: 1  - Reports recent irregular bowel movements, which have improved over the past few days.  - Physical examination findings suggest constipation as a potential cause of abdominal pain.  - Advised to use MiraLAX daily to help maintain regular bowel movements    Assessment & Plan        Follow Up   No follow-ups on file.  Patient was given instructions and counseling regarding her condition or for health maintenance advice. Please see specific information pulled into the AVS if appropriate.     Patient or patient representative verbalized consent for the use of Ambient Listening during the visit with  NAN Carrillo for chart documentation. 7/24/2025  16:56 EDT    NAN Carrillo  07/08/2025

## 2025-07-14 ENCOUNTER — OFFICE VISIT (OUTPATIENT)
Dept: FAMILY MEDICINE CLINIC | Facility: CLINIC | Age: 65
End: 2025-07-14
Payer: MEDICARE

## 2025-07-14 VITALS
SYSTOLIC BLOOD PRESSURE: 118 MMHG | OXYGEN SATURATION: 96 % | WEIGHT: 166 LBS | HEART RATE: 85 BPM | BODY MASS INDEX: 35.81 KG/M2 | HEIGHT: 57 IN | DIASTOLIC BLOOD PRESSURE: 68 MMHG

## 2025-07-14 DIAGNOSIS — R25.2 CRAMP AND SPASM: ICD-10-CM

## 2025-07-14 DIAGNOSIS — R53.83 OTHER FATIGUE: ICD-10-CM

## 2025-07-14 DIAGNOSIS — R10.11 RUQ ABDOMINAL PAIN: Primary | ICD-10-CM

## 2025-07-14 DIAGNOSIS — R10.11 RUQ PAIN: ICD-10-CM

## 2025-07-14 DIAGNOSIS — B17.9 ACUTE VIRAL HEPATITIS, UNSPECIFIED VIRAL HEPATITIS TYPE: ICD-10-CM

## 2025-07-14 PROCEDURE — 1160F RVW MEDS BY RX/DR IN RCRD: CPT | Performed by: FAMILY MEDICINE

## 2025-07-14 PROCEDURE — 1159F MED LIST DOCD IN RCRD: CPT | Performed by: FAMILY MEDICINE

## 2025-07-14 PROCEDURE — 99214 OFFICE O/P EST MOD 30 MIN: CPT | Performed by: FAMILY MEDICINE

## 2025-07-14 PROCEDURE — 3074F SYST BP LT 130 MM HG: CPT | Performed by: FAMILY MEDICINE

## 2025-07-14 PROCEDURE — 1125F AMNT PAIN NOTED PAIN PRSNT: CPT | Performed by: FAMILY MEDICINE

## 2025-07-14 PROCEDURE — 3078F DIAST BP <80 MM HG: CPT | Performed by: FAMILY MEDICINE

## 2025-07-14 NOTE — PROGRESS NOTES
Follow Up Office Visit      Date of Visit:  2025   Patient Name: Skye Barlow  : 1960   MRN: [id number]     Chief Complaint:    Chief Complaint   Patient presents with    Med Refill     Pain Follow up         History of Present Illness: Skye Barlow is a 65 y.o. female who is here today for follow up.    History of Present Illness  The patient presents for abdominal pain and COPD.    She has been experiencing abdominal discomfort since 2025, which she describes as unusual. The pain originates in the gallbladder area and radiates to her back, possibly reaching her shoulder blades. It intensifies after meals but does not disturb her sleep. The pain is less severe upon waking but increases with activity. She reports no constipation or diarrhea. An x-ray was performed, but it did not reveal any issues with her gallbladder. She mentions that the pain in her back subsided for a week but resumed yesterday.    She has a history of COPD flare-ups and was previously on oxygen therapy. She was prescribed Augmentin, which improved her condition within a day and a half. She is unsure if she was given steroids. She underwent a walk test during which she fell. She is concerned about the possibility of needing long-term steroid use and the potential worsening of her breathing. She plans to take her nebulizer on a road trip to [place] with her daughter. She recently replaced the batteries in her pulse oximeter, which showed an oxygen level of 42, causing her to panic.    Diet: She consumes prunes, raisins, dark chocolate, blueberries, chicken, tuna salad with eggs, pickles, and Miracle Whip. She had lasagna and chocolate cake yesterday, along with a tossed salad and watermelon.  Tobacco: She reports no current tobacco use and mentions her  has quit smoking.  Sleep: She reports that the pain does not disturb her sleep and is less severe upon waking.      Subjective      Review of Systems:    Review of Systems    Past Medical History:   Past Medical History:   Diagnosis Date    Anxiety and depression 2024    Anxiety state     Benign essential hypertension     Chronic obstructive lung disease     COPD (chronic obstructive pulmonary disease)     Depressive disorder     Gastroesophageal reflux disease     Migraines     Psoriasis     TMJ (dislocation of temporomandibular joint)     Tobacco abuse        Past Surgical History:   Past Surgical History:   Procedure Laterality Date     SECTION      X3       Family History:   Family History   Problem Relation Age of Onset    Hypertension Mother     Hypertension Father     Dementia Father     Colon cancer Maternal Grandmother     Coronary artery disease Paternal Grandmother     Diabetes Paternal Grandmother     Ovarian cancer Paternal Aunt     Hypertension Other     Diabetes Other     Breast cancer Neg Hx        Social History:   Social History     Socioeconomic History    Marital status:    Tobacco Use    Smoking status: Former     Current packs/day: 0.00     Average packs/day: 0.3 packs/day for 43.2 years (10.8 ttl pk-yrs)     Types: Cigarettes     Start date: 4/15/1981     Quit date: 2024     Years since quittin.0     Passive exposure: Past    Smokeless tobacco: Never    Tobacco comments:     Down to 2-3 cigarettes a day    Vaping Use    Vaping status: Never Used   Substance and Sexual Activity    Alcohol use: Never    Drug use: Never    Sexual activity: Defer       Medications:     Current Outpatient Medications:     albuterol (PROVENTIL) (2.5 MG/3ML) 0.083% nebulizer solution, Take 2.5 mg by nebulization Every 4 (Four) Hours As Needed for Wheezing., Disp: 120 mL, Rfl: 12    albuterol (PROVENTIL) 4 MG tablet, Take 2 tablets by mouth 2 (Two) Times a Day., Disp: , Rfl:     albuterol sulfate  (90 Base) MCG/ACT inhaler, Inhale 2 puffs Every 4 (Four) Hours As Needed for Wheezing., Disp: 8.5 g, Rfl: 1    amLODIPine (NORVASC) 5 MG  tablet, Take 1 tablet by mouth Daily., Disp: 90 tablet, Rfl: 3    aspirin 81 MG EC tablet, Take 1 tablet by mouth Daily., Disp: 90 tablet, Rfl: 3    buPROPion XL (WELLBUTRIN XL) 150 MG 24 hr tablet, Take 1 tablet by mouth Daily., Disp: 30 tablet, Rfl: 5    candesartan (ATACAND) 32 MG tablet, Take 1 tablet by mouth Daily., Disp: 90 tablet, Rfl: 3    clobetasol (TEMOVATE) 0.05 % external solution, Apply  topically to the appropriate area as directed 2 (Two) Times a Day., Disp: 50 mL, Rfl: 1    Evolocumab (REPATHA) solution auto-injector SureClick injection, Inject 1 mL under the skin into the appropriate area as directed Every 14 (Fourteen) Days., Disp: 6 mL, Rfl: 3    famotidine (PEPCID) 40 MG tablet, Take 1 tablet by mouth Daily., Disp: 90 tablet, Rfl: 3    FLUoxetine (PROzac) 20 MG capsule, Take 1 capsule by mouth Daily., Disp: 90 capsule, Rfl: 3    loratadine (Claritin) 10 MG tablet, Take 1 tablet by mouth Daily., Disp: 90 tablet, Rfl: 1    LORazepam (ATIVAN) 0.5 MG tablet, TAKE 1 TABLET BY MOUTH EVERY 8 HOURS AS NEEDED FOR ANXIETY, Disp: 30 tablet, Rfl: 2    metoprolol succinate XL (TOPROL-XL) 100 MG 24 hr tablet, Take 1 tablet by mouth Every Night., Disp: 90 tablet, Rfl: 3    polyethylene glycol (MIRALAX) 17 GM/SCOOP powder, Take 17 g by mouth Daily., Disp: 238 g, Rfl: 1    rosuvastatin (CRESTOR) 20 MG tablet, Take 1 tablet by mouth Daily., Disp: 90 tablet, Rfl: 3    Spiriva Respimat 2.5 MCG/ACT aerosol solution inhaler, INHALE 2 PUFFS BY MOUTH DAILY, Disp: 4 g, Rfl: 0    Symbicort 160-4.5 MCG/ACT inhaler, Inhale 2 puffs 2 (Two) Times a Day., Disp: 10.2 g, Rfl: 1    triamcinolone (KENALOG) 0.1 % cream, Apply 1 Application topically to the appropriate area as directed 2 (Two) Times a Day., Disp: 30 g, Rfl: 0    Allergies:   Allergies   Allergen Reactions    Latex Rash    Sulfa Antibiotics Hives    Sulfamethoxazole Rash    Trimethoprim Rash       Objective     Physical Exam:  Vital Signs:   Vitals:    07/14/25  "1420   BP: 118/68   Pulse: 85   SpO2: 96%   Weight: 75.3 kg (166 lb)   Height: 144.8 cm (57\")   PainSc: 5    PainLoc: Generalized     Body mass index is 35.92 kg/m².     Physical Exam  Physical Exam  Gastrointestinal: Tenderness noted in the right upper quadrant, particularly around the 10th rib.    Results  Imaging   - X-ray: No issues with the gallbladder.predominance of stool    Procedures      Assessment / Plan      Assessment/Plan:   Diagnoses and all orders for this visit:    1. RUQ abdominal pain (Primary)  -     Comprehensive Metabolic Panel  -     CBC & Differential  -     Gamma GT; Future    2. Other fatigue  -     TSH    3. Cramp and spasm  -     Magnesium    4. Acute viral hepatitis, unspecified viral hepatitis type  -     Gamma GT; Future    5. RUQ pain       Assessment & Plan  1. Abdominal pain.  - Reports abdominal pain starting around 07/04/2025, worsening after eating, and sometimes radiating to the back. Pain is not severe at night but increases with activity.  - X-ray did not show any issues with the gallbladder. Tenderness noted around the 10th rib area.  - Blood work will be ordered to further investigate the cause of the pain.  - Continue monitoring symptoms and consider further imaging if pain persists.    2. Chronic obstructive pulmonary disease (COPD).  - History of COPD with recent flare-up requiring oxygen therapy and antibiotics (Augmentin).  - Significant improvement noted after quitting smoking.  - Advised to carry nebulizer during upcoming road trip to [place]. Use oxygen as needed and contact pulmonologist if flare-up occurs.  - Continue current management and monitor for any exacerbations.    Follow Up:   No follow-ups on file.    Patient or patient representative verbalized consent for the use of Ambient Listening during the visit with  Rod Stone MD for chart documentation. 7/14/2025  15:19 EDT     @[facility]   "

## 2025-07-15 LAB
ALBUMIN SERPL-MCNC: 4.2 G/DL (ref 3.9–4.9)
ALP SERPL-CCNC: 99 IU/L (ref 44–121)
ALT SERPL-CCNC: 28 IU/L (ref 0–32)
AST SERPL-CCNC: 27 IU/L (ref 0–40)
BASOPHILS # BLD AUTO: 0.1 X10E3/UL (ref 0–0.2)
BASOPHILS NFR BLD AUTO: 1 %
BILIRUB SERPL-MCNC: 0.2 MG/DL (ref 0–1.2)
BUN SERPL-MCNC: 9 MG/DL (ref 8–27)
BUN/CREAT SERPL: 10 (ref 12–28)
CALCIUM SERPL-MCNC: 9.5 MG/DL (ref 8.7–10.3)
CHLORIDE SERPL-SCNC: 101 MMOL/L (ref 96–106)
CO2 SERPL-SCNC: 22 MMOL/L (ref 20–29)
CREAT SERPL-MCNC: 0.89 MG/DL (ref 0.57–1)
EGFRCR SERPLBLD CKD-EPI 2021: 72 ML/MIN/1.73
EOSINOPHIL # BLD AUTO: 0.1 X10E3/UL (ref 0–0.4)
EOSINOPHIL NFR BLD AUTO: 2 %
ERYTHROCYTE [DISTWIDTH] IN BLOOD BY AUTOMATED COUNT: 12.4 % (ref 11.7–15.4)
GLOBULIN SER CALC-MCNC: 1.7 G/DL (ref 1.5–4.5)
GLUCOSE SERPL-MCNC: 82 MG/DL (ref 70–99)
HCT VFR BLD AUTO: 40.4 % (ref 34–46.6)
HGB BLD-MCNC: 12.7 G/DL (ref 11.1–15.9)
IMM GRANULOCYTES # BLD AUTO: 0 X10E3/UL (ref 0–0.1)
IMM GRANULOCYTES NFR BLD AUTO: 0 %
LYMPHOCYTES # BLD AUTO: 1.6 X10E3/UL (ref 0.7–3.1)
LYMPHOCYTES NFR BLD AUTO: 18 %
MAGNESIUM SERPL-MCNC: 2 MG/DL (ref 1.6–2.3)
MCH RBC QN AUTO: 30.8 PG (ref 26.6–33)
MCHC RBC AUTO-ENTMCNC: 31.4 G/DL (ref 31.5–35.7)
MCV RBC AUTO: 98 FL (ref 79–97)
MONOCYTES # BLD AUTO: 1 X10E3/UL (ref 0.1–0.9)
MONOCYTES NFR BLD AUTO: 11 %
NEUTROPHILS # BLD AUTO: 6.1 X10E3/UL (ref 1.4–7)
NEUTROPHILS NFR BLD AUTO: 68 %
PLATELET # BLD AUTO: 361 X10E3/UL (ref 150–450)
POTASSIUM SERPL-SCNC: 4.7 MMOL/L (ref 3.5–5.2)
PROT SERPL-MCNC: 5.9 G/DL (ref 6–8.5)
RBC # BLD AUTO: 4.13 X10E6/UL (ref 3.77–5.28)
SODIUM SERPL-SCNC: 140 MMOL/L (ref 134–144)
TSH SERPL DL<=0.005 MIU/L-ACNC: 2.07 UIU/ML (ref 0.45–4.5)
WBC # BLD AUTO: 8.9 X10E3/UL (ref 3.4–10.8)

## 2025-07-19 DIAGNOSIS — F06.4 ANXIETY DISORDER DUE TO GENERAL MEDICAL CONDITION: ICD-10-CM

## 2025-07-19 RX ORDER — LORAZEPAM 0.5 MG/1
TABLET ORAL
Qty: 30 TABLET | Refills: 2 | Status: SHIPPED | OUTPATIENT
Start: 2025-07-19

## 2025-07-28 ENCOUNTER — OFFICE VISIT (OUTPATIENT)
Dept: FAMILY MEDICINE CLINIC | Facility: CLINIC | Age: 65
End: 2025-07-28
Payer: MEDICARE

## 2025-07-28 VITALS
SYSTOLIC BLOOD PRESSURE: 116 MMHG | HEART RATE: 74 BPM | WEIGHT: 162 LBS | HEIGHT: 58 IN | BODY MASS INDEX: 34 KG/M2 | DIASTOLIC BLOOD PRESSURE: 66 MMHG | OXYGEN SATURATION: 97 %

## 2025-07-28 DIAGNOSIS — J44.1 COPD WITH ACUTE EXACERBATION: Primary | ICD-10-CM

## 2025-07-28 DIAGNOSIS — B37.0 THRUSH, ORAL: ICD-10-CM

## 2025-07-28 PROCEDURE — 3078F DIAST BP <80 MM HG: CPT | Performed by: PHYSICIAN ASSISTANT

## 2025-07-28 PROCEDURE — G2211 COMPLEX E/M VISIT ADD ON: HCPCS | Performed by: PHYSICIAN ASSISTANT

## 2025-07-28 PROCEDURE — 3074F SYST BP LT 130 MM HG: CPT | Performed by: PHYSICIAN ASSISTANT

## 2025-07-28 PROCEDURE — 99214 OFFICE O/P EST MOD 30 MIN: CPT | Performed by: PHYSICIAN ASSISTANT

## 2025-07-28 PROCEDURE — 1125F AMNT PAIN NOTED PAIN PRSNT: CPT | Performed by: PHYSICIAN ASSISTANT

## 2025-07-28 RX ORDER — TIOTROPIUM BROMIDE INHALATION SPRAY 3.12 UG/1
2 SPRAY, METERED RESPIRATORY (INHALATION) DAILY
Qty: 4 G | Refills: 0 | Status: SHIPPED | OUTPATIENT
Start: 2025-07-28

## 2025-07-28 RX ORDER — NYSTATIN 100000 [USP'U]/ML
500000 SUSPENSION ORAL 4 TIMES DAILY
Qty: 60 ML | Refills: 0 | Status: SHIPPED | OUTPATIENT
Start: 2025-07-28

## 2025-07-28 RX ORDER — PREDNISONE 20 MG/1
TABLET ORAL
Qty: 18 TABLET | Refills: 0 | Status: SHIPPED | OUTPATIENT
Start: 2025-07-28

## 2025-07-28 RX ORDER — AZITHROMYCIN 250 MG/1
TABLET, FILM COATED ORAL
Qty: 6 TABLET | Refills: 0 | Status: SHIPPED | OUTPATIENT
Start: 2025-07-28

## 2025-07-28 NOTE — PROGRESS NOTES
.Chief Complaint  COPD and Back Pain (Back pain on and off for past couple of months)    Subjective          History of Present Illness  Skye Barlow is here today with her  History of Present Illness  The patient presents for evaluation of shortness of breath and tongue lesion.    She began experiencing discomfort with her breathing on 07/25/2025, which she initially dismissed. Her symptoms include increased shortness of breath, coughing, and wheezing. Despite these symptoms, she managed to sleep well last night, although with some difficulty breathing when she got up due to frequent bathroom visits. She has been producing some sputum when coughing. She used her nebulizer approximately an hour before the visit, but it did not provide significant relief. She has been using Spiriva and Symbicort as part of her treatment regimen. She was recently prescribed prednisone, which she finds distressing due to potential side effects such as diabetes and congestive heart failure. She does not consistently use her oxygen supply. She has a scheduled appointment with her pulmonologist in August or September 2025, but may need to reschedule for an earlier date.    She also reports a persistent lesion on her tongue that has not shown signs of improvement. She occasionally bites her tongue during sleep, causing a burning sensation that sometimes wakes her up at night. She has noticed blood on her tongue, particularly after biting it. She recalls an incident in either 03/2025 or 05/2025 when she woke up to find her tongue bleeding heavily and red, with a blood blister forming underneath by the next morning. This has been a recurring issue since then. She has not consulted a dentist about this issue as her previous dentist is no longer practicing. She was prescribed a mouthwash, which initially seemed to help but did not completely resolve the issue. She has been using nystatin syrup for the burning sensation.        Objective  "  Vital Signs:   /66   Pulse 74   Ht 146.1 cm (57.5\")   Wt 73.5 kg (162 lb)   SpO2 97%   BMI 34.45 kg/m²     Body mass index is 34.45 kg/m².         Review of Systems      Current Outpatient Medications:     albuterol (PROVENTIL) (2.5 MG/3ML) 0.083% nebulizer solution, Take 2.5 mg by nebulization Every 4 (Four) Hours As Needed for Wheezing., Disp: 120 mL, Rfl: 12    albuterol (PROVENTIL) 4 MG tablet, Take 2 tablets by mouth 2 (Two) Times a Day., Disp: , Rfl:     albuterol sulfate  (90 Base) MCG/ACT inhaler, Inhale 2 puffs Every 4 (Four) Hours As Needed for Wheezing., Disp: 8.5 g, Rfl: 1    amLODIPine (NORVASC) 5 MG tablet, Take 1 tablet by mouth Daily., Disp: 90 tablet, Rfl: 3    aspirin 81 MG EC tablet, Take 1 tablet by mouth Daily., Disp: 90 tablet, Rfl: 3    buPROPion XL (WELLBUTRIN XL) 150 MG 24 hr tablet, Take 1 tablet by mouth Daily., Disp: 30 tablet, Rfl: 5    candesartan (ATACAND) 32 MG tablet, Take 1 tablet by mouth Daily., Disp: 90 tablet, Rfl: 3    clobetasol (TEMOVATE) 0.05 % external solution, Apply  topically to the appropriate area as directed 2 (Two) Times a Day., Disp: 50 mL, Rfl: 1    Evolocumab (REPATHA) solution auto-injector SureClick injection, Inject 1 mL under the skin into the appropriate area as directed Every 14 (Fourteen) Days., Disp: 6 mL, Rfl: 3    famotidine (PEPCID) 40 MG tablet, Take 1 tablet by mouth Daily., Disp: 90 tablet, Rfl: 3    FLUoxetine (PROzac) 20 MG capsule, Take 1 capsule by mouth Daily., Disp: 90 capsule, Rfl: 3    loratadine (Claritin) 10 MG tablet, Take 1 tablet by mouth Daily., Disp: 90 tablet, Rfl: 1    LORazepam (ATIVAN) 0.5 MG tablet, TAKE 1 TABLET BY MOUTH EVERY 8 HOURS AS NEEDED FOR ANXIETY, Disp: 30 tablet, Rfl: 2    metoprolol succinate XL (TOPROL-XL) 100 MG 24 hr tablet, Take 1 tablet by mouth Every Night., Disp: 90 tablet, Rfl: 3    polyethylene glycol (MIRALAX) 17 GM/SCOOP powder, Take 17 g by mouth Daily., Disp: 238 g, Rfl: 1    " rosuvastatin (CRESTOR) 20 MG tablet, Take 1 tablet by mouth Daily., Disp: 90 tablet, Rfl: 3    Spiriva Respimat 2.5 MCG/ACT aerosol solution inhaler, INHALE 2 PUFFS BY MOUTH DAILY, Disp: 4 g, Rfl: 0    Symbicort 160-4.5 MCG/ACT inhaler, Inhale 2 puffs 2 (Two) Times a Day., Disp: 10.2 g, Rfl: 1    triamcinolone (KENALOG) 0.1 % cream, Apply 1 Application topically to the appropriate area as directed 2 (Two) Times a Day., Disp: 30 g, Rfl: 0    azithromycin (ZITHROMAX) 250 MG tablet, Take 2 tablets the first day, then 1 tablet daily for 4 days., Disp: 6 tablet, Rfl: 0    nystatin (MYCOSTATIN) 100,000 unit/mL suspension, Swish and swallow 5 mL 4 (Four) Times a Day., Disp: 60 mL, Rfl: 0    predniSONE (DELTASONE) 20 MG tablet, 3 tab po qd x 3 days then 2 tab po qd  x 3 days then 1 tab po qd x 3 days, Disp: 18 tablet, Rfl: 0    Allergies: Latex, Sulfa antibiotics, Sulfamethoxazole, and Trimethoprim    Physical Exam  Vitals and nursing note reviewed.   Constitutional:       General: She is not in acute distress.     Appearance: Normal appearance. She is not ill-appearing, toxic-appearing or diaphoretic.   HENT:      Head: Normocephalic and atraumatic.      Mouth/Throat:     Cardiovascular:      Rate and Rhythm: Normal rate and regular rhythm.      Heart sounds: Normal heart sounds.   Pulmonary:      Effort: Pulmonary effort is normal. No respiratory distress.      Breath sounds: No wheezing.      Comments: Decreased breath sounds throughout with no wheezing or rhonchi or consolidation noted  Neurological:      Mental Status: She is alert. Mental status is at baseline.   Psychiatric:         Mood and Affect: Mood normal.         Behavior: Behavior normal.        Physical Exam      Result Review :          Results               Assessment and Plan    Diagnoses and all orders for this visit:    1. COPD with acute exacerbation (Primary)  -     predniSONE (DELTASONE) 20 MG tablet; 3 tab po qd x 3 days then 2 tab po qd  x 3 days  then 1 tab po qd x 3 days  Dispense: 18 tablet; Refill: 0  -     azithromycin (ZITHROMAX) 250 MG tablet; Take 2 tablets the first day, then 1 tablet daily for 4 days.  Dispense: 6 tablet; Refill: 0  - Reports feeling rough since 07/25/2025, with increased shortness of breath, cough, and wheezing.  - Using nebulizer, Spiriva, and Symbicort but continues to experience symptoms. No evidence of pneumonia or wheezing upon examination, but reduced air movement compared to the previous visit.  - Discussion about prescribing a Z-Colin to help with lung inflammation. Advised to continue using inhalers and to wear oxygen continuously while on prednisone to help lungs rest.  - Prescriptions for Z-Colin and prednisone sent to pharmacy. Advised to keep appointment with pulmonologist Dr. Stone on 08/01/2025.    2. Thrush, oral  -     nystatin (MYCOSTATIN) 100,000 unit/mL suspension; Swish and swallow 5 mL 4 (Four) Times a Day.  Dispense: 60 mL; Refill: 0  - Reports persistent lesion on tongue causing significant pain and bleeding, especially at night.  - Examination reveals a spot on the tongue with signs of thrush. Previous use of nystatin syrup noted.  - Discussion about continuing nystatin syrup and consulting with Dr. tSone regarding the issue during the upcoming appointment.  - Refill of nystatin syrup sent to pharmacy. Advised to consult with Dr. Stone on 08/01/2025.  - Her dentist retired after COVID and she has not reestablished with new dentist. Patient was advised that she needs to be seen by dentistry  Assessment & Plan    Follow-up: 08/01/2025 with Dr. Stone.      Follow Up   No follow-ups on file.  Patient was given instructions and counseling regarding her condition or for health maintenance advice. Please see specific information pulled into the AVS if appropriate.     Patient or patient representative verbalized consent for the use of Ambient Listening during the visit with  NAN Carrillo for chart  documentation. 7/28/2025  13:44 EDT    NAN Carrillo  07/28/2025

## 2025-08-01 ENCOUNTER — OFFICE VISIT (OUTPATIENT)
Dept: FAMILY MEDICINE CLINIC | Facility: CLINIC | Age: 65
End: 2025-08-01
Payer: MEDICARE

## 2025-08-01 VITALS
DIASTOLIC BLOOD PRESSURE: 70 MMHG | HEART RATE: 86 BPM | HEIGHT: 58 IN | OXYGEN SATURATION: 96 % | BODY MASS INDEX: 34.22 KG/M2 | SYSTOLIC BLOOD PRESSURE: 118 MMHG | WEIGHT: 163 LBS

## 2025-08-01 DIAGNOSIS — I25.10 CORONARY ARTERY DISEASE INVOLVING NATIVE CORONARY ARTERY OF NATIVE HEART WITHOUT ANGINA PECTORIS: ICD-10-CM

## 2025-08-01 DIAGNOSIS — R35.0 INCREASED FREQUENCY OF URINATION: ICD-10-CM

## 2025-08-01 DIAGNOSIS — I10 ESSENTIAL HYPERTENSION: ICD-10-CM

## 2025-08-01 DIAGNOSIS — E78.5 HYPERLIPIDEMIA LDL GOAL <100: ICD-10-CM

## 2025-08-01 DIAGNOSIS — J44.1 CHRONIC OBSTRUCTIVE PULMONARY DISEASE WITH ACUTE EXACERBATION: Primary | ICD-10-CM

## 2025-08-01 DIAGNOSIS — J40 BRONCHITIS: ICD-10-CM

## 2025-08-01 PROCEDURE — 1125F AMNT PAIN NOTED PAIN PRSNT: CPT | Performed by: FAMILY MEDICINE

## 2025-08-01 PROCEDURE — 1160F RVW MEDS BY RX/DR IN RCRD: CPT | Performed by: FAMILY MEDICINE

## 2025-08-01 PROCEDURE — 99214 OFFICE O/P EST MOD 30 MIN: CPT | Performed by: FAMILY MEDICINE

## 2025-08-01 PROCEDURE — 3074F SYST BP LT 130 MM HG: CPT | Performed by: FAMILY MEDICINE

## 2025-08-01 PROCEDURE — 1159F MED LIST DOCD IN RCRD: CPT | Performed by: FAMILY MEDICINE

## 2025-08-01 PROCEDURE — 3078F DIAST BP <80 MM HG: CPT | Performed by: FAMILY MEDICINE

## 2025-08-01 NOTE — PROGRESS NOTES
Follow Up Office Visit      Date of Visit:  2025   Patient Name: Skye Barlow  : 1960   MRN: [id number]     Chief Complaint:    Chief Complaint   Patient presents with    Thrush    Back Pain       History of Present Illness: Skye Barlow is a 65 y.o. female who is here today for follow up.  Of lbp and thrush. 60% improvement of the lbp. Feel like her tongue is burning and wake .        Subjective      Review of Systems:   Review of Systems    Past Medical History:   Past Medical History:   Diagnosis Date    Anxiety and depression 2024    Anxiety state     Benign essential hypertension     Chronic obstructive lung disease     COPD (chronic obstructive pulmonary disease)     Depressive disorder     Gastroesophageal reflux disease     Migraines     Psoriasis     TMJ (dislocation of temporomandibular joint)     Tobacco abuse        Past Surgical History:   Past Surgical History:   Procedure Laterality Date     SECTION      X3       Family History:   Family History   Problem Relation Age of Onset    Hypertension Mother     Hypertension Father     Dementia Father     Colon cancer Maternal Grandmother     Coronary artery disease Paternal Grandmother     Diabetes Paternal Grandmother     Ovarian cancer Paternal Aunt     Hypertension Other     Diabetes Other     Breast cancer Neg Hx        Social History:   Social History     Socioeconomic History    Marital status:    Tobacco Use    Smoking status: Former     Current packs/day: 0.00     Average packs/day: 0.3 packs/day for 43.2 years (10.8 ttl pk-yrs)     Types: Cigarettes     Start date: 4/15/1981     Quit date: 2024     Years since quittin.0     Passive exposure: Past    Smokeless tobacco: Never    Tobacco comments:     Down to 2-3 cigarettes a day    Vaping Use    Vaping status: Never Used   Substance and Sexual Activity    Alcohol use: Never    Drug use: Never    Sexual activity: Defer       Medications:      Current Outpatient Medications:     albuterol (PROVENTIL) (2.5 MG/3ML) 0.083% nebulizer solution, Take 2.5 mg by nebulization Every 4 (Four) Hours As Needed for Wheezing., Disp: 120 mL, Rfl: 12    albuterol (PROVENTIL) 4 MG tablet, Take 2 tablets by mouth 2 (Two) Times a Day., Disp: , Rfl:     albuterol sulfate  (90 Base) MCG/ACT inhaler, Inhale 2 puffs Every 4 (Four) Hours As Needed for Wheezing., Disp: 8.5 g, Rfl: 1    amLODIPine (NORVASC) 5 MG tablet, Take 1 tablet by mouth Daily., Disp: 90 tablet, Rfl: 3    aspirin 81 MG EC tablet, Take 1 tablet by mouth Daily., Disp: 90 tablet, Rfl: 3    azithromycin (ZITHROMAX) 250 MG tablet, Take 2 tablets the first day, then 1 tablet daily for 4 days., Disp: 6 tablet, Rfl: 0    buPROPion XL (WELLBUTRIN XL) 150 MG 24 hr tablet, Take 1 tablet by mouth Daily., Disp: 30 tablet, Rfl: 5    candesartan (ATACAND) 32 MG tablet, Take 1 tablet by mouth Daily., Disp: 90 tablet, Rfl: 3    clobetasol (TEMOVATE) 0.05 % external solution, Apply  topically to the appropriate area as directed 2 (Two) Times a Day., Disp: 50 mL, Rfl: 1    Evolocumab (REPATHA) solution auto-injector SureClick injection, Inject 1 mL under the skin into the appropriate area as directed Every 14 (Fourteen) Days., Disp: 6 mL, Rfl: 3    famotidine (PEPCID) 40 MG tablet, Take 1 tablet by mouth Daily., Disp: 90 tablet, Rfl: 3    FLUoxetine (PROzac) 20 MG capsule, Take 1 capsule by mouth Daily., Disp: 90 capsule, Rfl: 3    loratadine (Claritin) 10 MG tablet, Take 1 tablet by mouth Daily., Disp: 90 tablet, Rfl: 1    LORazepam (ATIVAN) 0.5 MG tablet, TAKE 1 TABLET BY MOUTH EVERY 8 HOURS AS NEEDED FOR ANXIETY, Disp: 30 tablet, Rfl: 2    metoprolol succinate XL (TOPROL-XL) 100 MG 24 hr tablet, Take 1 tablet by mouth Every Night., Disp: 90 tablet, Rfl: 3    nystatin (MYCOSTATIN) 100,000 unit/mL suspension, Swish and swallow 5 mL 4 (Four) Times a Day., Disp: 60 mL, Rfl: 0    polyethylene glycol (MIRALAX) 17  "GM/SCOOP powder, Take 17 g by mouth Daily., Disp: 238 g, Rfl: 1    predniSONE (DELTASONE) 20 MG tablet, 3 tab po qd x 3 days then 2 tab po qd  x 3 days then 1 tab po qd x 3 days, Disp: 18 tablet, Rfl: 0    rosuvastatin (CRESTOR) 20 MG tablet, Take 1 tablet by mouth Daily., Disp: 90 tablet, Rfl: 3    Spiriva Respimat 2.5 MCG/ACT aerosol solution inhaler, INHALE 2 PUFFS BY MOUTH DAILY, Disp: 4 g, Rfl: 0    Symbicort 160-4.5 MCG/ACT inhaler, Inhale 2 puffs 2 (Two) Times a Day., Disp: 10.2 g, Rfl: 1    triamcinolone (KENALOG) 0.1 % cream, Apply 1 Application topically to the appropriate area as directed 2 (Two) Times a Day., Disp: 30 g, Rfl: 0    Allergies:   Allergies   Allergen Reactions    Latex Rash    Sulfa Antibiotics Hives    Sulfamethoxazole Rash    Trimethoprim Rash       Objective     Physical Exam:  Vital Signs:   Vitals:    08/01/25 1305   BP: 118/70   Pulse: 86   SpO2: 96%   Weight: 73.9 kg (163 lb)   Height: 146.1 cm (57.5\")     Body mass index is 34.66 kg/m².     Physical Exam  Vitals and nursing note reviewed.   Constitutional:       Appearance: Normal appearance. She is normal weight.   HENT:      Head: Normocephalic.      Right Ear: External ear normal.      Left Ear: External ear normal.      Nose: Nose normal.   Eyes:      Pupils: Pupils are equal, round, and reactive to light.   Neck:      Vascular: No carotid bruit.   Cardiovascular:      Rate and Rhythm: Normal rate and regular rhythm.      Pulses: Normal pulses.      Heart sounds: Normal heart sounds.   Pulmonary:      Effort: Pulmonary effort is normal.      Breath sounds: Normal breath sounds.   Musculoskeletal:      Cervical back: Normal range of motion and neck supple.   Skin:     General: Skin is warm and dry.   Neurological:      Mental Status: She is alert.   Psychiatric:         Mood and Affect: Mood normal.     Line on the tongue, left that looks straight as an arrow, 1 inch  Decreased breath sounds 3+  Procedures      Assessment / Plan  "     Assessment/Plan:    1. acute exacerbation of COPD improved  2.essential hypertension  3.increased frequency of urination  4.bronchitis  5.coronary artery disease involving native coronary artery  6.hyperlipidemia  7. Straight line leukoplakia see the dentist    Follow Up:   Return in about 3 months (around 11/1/2025).    @[facility] [place]

## 2025-08-08 ENCOUNTER — OFFICE VISIT (OUTPATIENT)
Dept: FAMILY MEDICINE CLINIC | Facility: CLINIC | Age: 65
End: 2025-08-08
Payer: MEDICARE

## 2025-08-08 VITALS
HEART RATE: 114 BPM | HEIGHT: 58 IN | OXYGEN SATURATION: 97 % | WEIGHT: 169 LBS | DIASTOLIC BLOOD PRESSURE: 80 MMHG | SYSTOLIC BLOOD PRESSURE: 140 MMHG | BODY MASS INDEX: 35.48 KG/M2

## 2025-08-08 DIAGNOSIS — J44.1 CHRONIC OBSTRUCTIVE PULMONARY DISEASE WITH ACUTE EXACERBATION: ICD-10-CM

## 2025-08-08 DIAGNOSIS — R60.9 EDEMA, UNSPECIFIED TYPE: ICD-10-CM

## 2025-08-08 DIAGNOSIS — I10 ESSENTIAL HYPERTENSION: ICD-10-CM

## 2025-08-08 DIAGNOSIS — R10.32 LEFT LOWER QUADRANT ABDOMINAL PAIN: Primary | ICD-10-CM

## 2025-08-08 DIAGNOSIS — I25.10 CORONARY ARTERY DISEASE INVOLVING NATIVE CORONARY ARTERY OF NATIVE HEART WITHOUT ANGINA PECTORIS: ICD-10-CM

## 2025-08-08 PROCEDURE — 1125F AMNT PAIN NOTED PAIN PRSNT: CPT | Performed by: FAMILY MEDICINE

## 2025-08-08 PROCEDURE — 3077F SYST BP >= 140 MM HG: CPT | Performed by: FAMILY MEDICINE

## 2025-08-08 PROCEDURE — 1159F MED LIST DOCD IN RCRD: CPT | Performed by: FAMILY MEDICINE

## 2025-08-08 PROCEDURE — 99214 OFFICE O/P EST MOD 30 MIN: CPT | Performed by: FAMILY MEDICINE

## 2025-08-08 PROCEDURE — 3079F DIAST BP 80-89 MM HG: CPT | Performed by: FAMILY MEDICINE

## 2025-08-08 PROCEDURE — 1160F RVW MEDS BY RX/DR IN RCRD: CPT | Performed by: FAMILY MEDICINE

## 2025-08-08 RX ORDER — POTASSIUM CHLORIDE 750 MG/1
10 TABLET, EXTENDED RELEASE ORAL DAILY
Qty: 30 TABLET | Refills: 0 | Status: SHIPPED | OUTPATIENT
Start: 2025-08-08

## 2025-08-08 RX ORDER — BUMETANIDE 1 MG/1
1 TABLET ORAL DAILY
Qty: 30 TABLET | Refills: 0 | Status: SHIPPED | OUTPATIENT
Start: 2025-08-08

## 2025-08-12 ENCOUNTER — OUTSIDE FACILITY SERVICE (OUTPATIENT)
Dept: CARDIOLOGY | Facility: CLINIC | Age: 65
End: 2025-08-12
Payer: MEDICARE

## 2025-08-12 PROCEDURE — 93306 TTE W/DOPPLER COMPLETE: CPT | Performed by: INTERNAL MEDICINE

## 2025-08-21 ENCOUNTER — TELEPHONE (OUTPATIENT)
Dept: FAMILY MEDICINE CLINIC | Facility: CLINIC | Age: 65
End: 2025-08-21
Payer: MEDICARE

## 2025-08-25 RX ORDER — TIOTROPIUM BROMIDE INHALATION SPRAY 3.12 UG/1
2 SPRAY, METERED RESPIRATORY (INHALATION) DAILY
Qty: 4 G | Refills: 0 | Status: SHIPPED | OUTPATIENT
Start: 2025-08-25

## 2025-08-27 ENCOUNTER — READMISSION MANAGEMENT (OUTPATIENT)
Dept: CALL CENTER | Facility: HOSPITAL | Age: 65
End: 2025-08-27
Payer: MEDICARE

## 2025-08-28 ENCOUNTER — TRANSITIONAL CARE MANAGEMENT TELEPHONE ENCOUNTER (OUTPATIENT)
Dept: CALL CENTER | Facility: HOSPITAL | Age: 65
End: 2025-08-28
Payer: MEDICARE

## 2025-08-29 ENCOUNTER — TRANSITIONAL CARE MANAGEMENT TELEPHONE ENCOUNTER (OUTPATIENT)
Dept: CALL CENTER | Facility: HOSPITAL | Age: 65
End: 2025-08-29
Payer: MEDICARE

## 2025-08-29 ENCOUNTER — OFFICE VISIT (OUTPATIENT)
Dept: CARDIOLOGY | Facility: CLINIC | Age: 65
End: 2025-08-29
Payer: MEDICARE

## 2025-08-29 VITALS
DIASTOLIC BLOOD PRESSURE: 76 MMHG | OXYGEN SATURATION: 94 % | WEIGHT: 158 LBS | HEIGHT: 58 IN | HEART RATE: 84 BPM | BODY MASS INDEX: 33.17 KG/M2 | SYSTOLIC BLOOD PRESSURE: 138 MMHG | RESPIRATION RATE: 18 BRPM

## 2025-08-29 DIAGNOSIS — I10 ESSENTIAL HYPERTENSION: ICD-10-CM

## 2025-08-29 DIAGNOSIS — I50.32 CHRONIC DIASTOLIC CONGESTIVE HEART FAILURE: ICD-10-CM

## 2025-08-29 DIAGNOSIS — E78.5 HYPERLIPIDEMIA LDL GOAL <100: ICD-10-CM

## 2025-08-29 DIAGNOSIS — J43.2 CENTRILOBULAR EMPHYSEMA: ICD-10-CM

## 2025-08-29 DIAGNOSIS — I25.10 CORONARY ARTERY DISEASE INVOLVING NATIVE CORONARY ARTERY OF NATIVE HEART WITHOUT ANGINA PECTORIS: Primary | ICD-10-CM

## 2025-08-29 PROBLEM — J43.9 COPD WITH EMPHYSEMA: Status: ACTIVE | Noted: 2021-09-10

## 2025-08-29 RX ORDER — GUAIFENESIN 600 MG/1
1200 TABLET, EXTENDED RELEASE ORAL 2 TIMES DAILY
COMMUNITY

## 2025-08-29 RX ORDER — IPRATROPIUM BROMIDE AND ALBUTEROL SULFATE 2.5; .5 MG/3ML; MG/3ML
3 SOLUTION RESPIRATORY (INHALATION) EVERY 4 HOURS PRN
COMMUNITY

## 2025-08-29 RX ORDER — METOPROLOL SUCCINATE 50 MG/1
50 TABLET, EXTENDED RELEASE ORAL NIGHTLY
Qty: 90 TABLET | Refills: 3 | Status: SHIPPED | OUTPATIENT
Start: 2025-08-29

## 2025-08-29 RX ORDER — DAPAGLIFLOZIN 10 MG/1
TABLET, FILM COATED ORAL
COMMUNITY

## 2025-08-29 RX ORDER — FUROSEMIDE 40 MG/1
40 TABLET ORAL 2 TIMES DAILY
COMMUNITY

## 2025-08-29 RX ORDER — LIDOCAINE 50 MG/G
1 PATCH TOPICAL EVERY 24 HOURS
COMMUNITY